# Patient Record
Sex: FEMALE | Race: WHITE | NOT HISPANIC OR LATINO | Employment: OTHER | URBAN - METROPOLITAN AREA
[De-identification: names, ages, dates, MRNs, and addresses within clinical notes are randomized per-mention and may not be internally consistent; named-entity substitution may affect disease eponyms.]

---

## 2020-03-11 ENCOUNTER — HISTORICAL (OUTPATIENT)
Dept: RADIOLOGY | Facility: HOSPITAL | Age: 70
End: 2020-03-11

## 2020-04-29 ENCOUNTER — HISTORICAL (OUTPATIENT)
Dept: ADMINISTRATIVE | Facility: HOSPITAL | Age: 70
End: 2020-04-29

## 2020-05-27 ENCOUNTER — HISTORICAL (OUTPATIENT)
Dept: ADMINISTRATIVE | Facility: HOSPITAL | Age: 70
End: 2020-05-27

## 2020-08-18 ENCOUNTER — HISTORICAL (OUTPATIENT)
Dept: SURGERY | Facility: HOSPITAL | Age: 70
End: 2020-08-18

## 2020-08-18 LAB
BUN SERPL-MCNC: 11.1 MG/DL (ref 9.8–20.1)
CALCIUM SERPL-MCNC: 9 MG/DL (ref 8.4–10.2)
CHLORIDE SERPL-SCNC: 99 MMOL/L (ref 98–107)
CO2 SERPL-SCNC: 29 MMOL/L (ref 23–31)
CREAT SERPL-MCNC: 0.7 MG/DL (ref 0.55–1.02)
GLUCOSE SERPL-MCNC: 123 MG/DL (ref 82–115)
HCT VFR BLD AUTO: 42.6 % (ref 37–47)
HGB BLD-MCNC: 13.2 GM/DL (ref 12–16)
INR PPP: 1 (ref 0–1.3)
PLATELET # BLD AUTO: 379 X10(3)/MCL (ref 130–400)
POTASSIUM SERPL-SCNC: 4.2 MMOL/L (ref 3.5–5.1)
PROTHROMBIN TIME: 12.9 SECOND(S) (ref 11.1–13.7)
SODIUM SERPL-SCNC: 137 MMOL/L (ref 136–145)

## 2020-10-11 ENCOUNTER — HISTORICAL (OUTPATIENT)
Dept: ADMINISTRATIVE | Facility: HOSPITAL | Age: 70
End: 2020-10-11

## 2020-10-11 LAB
ABS NEUT (OLG): 5.35 X10(3)/MCL (ref 2.1–9.2)
ALBUMIN SERPL-MCNC: 2.5 GM/DL (ref 3.4–5)
ALBUMIN/GLOB SERPL: 0.8 RATIO (ref 1.1–2)
ALP SERPL-CCNC: 66 UNIT/L (ref 40–150)
ALT SERPL-CCNC: 15 UNIT/L (ref 0–55)
AST SERPL-CCNC: 18 UNIT/L (ref 5–34)
BASOPHILS # BLD AUTO: 0.1 X10(3)/MCL (ref 0–0.2)
BASOPHILS NFR BLD AUTO: 1 %
BILIRUB SERPL-MCNC: <0.5 MG/DL
BILIRUBIN DIRECT+TOT PNL SERPL-MCNC: 0.1 MG/DL (ref 0–0.5)
BILIRUBIN DIRECT+TOT PNL SERPL-MCNC: <0.4 MG/DL (ref 0–0.8)
BUN SERPL-MCNC: 42.4 MG/DL (ref 9.8–20.1)
CALCIUM SERPL-MCNC: 7.2 MG/DL (ref 8.4–10.2)
CHLORIDE SERPL-SCNC: 99 MMOL/L (ref 98–107)
CO2 SERPL-SCNC: 21 MMOL/L (ref 23–31)
CREAT SERPL-MCNC: 4.97 MG/DL (ref 0.55–1.02)
EOSINOPHIL # BLD AUTO: 0.1 X10(3)/MCL (ref 0–0.9)
EOSINOPHIL NFR BLD AUTO: 2 %
ERYTHROCYTE [DISTWIDTH] IN BLOOD BY AUTOMATED COUNT: 18 % (ref 11.5–17)
GLOBULIN SER-MCNC: 3.2 GM/DL (ref 2.4–3.5)
GLUCOSE SERPL-MCNC: 75 MG/DL (ref 82–115)
HCT VFR BLD AUTO: 29.7 % (ref 37–47)
HGB BLD-MCNC: 8.9 GM/DL (ref 12–16)
LYMPHOCYTES # BLD AUTO: 1.5 X10(3)/MCL (ref 0.6–4.6)
LYMPHOCYTES NFR BLD AUTO: 19 %
MCH RBC QN AUTO: 26.3 PG (ref 27–31)
MCHC RBC AUTO-ENTMCNC: 30 GM/DL (ref 33–36)
MCV RBC AUTO: 87.6 FL (ref 80–94)
MONOCYTES # BLD AUTO: 0.7 X10(3)/MCL (ref 0.1–1.3)
MONOCYTES NFR BLD AUTO: 9 %
NEUTROPHILS # BLD AUTO: 5.35 X10(3)/MCL (ref 2.1–9.2)
NEUTROPHILS NFR BLD AUTO: 68 %
PLATELET # BLD AUTO: 431 X10(3)/MCL (ref 130–400)
PMV BLD AUTO: 9.9 FL (ref 9.4–12.4)
POTASSIUM SERPL-SCNC: 6.6 MMOL/L (ref 3.5–5.1)
PROT SERPL-MCNC: 5.7 GM/DL (ref 5.8–7.6)
RBC # BLD AUTO: 3.39 X10(6)/MCL (ref 4.2–5.4)
SODIUM SERPL-SCNC: 133 MMOL/L (ref 136–145)
WBC # SPEC AUTO: 7.9 X10(3)/MCL (ref 4.5–11.5)

## 2020-11-03 LAB
ABS NEUT (OLG): 9.6 X10(3)/MCL (ref 2.1–9.2)
ALBUMIN SERPL-MCNC: 2 GM/DL (ref 3.4–4.8)
ALBUMIN/GLOB SERPL: 0.5 RATIO (ref 1.1–2)
ALP SERPL-CCNC: 76 UNIT/L (ref 40–150)
ALT SERPL-CCNC: 10 UNIT/L (ref 0–55)
AST SERPL-CCNC: 14 UNIT/L (ref 5–34)
BASOPHILS # BLD AUTO: 0.05 X10(3)/MCL (ref 0–0.2)
BASOPHILS NFR BLD AUTO: 0.4 % (ref 0–1)
BILIRUB SERPL-MCNC: 0.2 MG/DL (ref 0.2–1.2)
BILIRUBIN DIRECT+TOT PNL SERPL-MCNC: 0.1 MG/DL (ref 0–0.5)
BILIRUBIN DIRECT+TOT PNL SERPL-MCNC: 0.1 MG/DL (ref 0–0.8)
BUN SERPL-MCNC: 17.4 MG/DL (ref 9.8–20.1)
CALCIUM SERPL-MCNC: 9.6 MG/DL (ref 8.4–10.2)
CHLORIDE SERPL-SCNC: 92 MMOL/L (ref 98–107)
CO2 SERPL-SCNC: 37 MMOL/L (ref 23–31)
CREAT SERPL-MCNC: 1.34 MG/DL (ref 0.57–1.11)
CRP SERPL HS-MCNC: 49.41 MG/L (ref 0–5)
EOSINOPHIL # BLD AUTO: 0.27 X10(3)/MCL (ref 0–0.9)
EOSINOPHIL NFR BLD AUTO: 2 % (ref 0–6.4)
ERYTHROCYTE [DISTWIDTH] IN BLOOD BY AUTOMATED COUNT: 16.2 % (ref 11.5–17)
ERYTHROCYTE [SEDIMENTATION RATE] IN BLOOD: 98 MM/HR (ref 0–30)
FERRITIN SERPL-MCNC: 107.5 NG/ML (ref 4.63–204)
GLOBULIN SER-MCNC: 4.1 GM/DL (ref 2.4–3.5)
GLUCOSE SERPL-MCNC: 105 MG/DL (ref 82–115)
HCT VFR BLD AUTO: 30.8 % (ref 37–47)
HGB BLD-MCNC: 9.5 GM/DL (ref 12–16)
IMM GRANULOCYTES # BLD AUTO: 0.2 10*3/UL (ref 0–0.02)
IMM GRANULOCYTES NFR BLD AUTO: 1.5 % (ref 0–0.43)
IRON SATN MFR SERPL: 11 % (ref 20–50)
IRON SERPL-MCNC: 23 UG/DL (ref 50–170)
LYMPHOCYTES # BLD AUTO: 2.22 X10(3)/MCL (ref 0.6–4.6)
LYMPHOCYTES NFR BLD AUTO: 16.7 % (ref 16–44)
MAGNESIUM SERPL-MCNC: 1.73 MG/DL (ref 1.6–2.6)
MCH RBC QN AUTO: 27.3 PG (ref 27–31)
MCHC RBC AUTO-ENTMCNC: 30.8 GM/DL (ref 33–36)
MCV RBC AUTO: 88.5 FL (ref 80–94)
MONOCYTES # BLD AUTO: 0.95 X10(3)/MCL (ref 0.1–1.3)
MONOCYTES NFR BLD AUTO: 7.1 % (ref 4–12.1)
NEUTROPHILS # BLD AUTO: 9.6 X10(3)/MCL (ref 2.1–9.2)
NEUTROPHILS NFR BLD AUTO: 72.3 % (ref 43–73)
NRBC BLD AUTO-RTO: 0 % (ref 0–0.2)
PHOSPHATE SERPL-MCNC: 5.9 MG/DL (ref 2.3–4.7)
PLATELET # BLD AUTO: 530 X10(3)/MCL (ref 130–400)
PMV BLD AUTO: 8.9 FL (ref 7.4–10.4)
POTASSIUM SERPL-SCNC: 3.8 MMOL/L (ref 3.5–5.1)
PROT SERPL-MCNC: 6.1 GM/DL (ref 5.8–7.6)
RBC # BLD AUTO: 3.48 X10(6)/MCL (ref 4.2–5.4)
SODIUM SERPL-SCNC: 139 MMOL/L (ref 136–145)
TIBC SERPL-MCNC: 192 UG/DL (ref 70–310)
TIBC SERPL-MCNC: 215 UG/DL (ref 250–450)
TRANSFERRIN SERPL-MCNC: 185 MG/DL (ref 173–360)
WBC # SPEC AUTO: 13.3 X10(3)/MCL (ref 4.5–11.5)

## 2020-11-04 LAB
BUN SERPL-MCNC: 16.3 MG/DL (ref 9.8–20.1)
CALCIUM SERPL-MCNC: 9.4 MG/DL (ref 8.4–10.2)
CHLORIDE SERPL-SCNC: 93 MMOL/L (ref 98–107)
CHOLEST SERPL-MCNC: 140 MG/DL
CHOLEST/HDLC SERPL: 4 {RATIO} (ref 0–5)
CO2 SERPL-SCNC: 38 MMOL/L (ref 23–31)
CREAT SERPL-MCNC: 1.02 MG/DL (ref 0.57–1.11)
CREAT/UREA NIT SERPL: 16
GLUCOSE SERPL-MCNC: 102 MG/DL (ref 82–115)
HDLC SERPL-MCNC: 38 MG/DL (ref 40–60)
LDLC SERPL CALC-MCNC: 83 MG/DL (ref 50–140)
MAGNESIUM SERPL-MCNC: 1.36 MG/DL (ref 1.6–2.6)
PHOSPHATE SERPL-MCNC: 5.4 MG/DL (ref 2.3–4.7)
POTASSIUM SERPL-SCNC: 3.2 MMOL/L (ref 3.5–5.1)
PREALB SERPL-MCNC: 12.3 MG/DL (ref 14–37)
SODIUM SERPL-SCNC: 141 MMOL/L (ref 136–145)
TRIGL SERPL-MCNC: 97 MG/DL (ref 0–150)
VLDLC SERPL CALC-MCNC: 19 MG/DL

## 2020-11-05 LAB
BUN SERPL-MCNC: 14.8 MG/DL (ref 9.8–20.1)
CALCIUM SERPL-MCNC: 9.6 MG/DL (ref 8.4–10.2)
CHLORIDE SERPL-SCNC: 93 MMOL/L (ref 98–107)
CO2 SERPL-SCNC: 33 MMOL/L (ref 23–31)
CREAT SERPL-MCNC: 0.89 MG/DL (ref 0.57–1.11)
CREAT/UREA NIT SERPL: 17
GLUCOSE SERPL-MCNC: 112 MG/DL (ref 82–115)
MAGNESIUM SERPL-MCNC: 1.7 MG/DL (ref 1.6–2.6)
POTASSIUM SERPL-SCNC: 3.8 MMOL/L (ref 3.5–5.1)
SODIUM SERPL-SCNC: 137 MMOL/L (ref 136–145)

## 2020-11-07 ENCOUNTER — HISTORICAL (OUTPATIENT)
Dept: ADMINISTRATIVE | Facility: HOSPITAL | Age: 70
End: 2020-11-07

## 2020-11-07 LAB
ABS NEUT (OLG): 13.01 X10(3)/MCL (ref 2.1–9.2)
ALBUMIN SERPL-MCNC: 2.2 GM/DL (ref 3.4–4.8)
ALBUMIN/GLOB SERPL: 0.5 RATIO (ref 1.1–2)
ALP SERPL-CCNC: 65 UNIT/L (ref 40–150)
ALT SERPL-CCNC: 11 UNIT/L (ref 0–55)
APPEARANCE, UA: CLEAR
AST SERPL-CCNC: 15 UNIT/L (ref 5–34)
BACTERIA SPEC CULT: ABNORMAL /HPF
BASOPHILS # BLD AUTO: 0.04 X10(3)/MCL (ref 0–0.2)
BASOPHILS NFR BLD AUTO: 0.2 % (ref 0–1)
BILIRUB SERPL-MCNC: 0.2 MG/DL (ref 0.2–1.2)
BILIRUB UR QL STRIP: NEGATIVE
BILIRUBIN DIRECT+TOT PNL SERPL-MCNC: 0.1 MG/DL (ref 0–0.5)
BILIRUBIN DIRECT+TOT PNL SERPL-MCNC: 0.1 MG/DL (ref 0–0.8)
BUN SERPL-MCNC: 17.4 MG/DL (ref 9.8–20.1)
CALCIUM SERPL-MCNC: 9.3 MG/DL (ref 8.4–10.2)
CHLORIDE SERPL-SCNC: 92 MMOL/L (ref 98–107)
CO2 SERPL-SCNC: 31 MMOL/L (ref 23–31)
COLOR UR: ABNORMAL
CREAT SERPL-MCNC: 0.88 MG/DL (ref 0.57–1.11)
EOSINOPHIL # BLD AUTO: 0.23 X10(3)/MCL (ref 0–0.9)
EOSINOPHIL NFR BLD AUTO: 1.4 % (ref 0–6.4)
ERYTHROCYTE [DISTWIDTH] IN BLOOD BY AUTOMATED COUNT: 15.9 % (ref 11.5–17)
GLOBULIN SER-MCNC: 4.2 GM/DL (ref 2.4–3.5)
GLUCOSE (UA): NEGATIVE
GLUCOSE SERPL-MCNC: 158 MG/DL (ref 82–115)
HCT VFR BLD AUTO: 30.4 % (ref 37–47)
HGB BLD-MCNC: 9.6 GM/DL (ref 12–16)
HGB UR QL STRIP: ABNORMAL
IMM GRANULOCYTES # BLD AUTO: 0.14 10*3/UL (ref 0–0.02)
IMM GRANULOCYTES NFR BLD AUTO: 0.8 % (ref 0–0.43)
KETONES UR QL STRIP: NEGATIVE
LEUKOCYTE ESTERASE UR QL STRIP: ABNORMAL
LYMPHOCYTES # BLD AUTO: 2.39 X10(3)/MCL (ref 0.6–4.6)
LYMPHOCYTES NFR BLD AUTO: 14.2 % (ref 16–44)
MCH RBC QN AUTO: 27.7 PG (ref 27–31)
MCHC RBC AUTO-ENTMCNC: 31.6 GM/DL (ref 33–36)
MCV RBC AUTO: 87.6 FL (ref 80–94)
MONOCYTES # BLD AUTO: 0.99 X10(3)/MCL (ref 0.1–1.3)
MONOCYTES NFR BLD AUTO: 5.9 % (ref 4–12.1)
NEUTROPHILS # BLD AUTO: 13.01 X10(3)/MCL (ref 2.1–9.2)
NEUTROPHILS NFR BLD AUTO: 77.5 % (ref 43–73)
NITRITE UR QL STRIP: NEGATIVE
NRBC BLD AUTO-RTO: 0 % (ref 0–0.2)
PH UR STRIP: 8 [PH] (ref 5–7)
PLATELET # BLD AUTO: 459 X10(3)/MCL (ref 130–400)
PMV BLD AUTO: 9.3 FL (ref 7.4–10.4)
POTASSIUM SERPL-SCNC: 4 MMOL/L (ref 3.5–5.1)
PROT SERPL-MCNC: 6.4 GM/DL (ref 5.8–7.6)
PROT UR QL STRIP: ABNORMAL
RBC # BLD AUTO: 3.47 X10(6)/MCL (ref 4.2–5.4)
RBC #/AREA URNS HPF: ABNORMAL /HPF
SODIUM SERPL-SCNC: 134 MMOL/L (ref 136–145)
SP GR UR STRIP: 1.02 (ref 1–1.03)
SQUAMOUS EPITHELIAL, UA: ABNORMAL /LPF
UROBILINOGEN UR STRIP-ACNC: NEGATIVE
WBC # SPEC AUTO: 16.8 X10(3)/MCL (ref 4.5–11.5)
WBC #/AREA URNS HPF: ABNORMAL /HPF

## 2020-11-08 LAB
ABS NEUT (OLG): 9.61 X10(3)/MCL (ref 2.1–9.2)
BASOPHILS # BLD AUTO: 0.03 X10(3)/MCL (ref 0–0.2)
BASOPHILS NFR BLD AUTO: 0.2 % (ref 0–1)
BUN SERPL-MCNC: 15.4 MG/DL (ref 9.8–20.1)
CALCIUM SERPL-MCNC: 10 MG/DL (ref 8.4–10.2)
CHLORIDE SERPL-SCNC: 95 MMOL/L (ref 98–107)
CO2 SERPL-SCNC: 33 MMOL/L (ref 23–31)
CREAT SERPL-MCNC: 0.87 MG/DL (ref 0.57–1.11)
CREAT/UREA NIT SERPL: 18
EOSINOPHIL # BLD AUTO: 0.22 X10(3)/MCL (ref 0–0.9)
EOSINOPHIL NFR BLD AUTO: 1.7 % (ref 0–6.4)
ERYTHROCYTE [DISTWIDTH] IN BLOOD BY AUTOMATED COUNT: 15.7 % (ref 11.5–17)
GLUCOSE SERPL-MCNC: 143 MG/DL (ref 82–115)
HCT VFR BLD AUTO: 29.8 % (ref 37–47)
HGB BLD-MCNC: 9.4 GM/DL (ref 12–16)
IMM GRANULOCYTES # BLD AUTO: 0.12 10*3/UL (ref 0–0.02)
IMM GRANULOCYTES NFR BLD AUTO: 0.9 % (ref 0–0.43)
LYMPHOCYTES # BLD AUTO: 2.08 X10(3)/MCL (ref 0.6–4.6)
LYMPHOCYTES NFR BLD AUTO: 16 % (ref 16–44)
MAGNESIUM SERPL-MCNC: 1.33 MG/DL (ref 1.6–2.6)
MCH RBC QN AUTO: 27.6 PG (ref 27–31)
MCHC RBC AUTO-ENTMCNC: 31.5 GM/DL (ref 33–36)
MCV RBC AUTO: 87.4 FL (ref 80–94)
MONOCYTES # BLD AUTO: 0.96 X10(3)/MCL (ref 0.1–1.3)
MONOCYTES NFR BLD AUTO: 7.4 % (ref 4–12.1)
NEUTROPHILS # BLD AUTO: 9.61 X10(3)/MCL (ref 2.1–9.2)
NEUTROPHILS NFR BLD AUTO: 73.8 % (ref 43–73)
NRBC BLD AUTO-RTO: 0 % (ref 0–0.2)
PHOSPHATE SERPL-MCNC: 4.4 MG/DL (ref 2.3–4.7)
PLATELET # BLD AUTO: 414 X10(3)/MCL (ref 130–400)
PMV BLD AUTO: 9.3 FL (ref 7.4–10.4)
POTASSIUM SERPL-SCNC: 4 MMOL/L (ref 3.5–5.1)
RBC # BLD AUTO: 3.41 X10(6)/MCL (ref 4.2–5.4)
SODIUM SERPL-SCNC: 138 MMOL/L (ref 136–145)
WBC # SPEC AUTO: 13 X10(3)/MCL (ref 4.5–11.5)

## 2020-11-09 LAB
ABS NEUT (OLG): 11.7 X10(3)/MCL (ref 2.1–9.2)
ALBUMIN SERPL-MCNC: 2.4 GM/DL (ref 3.4–4.8)
ALBUMIN/GLOB SERPL: 0.5 RATIO (ref 1.1–2)
ALP SERPL-CCNC: 71 UNIT/L (ref 40–150)
ALT SERPL-CCNC: 10 UNIT/L (ref 0–55)
AST SERPL-CCNC: 15 UNIT/L (ref 5–34)
BASOPHILS # BLD AUTO: 0.05 X10(3)/MCL (ref 0–0.2)
BASOPHILS NFR BLD AUTO: 0.3 % (ref 0–1)
BILIRUB SERPL-MCNC: 0.3 MG/DL (ref 0.2–1.2)
BILIRUBIN DIRECT+TOT PNL SERPL-MCNC: 0.1 MG/DL (ref 0–0.5)
BILIRUBIN DIRECT+TOT PNL SERPL-MCNC: 0.2 MG/DL (ref 0–0.8)
BUN SERPL-MCNC: 14.7 MG/DL (ref 9.8–20.1)
CALCIUM SERPL-MCNC: 10.1 MG/DL (ref 8.4–10.2)
CHLORIDE SERPL-SCNC: 97 MMOL/L (ref 98–107)
CO2 SERPL-SCNC: 29 MMOL/L (ref 23–31)
CREAT SERPL-MCNC: 0.81 MG/DL (ref 0.57–1.11)
CRP SERPL HS-MCNC: 86.22 MG/L (ref 0–5)
EOSINOPHIL # BLD AUTO: 0.29 X10(3)/MCL (ref 0–0.9)
EOSINOPHIL NFR BLD AUTO: 1.9 % (ref 0–6.4)
ERYTHROCYTE [DISTWIDTH] IN BLOOD BY AUTOMATED COUNT: 15.7 % (ref 11.5–17)
ERYTHROCYTE [SEDIMENTATION RATE] IN BLOOD: 96 MM/HR (ref 0–30)
GLOBULIN SER-MCNC: 4.5 GM/DL (ref 2.4–3.5)
GLUCOSE SERPL-MCNC: 128 MG/DL (ref 82–115)
HCT VFR BLD AUTO: 33.5 % (ref 37–47)
HGB BLD-MCNC: 10.4 GM/DL (ref 12–16)
IMM GRANULOCYTES # BLD AUTO: 0.11 10*3/UL (ref 0–0.02)
IMM GRANULOCYTES NFR BLD AUTO: 0.7 % (ref 0–0.43)
LYMPHOCYTES # BLD AUTO: 2.19 X10(3)/MCL (ref 0.6–4.6)
LYMPHOCYTES NFR BLD AUTO: 14.3 % (ref 16–44)
MAGNESIUM SERPL-MCNC: 2.15 MG/DL (ref 1.6–2.6)
MCH RBC QN AUTO: 27.2 PG (ref 27–31)
MCHC RBC AUTO-ENTMCNC: 31 GM/DL (ref 33–36)
MCV RBC AUTO: 87.5 FL (ref 80–94)
MONOCYTES # BLD AUTO: 0.95 X10(3)/MCL (ref 0.1–1.3)
MONOCYTES NFR BLD AUTO: 6.2 % (ref 4–12.1)
NEUTROPHILS # BLD AUTO: 11.7 X10(3)/MCL (ref 2.1–9.2)
NEUTROPHILS NFR BLD AUTO: 76.6 % (ref 43–73)
NRBC BLD AUTO-RTO: 0 % (ref 0–0.2)
PHOSPHATE SERPL-MCNC: 4.4 MG/DL (ref 2.3–4.7)
PLATELET # BLD AUTO: 422 X10(3)/MCL (ref 130–400)
PMV BLD AUTO: 9.3 FL (ref 7.4–10.4)
POTASSIUM SERPL-SCNC: 3.9 MMOL/L (ref 3.5–5.1)
PREALB SERPL-MCNC: 15.4 MG/DL (ref 14–37)
PROT SERPL-MCNC: 6.9 GM/DL (ref 5.8–7.6)
RBC # BLD AUTO: 3.83 X10(6)/MCL (ref 4.2–5.4)
SODIUM SERPL-SCNC: 137 MMOL/L (ref 136–145)
WBC # SPEC AUTO: 15.3 X10(3)/MCL (ref 4.5–11.5)

## 2020-11-10 ENCOUNTER — HISTORICAL (OUTPATIENT)
Dept: SURGERY | Facility: HOSPITAL | Age: 70
End: 2020-11-10

## 2020-11-11 LAB
ABS NEUT (OLG): 10.37 X10(3)/MCL (ref 2.1–9.2)
BASOPHILS # BLD AUTO: 0.05 X10(3)/MCL (ref 0–0.2)
BASOPHILS NFR BLD AUTO: 0.4 % (ref 0–1)
BUN SERPL-MCNC: 20.3 MG/DL (ref 9.8–20.1)
CALCIUM SERPL-MCNC: 9.9 MG/DL (ref 8.4–10.2)
CHLORIDE SERPL-SCNC: 94 MMOL/L (ref 98–107)
CO2 SERPL-SCNC: 33 MMOL/L (ref 23–31)
CREAT SERPL-MCNC: 1.02 MG/DL (ref 0.57–1.11)
CREAT/UREA NIT SERPL: 20
EOSINOPHIL # BLD AUTO: 0.26 X10(3)/MCL (ref 0–0.9)
EOSINOPHIL NFR BLD AUTO: 1.9 % (ref 0–6.4)
ERYTHROCYTE [DISTWIDTH] IN BLOOD BY AUTOMATED COUNT: 15.5 % (ref 11.5–17)
GLUCOSE SERPL-MCNC: 121 MG/DL (ref 82–115)
HCT VFR BLD AUTO: 32.3 % (ref 37–47)
HGB BLD-MCNC: 10 GM/DL (ref 12–16)
IMM GRANULOCYTES # BLD AUTO: 0.08 10*3/UL (ref 0–0.02)
IMM GRANULOCYTES NFR BLD AUTO: 0.6 % (ref 0–0.43)
LYMPHOCYTES # BLD AUTO: 1.97 X10(3)/MCL (ref 0.6–4.6)
LYMPHOCYTES NFR BLD AUTO: 14.3 % (ref 16–44)
MCH RBC QN AUTO: 27.5 PG (ref 27–31)
MCHC RBC AUTO-ENTMCNC: 31 GM/DL (ref 33–36)
MCV RBC AUTO: 89 FL (ref 80–94)
MONOCYTES # BLD AUTO: 1.05 X10(3)/MCL (ref 0.1–1.3)
MONOCYTES NFR BLD AUTO: 7.6 % (ref 4–12.1)
NEUTROPHILS # BLD AUTO: 10.37 X10(3)/MCL (ref 2.1–9.2)
NEUTROPHILS NFR BLD AUTO: 75.2 % (ref 43–73)
NRBC BLD AUTO-RTO: 0 % (ref 0–0.2)
PLATELET # BLD AUTO: 411 X10(3)/MCL (ref 130–400)
PMV BLD AUTO: 9.6 FL (ref 7.4–10.4)
POTASSIUM SERPL-SCNC: 4.2 MMOL/L (ref 3.5–5.1)
RBC # BLD AUTO: 3.63 X10(6)/MCL (ref 4.2–5.4)
SODIUM SERPL-SCNC: 137 MMOL/L (ref 136–145)
WBC # SPEC AUTO: 13.8 X10(3)/MCL (ref 4.5–11.5)

## 2020-11-16 LAB
ABS NEUT (OLG): 9.26 X10(3)/MCL (ref 2.1–9.2)
ALBUMIN SERPL-MCNC: 2.8 GM/DL (ref 3.4–4.8)
ALBUMIN/GLOB SERPL: 0.6 RATIO (ref 1.1–2)
ALP SERPL-CCNC: 82 UNIT/L (ref 40–150)
ALT SERPL-CCNC: 16 UNIT/L (ref 0–55)
AST SERPL-CCNC: 16 UNIT/L (ref 5–34)
BASOPHILS # BLD AUTO: 0.06 X10(3)/MCL (ref 0–0.2)
BASOPHILS NFR BLD AUTO: 0.4 % (ref 0–1)
BILIRUB SERPL-MCNC: 0.2 MG/DL (ref 0.2–1.2)
BILIRUBIN DIRECT+TOT PNL SERPL-MCNC: 0.1 MG/DL (ref 0–0.5)
BILIRUBIN DIRECT+TOT PNL SERPL-MCNC: 0.1 MG/DL (ref 0–0.8)
BUN SERPL-MCNC: 24 MG/DL (ref 9.8–20.1)
CALCIUM SERPL-MCNC: 10.9 MG/DL (ref 8.4–10.2)
CHLORIDE SERPL-SCNC: 94 MMOL/L (ref 98–107)
CO2 SERPL-SCNC: 31 MMOL/L (ref 23–31)
CREAT SERPL-MCNC: 0.86 MG/DL (ref 0.57–1.11)
CRP SERPL HS-MCNC: 31.14 MG/L (ref 0–5)
EOSINOPHIL # BLD AUTO: 0.32 X10(3)/MCL (ref 0–0.9)
EOSINOPHIL NFR BLD AUTO: 2.4 % (ref 0–6.4)
ERYTHROCYTE [DISTWIDTH] IN BLOOD BY AUTOMATED COUNT: 15.1 % (ref 11.5–17)
ERYTHROCYTE [SEDIMENTATION RATE] IN BLOOD: 97 MM/HR (ref 0–30)
GLOBULIN SER-MCNC: 4.9 GM/DL (ref 2.4–3.5)
GLUCOSE SERPL-MCNC: 161 MG/DL (ref 82–115)
HCT VFR BLD AUTO: 35.4 % (ref 37–47)
HGB BLD-MCNC: 10.9 GM/DL (ref 12–16)
IMM GRANULOCYTES # BLD AUTO: 0.22 10*3/UL (ref 0–0.02)
IMM GRANULOCYTES NFR BLD AUTO: 1.6 % (ref 0–0.43)
LYMPHOCYTES # BLD AUTO: 2.76 X10(3)/MCL (ref 0.6–4.6)
LYMPHOCYTES NFR BLD AUTO: 20.6 % (ref 16–44)
MAGNESIUM SERPL-MCNC: 2.12 MG/DL (ref 1.6–2.6)
MCH RBC QN AUTO: 26.4 PG (ref 27–31)
MCHC RBC AUTO-ENTMCNC: 30.8 GM/DL (ref 33–36)
MCV RBC AUTO: 85.7 FL (ref 80–94)
MONOCYTES # BLD AUTO: 0.79 X10(3)/MCL (ref 0.1–1.3)
MONOCYTES NFR BLD AUTO: 5.9 % (ref 4–12.1)
NEUTROPHILS # BLD AUTO: 9.26 X10(3)/MCL (ref 2.1–9.2)
NEUTROPHILS NFR BLD AUTO: 69.1 % (ref 43–73)
NRBC BLD AUTO-RTO: 0 % (ref 0–0.2)
PHOSPHATE SERPL-MCNC: 4.2 MG/DL (ref 2.3–4.7)
PLATELET # BLD AUTO: 475 X10(3)/MCL (ref 130–400)
PMV BLD AUTO: 9.6 FL (ref 7.4–10.4)
POTASSIUM SERPL-SCNC: 4.3 MMOL/L (ref 3.5–5.1)
PREALB SERPL-MCNC: 21.2 MG/DL (ref 14–37)
PROT SERPL-MCNC: 7.7 GM/DL (ref 5.8–7.6)
RBC # BLD AUTO: 4.13 X10(6)/MCL (ref 4.2–5.4)
SODIUM SERPL-SCNC: 134 MMOL/L (ref 136–145)
WBC # SPEC AUTO: 13.4 X10(3)/MCL (ref 4.5–11.5)

## 2020-11-18 LAB
ALBUMIN SERPL-MCNC: 2.9 GM/DL (ref 3.4–4.8)
ALBUMIN/GLOB SERPL: 0.6 RATIO (ref 1.1–2)
ALP SERPL-CCNC: 72 UNIT/L (ref 40–150)
ALT SERPL-CCNC: 16 UNIT/L (ref 0–55)
AST SERPL-CCNC: 13 UNIT/L (ref 5–34)
BILIRUB SERPL-MCNC: 0.2 MG/DL (ref 0.2–1.2)
BILIRUBIN DIRECT+TOT PNL SERPL-MCNC: 0.1 MG/DL (ref 0–0.5)
BILIRUBIN DIRECT+TOT PNL SERPL-MCNC: 0.1 MG/DL (ref 0–0.8)
BUN SERPL-MCNC: 26.6 MG/DL (ref 9.8–20.1)
CALCIUM SERPL-MCNC: 10.4 MG/DL (ref 8.4–10.2)
CHLORIDE SERPL-SCNC: 99 MMOL/L (ref 98–107)
CO2 SERPL-SCNC: 25 MMOL/L (ref 23–31)
CREAT SERPL-MCNC: 0.82 MG/DL (ref 0.57–1.11)
GLOBULIN SER-MCNC: 4.5 GM/DL (ref 2.4–3.5)
GLUCOSE SERPL-MCNC: 220 MG/DL (ref 82–115)
POTASSIUM SERPL-SCNC: 4.5 MMOL/L (ref 3.5–5.1)
PROT SERPL-MCNC: 7.4 GM/DL (ref 5.8–7.6)
SODIUM SERPL-SCNC: 134 MMOL/L (ref 136–145)

## 2020-11-20 LAB
ABS NEUT (OLG): 10.42 X10(3)/MCL (ref 2.1–9.2)
ALBUMIN SERPL-MCNC: 2.9 GM/DL (ref 3.4–4.8)
ALBUMIN/GLOB SERPL: 0.6 RATIO (ref 1.1–2)
ALP SERPL-CCNC: 73 UNIT/L (ref 40–150)
ALT SERPL-CCNC: 24 UNIT/L (ref 0–55)
AST SERPL-CCNC: 15 UNIT/L (ref 5–34)
BASOPHILS # BLD AUTO: 0.06 X10(3)/MCL (ref 0–0.2)
BASOPHILS NFR BLD AUTO: 0.4 % (ref 0–1)
BILIRUB SERPL-MCNC: 0.2 MG/DL (ref 0.2–1.2)
BILIRUBIN DIRECT+TOT PNL SERPL-MCNC: 0.1 MG/DL (ref 0–0.5)
BILIRUBIN DIRECT+TOT PNL SERPL-MCNC: 0.1 MG/DL (ref 0–0.8)
BUN SERPL-MCNC: 30.2 MG/DL (ref 9.8–20.1)
CALCIUM SERPL-MCNC: 10.4 MG/DL (ref 8.4–10.2)
CHLORIDE SERPL-SCNC: 98 MMOL/L (ref 98–107)
CO2 SERPL-SCNC: 26 MMOL/L (ref 23–31)
CREAT SERPL-MCNC: 0.73 MG/DL (ref 0.57–1.11)
EOSINOPHIL # BLD AUTO: 0.45 X10(3)/MCL (ref 0–0.9)
EOSINOPHIL NFR BLD AUTO: 2.9 % (ref 0–6.4)
ERYTHROCYTE [DISTWIDTH] IN BLOOD BY AUTOMATED COUNT: 15.2 % (ref 11.5–17)
GLOBULIN SER-MCNC: 4.5 GM/DL (ref 2.4–3.5)
GLUCOSE SERPL-MCNC: 165 MG/DL (ref 82–115)
HCT VFR BLD AUTO: 33.1 % (ref 37–47)
HGB BLD-MCNC: 10.4 GM/DL (ref 12–16)
IMM GRANULOCYTES # BLD AUTO: 0.23 10*3/UL (ref 0–0.02)
IMM GRANULOCYTES NFR BLD AUTO: 1.5 % (ref 0–0.43)
LYMPHOCYTES # BLD AUTO: 3.25 X10(3)/MCL (ref 0.6–4.6)
LYMPHOCYTES NFR BLD AUTO: 21.2 % (ref 16–44)
MCH RBC QN AUTO: 26.9 PG (ref 27–31)
MCHC RBC AUTO-ENTMCNC: 31.4 GM/DL (ref 33–36)
MCV RBC AUTO: 85.5 FL (ref 80–94)
MONOCYTES # BLD AUTO: 0.93 X10(3)/MCL (ref 0.1–1.3)
MONOCYTES NFR BLD AUTO: 6.1 % (ref 4–12.1)
NEUTROPHILS # BLD AUTO: 10.42 X10(3)/MCL (ref 2.1–9.2)
NEUTROPHILS NFR BLD AUTO: 67.9 % (ref 43–73)
NRBC BLD AUTO-RTO: 0 % (ref 0–0.2)
PLATELET # BLD AUTO: 428 X10(3)/MCL (ref 130–400)
PMV BLD AUTO: 9.4 FL (ref 7.4–10.4)
POTASSIUM SERPL-SCNC: 4.4 MMOL/L (ref 3.5–5.1)
PROT SERPL-MCNC: 7.4 GM/DL (ref 5.8–7.6)
RBC # BLD AUTO: 3.87 X10(6)/MCL (ref 4.2–5.4)
SODIUM SERPL-SCNC: 134 MMOL/L (ref 136–145)
WBC # SPEC AUTO: 15.3 X10(3)/MCL (ref 4.5–11.5)

## 2020-11-23 LAB
ABS NEUT (OLG): 9.81 X10(3)/MCL (ref 2.1–9.2)
ALBUMIN SERPL-MCNC: 3.1 GM/DL (ref 3.4–4.8)
ALBUMIN/GLOB SERPL: 0.7 RATIO (ref 1.1–2)
ALP SERPL-CCNC: 77 UNIT/L (ref 40–150)
ALT SERPL-CCNC: 24 UNIT/L (ref 0–55)
AST SERPL-CCNC: 14 UNIT/L (ref 5–34)
BASOPHILS # BLD AUTO: 0.05 X10(3)/MCL (ref 0–0.2)
BASOPHILS NFR BLD AUTO: 0.3 % (ref 0–1)
BILIRUB SERPL-MCNC: 0.3 MG/DL (ref 0.2–1.2)
BILIRUBIN DIRECT+TOT PNL SERPL-MCNC: 0.1 MG/DL (ref 0–0.5)
BILIRUBIN DIRECT+TOT PNL SERPL-MCNC: 0.2 MG/DL (ref 0–0.8)
BUN SERPL-MCNC: 24.5 MG/DL (ref 9.8–20.1)
CALCIUM SERPL-MCNC: 11.1 MG/DL (ref 8.4–10.2)
CHLORIDE SERPL-SCNC: 96 MMOL/L (ref 98–107)
CO2 SERPL-SCNC: 28 MMOL/L (ref 23–31)
CREAT SERPL-MCNC: 0.82 MG/DL (ref 0.57–1.11)
CRP SERPL HS-MCNC: 23.79 MG/L (ref 0–5)
EOSINOPHIL # BLD AUTO: 0.5 X10(3)/MCL (ref 0–0.9)
EOSINOPHIL NFR BLD AUTO: 3.5 % (ref 0–6.4)
ERYTHROCYTE [DISTWIDTH] IN BLOOD BY AUTOMATED COUNT: 15.3 % (ref 11.5–17)
ERYTHROCYTE [SEDIMENTATION RATE] IN BLOOD: 92 MM/HR (ref 0–30)
GLOBULIN SER-MCNC: 4.6 GM/DL (ref 2.4–3.5)
GLUCOSE SERPL-MCNC: 139 MG/DL (ref 82–115)
HCT VFR BLD AUTO: 34.5 % (ref 37–47)
HGB BLD-MCNC: 10.8 GM/DL (ref 12–16)
IMM GRANULOCYTES # BLD AUTO: 0.13 10*3/UL (ref 0–0.02)
IMM GRANULOCYTES NFR BLD AUTO: 0.9 % (ref 0–0.43)
LYMPHOCYTES # BLD AUTO: 3.08 X10(3)/MCL (ref 0.6–4.6)
LYMPHOCYTES NFR BLD AUTO: 21.3 % (ref 16–44)
MAGNESIUM SERPL-MCNC: 1.6 MG/DL (ref 1.6–2.6)
MCH RBC QN AUTO: 26.9 PG (ref 27–31)
MCHC RBC AUTO-ENTMCNC: 31.3 GM/DL (ref 33–36)
MCV RBC AUTO: 85.8 FL (ref 80–94)
MONOCYTES # BLD AUTO: 0.87 X10(3)/MCL (ref 0.1–1.3)
MONOCYTES NFR BLD AUTO: 6 % (ref 4–12.1)
NEUTROPHILS # BLD AUTO: 9.81 X10(3)/MCL (ref 2.1–9.2)
NEUTROPHILS NFR BLD AUTO: 68 % (ref 43–73)
NRBC BLD AUTO-RTO: 0 % (ref 0–0.2)
PHOSPHATE SERPL-MCNC: 4.9 MG/DL (ref 2.3–4.7)
PLATELET # BLD AUTO: 448 X10(3)/MCL (ref 130–400)
PMV BLD AUTO: 9.6 FL (ref 7.4–10.4)
POTASSIUM SERPL-SCNC: 4.7 MMOL/L (ref 3.5–5.1)
PREALB SERPL-MCNC: 29.3 MG/DL (ref 14–37)
PROT SERPL-MCNC: 7.7 GM/DL (ref 5.8–7.6)
RBC # BLD AUTO: 4.02 X10(6)/MCL (ref 4.2–5.4)
SODIUM SERPL-SCNC: 135 MMOL/L (ref 136–145)
WBC # SPEC AUTO: 14.4 X10(3)/MCL (ref 4.5–11.5)

## 2020-11-25 LAB
ALBUMIN SERPL-MCNC: 2.8 GM/DL (ref 3.4–4.8)
ALBUMIN/GLOB SERPL: 0.7 RATIO (ref 1.1–2)
ALP SERPL-CCNC: 67 UNIT/L (ref 40–150)
ALT SERPL-CCNC: 19 UNIT/L (ref 0–55)
AST SERPL-CCNC: 10 UNIT/L (ref 5–34)
BILIRUB SERPL-MCNC: 0.2 MG/DL (ref 0.2–1.2)
BILIRUBIN DIRECT+TOT PNL SERPL-MCNC: 0.1 MG/DL (ref 0–0.5)
BILIRUBIN DIRECT+TOT PNL SERPL-MCNC: 0.1 MG/DL (ref 0–0.8)
BUN SERPL-MCNC: 26.4 MG/DL (ref 9.8–20.1)
CALCIUM SERPL-MCNC: 10.6 MG/DL (ref 8.4–10.2)
CHLORIDE SERPL-SCNC: 101 MMOL/L (ref 98–107)
CO2 SERPL-SCNC: 25 MMOL/L (ref 23–31)
CREAT SERPL-MCNC: 0.79 MG/DL (ref 0.57–1.11)
GLOBULIN SER-MCNC: 4.2 GM/DL (ref 2.4–3.5)
GLUCOSE SERPL-MCNC: 130 MG/DL (ref 82–115)
POTASSIUM SERPL-SCNC: 4 MMOL/L (ref 3.5–5.1)
PROT SERPL-MCNC: 7 GM/DL (ref 5.8–7.6)
SODIUM SERPL-SCNC: 136 MMOL/L (ref 136–145)

## 2021-03-18 ENCOUNTER — HISTORICAL (OUTPATIENT)
Dept: RADIOLOGY | Facility: HOSPITAL | Age: 71
End: 2021-03-18

## 2021-03-31 ENCOUNTER — HISTORICAL (OUTPATIENT)
Dept: RADIOLOGY | Facility: HOSPITAL | Age: 71
End: 2021-03-31

## 2021-04-15 ENCOUNTER — HISTORICAL (OUTPATIENT)
Dept: RADIOLOGY | Facility: HOSPITAL | Age: 71
End: 2021-04-15

## 2021-09-13 ENCOUNTER — HISTORICAL (OUTPATIENT)
Dept: LAB | Facility: HOSPITAL | Age: 71
End: 2021-09-13

## 2021-09-13 LAB
ALBUMIN SERPL-MCNC: 3.6 GM/DL (ref 3.4–4.8)
ALBUMIN/GLOB SERPL: 1.1 RATIO (ref 1.1–2)
ALP SERPL-CCNC: 77 UNIT/L (ref 40–150)
ALT SERPL-CCNC: 8 UNIT/L (ref 0–55)
APPEARANCE, UA: CLEAR
AST SERPL-CCNC: 19 UNIT/L (ref 5–34)
BACTERIA SPEC CULT: NORMAL
BILIRUB SERPL-MCNC: 0.4 MG/DL
BILIRUB UR QL STRIP: NEGATIVE
BILIRUBIN DIRECT+TOT PNL SERPL-MCNC: 0.2 MG/DL (ref 0–0.5)
BILIRUBIN DIRECT+TOT PNL SERPL-MCNC: 0.2 MG/DL (ref 0–0.8)
BUN SERPL-MCNC: 10.3 MG/DL (ref 9.8–20.1)
CALCIUM SERPL-MCNC: 8.7 MG/DL (ref 8.4–10.2)
CHLORIDE SERPL-SCNC: 93 MMOL/L (ref 98–107)
CHOLEST SERPL-MCNC: 146 MG/DL
CHOLEST/HDLC SERPL: 2 {RATIO} (ref 0–5)
CO2 SERPL-SCNC: 30 MMOL/L (ref 23–31)
COLOR UR: YELLOW
CREAT SERPL-MCNC: 0.71 MG/DL (ref 0.55–1.02)
ERYTHROCYTE [DISTWIDTH] IN BLOOD BY AUTOMATED COUNT: 14.8 % (ref 11.5–17)
EST. AVERAGE GLUCOSE BLD GHB EST-MCNC: 128.4 MG/DL
GLOBULIN SER-MCNC: 3.2 GM/DL (ref 2.4–3.5)
GLUCOSE (UA): NEGATIVE
GLUCOSE SERPL-MCNC: 89 MG/DL (ref 82–115)
HBA1C MFR BLD: 6.1 %
HCT VFR BLD AUTO: 36.6 % (ref 37–47)
HDLC SERPL-MCNC: 65 MG/DL (ref 35–60)
HGB BLD-MCNC: 11.8 GM/DL (ref 12–16)
HGB UR QL STRIP: NORMAL
KETONES UR QL STRIP: NEGATIVE
LDLC SERPL CALC-MCNC: 70 MG/DL (ref 50–140)
LEUKOCYTE ESTERASE UR QL STRIP: NORMAL
MCH RBC QN AUTO: 27.4 PG (ref 27–31)
MCHC RBC AUTO-ENTMCNC: 32.2 GM/DL (ref 33–36)
MCV RBC AUTO: 84.9 FL (ref 80–94)
NITRITE UR QL STRIP: NEGATIVE
PH UR STRIP: 6 [PH] (ref 5–9)
PLATELET # BLD AUTO: 306 X10(3)/MCL (ref 130–400)
PMV BLD AUTO: 8.9 FL (ref 9.4–12.4)
POTASSIUM SERPL-SCNC: 4.1 MMOL/L (ref 3.5–5.1)
PROT SERPL-MCNC: 6.8 GM/DL (ref 5.8–7.6)
PROT UR QL STRIP: NEGATIVE
RBC # BLD AUTO: 4.31 X10(6)/MCL (ref 4.2–5.4)
RBC #/AREA URNS HPF: NORMAL /HPF
SODIUM SERPL-SCNC: 131 MMOL/L (ref 136–145)
SP GR UR STRIP: 1.02 (ref 1–1.03)
SQUAMOUS EPITHELIAL, UA: NORMAL
TRIGL SERPL-MCNC: 57 MG/DL (ref 37–140)
TSH SERPL-ACNC: 1.59 UIU/ML (ref 0.35–4.94)
UROBILINOGEN UR STRIP-ACNC: 0.2
VLDLC SERPL CALC-MCNC: 11 MG/DL
WBC # SPEC AUTO: 10.1 X10(3)/MCL (ref 4.5–11.5)
WBC #/AREA URNS HPF: NORMAL /HPF

## 2021-12-03 LAB
COLOR STL: NORMAL
CONSISTENCY STL: NORMAL
HEMOCCULT SP1 STL QL: NEGATIVE

## 2021-12-04 LAB
COLOR STL: NORMAL
CONSISTENCY STL: NORMAL

## 2021-12-06 ENCOUNTER — HISTORICAL (OUTPATIENT)
Dept: LAB | Facility: HOSPITAL | Age: 71
End: 2021-12-06

## 2022-04-12 ENCOUNTER — HISTORICAL (OUTPATIENT)
Dept: ADMINISTRATIVE | Facility: HOSPITAL | Age: 72
End: 2022-04-12

## 2022-04-30 VITALS
WEIGHT: 192 LBS | BODY MASS INDEX: 32.78 KG/M2 | SYSTOLIC BLOOD PRESSURE: 142 MMHG | DIASTOLIC BLOOD PRESSURE: 88 MMHG | HEIGHT: 64 IN

## 2022-05-05 NOTE — HISTORICAL [facility] CERNER
This is a historical note converted from Stella. Formatting and pictures may have been removed.  Please reference Stella for original formatting and attached multimedia. Chief Complaint  fu right hand  History of Present Illness  69-year-old right-hand-dominant female?here following up right?fifth metacarpal base fracture, date of injury 4/20/2020.  Doing well.  Has been wearing splint.  No issues.  Minimal pain  Review of Systems  Constitutional:?no fever, fatigue, weakness  Eye:?no vision loss, eye redness, drainage, or pain  ENMT:?no sore throat, ear pain, sinus pain/congestion, nasal congestion/drainage  Respiratory:?no cough, no wheezing, no shortness of breath  Cardiovascular:?no chest pain, no palpitations, no edema  Gastrointestinal:?no nausea, vomiting, or diarrhea. No abdominal pain  Physical Exam  Vitals & Measurements  HT:?162?cm? WT:?89.5?kg? BMI:?34.1?  Right upper extremity  Minimal tenderness palpation over metacarpal base  No scissoring  No extensor lag  Neurovascularly unchanged, sensation intact to median/ulnar/radial nerve distribution.  5-5 strength to?finger flexors and extensors  ?  X-ray right hand:?Healing fifth metacarpal base fracture, no interval displacement  Assessment/Plan  Closed fracture of 5th metacarpal?S62.815A  ?Patient has healed this fracture clinically.  Return to clinic PRN  ?   Shyanne Cannon?was evaluated at the time of the encounter with?Dr Suazo.? HPI, PE and treatment plan was reviewed.? Treatment plan was reasonable and necessary.??Imaging was reviewed at the time of visit.??   Medications  acetaminophen-hydrocodone 325 mg-5 mg oral tablet, 1 tab(s), Oral, q6hr,? ?Not Taking, Completed Rx  Adderall 30 mg oral tablet, 30 mg= 1 tab(s), Oral, BID,? ?Not taking  Advair Diskus 250 mcg-50 mcg inhalation powder, 1 inh, Oral, BID  alPRAzolam 0.5 mg oral tablet, 0.5 mg= 1 tab(s), Oral, TID,? ?Not taking  alPRAzolam 1 mg oral tablet, 1 mg= 1 tab(s), Oral,  TID  amoxicillin-clavulanate 875 mg-125 mg oral tablet, 1 tab(s), Oral, BID,? ?Not taking  amphetamine-dextroamphetamine 10 mg oral tablet, 10 mg= 1 tab(s), Oral, BID  atorvastatin 40 mg oral tablet, 40 mg= 1 tab(s), Oral, qPM  betamethasone-clotrimazole 0.05%-1% topical cream, 1 karlos, TOP, BID  CeleBREX 100 mg oral capsule, 100 mg= 1 cap(s), Oral, BID,? ?Not taking  Cymbalta 60 mg oral delayed release capsule, 60 mg= 1 cap(s), Oral, Daily,? ?Unable to obtain  escitalopram 20 mg oral tablet, 20 mg= 1 tab(s), Oral, Daily  gabapentin 600 mg oral tablet, 600 mg= 1 tab(s), Oral, TID  loratadine 10 mg oral tablet, 10 mg= 1 tab(s), Oral, Daily  metformin 500 mg oral tablet, 500 mg= 1 tab(s), Oral, BID  methylPREDNISolone 4 mg oral tab,? ?Not taking  Mobic 7.5 mg oral tablet, 7.5 mg= 1 tab(s), Oral, Daily, PRN,? ?Not taking  naproxen 250 mg oral tablet, See Instructions,? ?Not taking  nystatin 100,000 units/mL oral suspension, 806152 units= 4 mL, Oral, QID  olanzapine 20 mg oral tablet-, 20 mg= 1 tab(s), Oral, qPM  omeprazole 40 mg oral DR capsule, 40 mg= 1 cap(s), Oral, Daily  oxcarbazepine 150 mg oral tablet, 150 mg= 1 tab(s), Oral, BID  Percocet 10/325, 1 tab(s), Oral, q4hr, PRN,? ?Not taking  ProAir HFA 90 mcg/inh inhalation aerosol with adapter, 1 puff(s), INH, q4hr, PRN  Spiriva

## 2022-05-05 NOTE — HISTORICAL [facility] CERNER
This is a historical note converted from Stella. Formatting and pictures may have been removed.  Please reference Stella for original formatting and attached multimedia. ?sacral pressure ulcer s/p debridement  History of Present Illness  ?   ??????This is a 71 yo WF who was in [facility]?SNF was sent here for worsening renal functions on 10/14/20. She was admitted in [facility] on 9/24/20 for Hypotensive shock, sepsis and ARF. She lives alone and was found by her son on the ground covered with feces. She was prob down for almost a whole week. She was found to have multiple wounds and was admitted to the ICU. CT brain done at that time was negative for CVA. Her renal functions and vitals stabilized and she was later discharged to SNF unit.?Pt? has not been able to ambulate since discharge from [facility]. States both her legs are weak and hurts a lot. She denies any fever, chills, diarrhea, SOB, cough, dysuria or hematuria. She has been having a poor appetite. She has a sacral decubitus and left heel ulcer both POA. Vitals was stable today. Labs done today shows she was in ARF, hydronephrosis,?mild hyperkalemia and has mild anemia. WBC counts was normal. She will be admitted for further evaluation. ?  ?   Wound medicine consulted 10/14/20?at [facility]?for evaluation of left heel DFU and?sacral pressure ulcer both POA.? Surgery was consulted for?operative debridement of?large sacral wound.? Patient states she thinks she has had these wounds since at least July?and that she has not been able to walk since?July as well?due to leg heaviness.? She reports history of diabetes and reports occasional tobacco use. Surgery consulted and she underwent operative debridement of sacral pressure ulcer on 10/17/20?surgery team. Podiatry consulted and following for unstageable left heel ulcer?and performed operative debridement. ?MRI revealed severe spinal stenosis?accounting for her?bilateral lower extremity?weakness?and inability to walk over  the last few months/incomplete paraplegia, neurosurgery consulted. She underwent partial L3 and complete L4 and L5 laminectomies with decompression and drain placement on 10/21/20 by . I initiated wound vac to sacrum? on 10/20/20 and plastic surgery was consulted to follow for surgical coverage once nutrition improved. 10/22/20 she underwent diverting colostomy. She was transferred to [facility] on 11/2/20 and I have been consulted to follow her here?and wound VAC was continued?to sacral wound?and she was placed on Clinitron.  ?   Today: Patient seen today for?initial wound assessment after transfer to LT.? Currently has wound VAC in place to sacral wound.? She denies complaints and reports appetite is improving.  Review of Systems  Negative except as detailed in HPI, all other systems reviewed and negative  Physical Exam  Vitals & Measurements  T:?36.5? ?C (Oral)? TMIN:?36.5? ?C (Oral)? TMAX:?36.7? ?C (Oral)? HR:?98(Peripheral)? RR:?18? BP:?121/72? SpO2:?94%?  General:?Pleasant obese?female, NAD, Clinitron mattress  Neurologic/Psychiatric: Patient is alert and oriented to person, place, time. Cooperative. Speech clear.??Cranial nerves II-XII intact.  Head/Eyes/Ears/Nose/Mouth/Throat:? Normocephalic, atraumatic.??  Cardiovascular: No JVD.??Regular rate and rhythm.?  Peripheral vascular: Color pink. ?Lower extremities warm with good coloration bilaterally. ?+2 pitting edema bilateral lower extremities. ?No palpable pulses?but?brisk?Doppler DP and PT heard?on the left. ?Scant hair. ?Capillary refill < 3 seconds.?  Respiratory: Respirations even and unlabored, no use of accessory muscles. Trachea midline.??  Gastrointestinal: Abdomen round, symmetric.  Genitourinary:?Ebnítez, colostomy  Musculoskeletal:?Generalized weakness to upper and lower extremities.? Dressing?dry and intact to the left heel with heel float boots in place bilaterally  Skin:?warm/dry. ?Wound to sacrum with wound VAC in place with good seal.  ?Sutures in place to midline?lower?back wound  ?   WOUND # 1 unstageable sacral pressure ulcer POA:?11 x 12.5 x 2.0 cm. ?Irregular shaped wound?sacrum and bilateral buttocks?with?greater than 75% granulation?tissue?beefy red granulation around the perimeter of the wound?pink?granulation towards the center?with?25% scattered fibrotic?slough.? No bone exposure.? No periwound maceration, induration or cellulitis.? Moderate serosanguineous drainage.? Measuring smaller than last week.  ?   ASSESSMENT:?  ARF - resolved  Hydronephrosis  Left heel?diabetic/ulcer with?necrosis POA.??X-ray negative. s/p debridement 10/20 ?Jr  Large unstageable sacral pressure ulcer POA s/p surgical debridement 10/17 large stage IV  ?- NPWT initiated 10/20/20  Severe?spinal stenosis w resultant incomplete paraplegia bedbound the last few months  - s/p partial L3 and complete L4 and L5 laminectomies 10/21/20  DM2. ?A1c 6.1 10/15/2020  COPD?  Morbid obesity?  Elevated CRP  Tobacco use  Severe protein calorie malnutrition. ?PAB 9.4  Fecal?incontinence with decreased sensory awareness. s/p diverting colostomy 10/22/20  arterial ultrasound?no significant flow reduction bilateral lower extremities  DNI status  ?   PLAN:  ?1. ?Sacral wound was assessed and redressed today improving with wound VAC.? We will continue wound VAC therapy. ?Dr. Yates was following patient?at [facility] for?possible surgical coverage later once PAB improves.? Recommend consulting Dr. Yates to follow patient at this facility as well.? Left heel wound per podiatry Dr. Norris.  ?2. Wound Care Orders:?Left heel -per podiatry.??Sacral wound -wound VAC continuous at 120 change twice a week.  ?3. Monitor for S & S of infection/deterioration. ?Afebrile. ?No WBCs.? Elevated CRP.? No sacral wound tissue cultures obtained with surgical debridement.? Sacral wound does not appear acutely infected.  ?4. Increase protein intake as renal functions allow and optimize nutrition to promote  wound healing. ?Severe?PCM, PAB trending up, nutritionist following. ?Aggressive nutritional support, continue wound vitamins.  ?5. Pressure offloading:?Clinitron, turn every 2 hours with wedge, float?heels?at all times while in bed. ?Case discussed with physical therapy limit sitting time to less than 45 minutes?in chair, must have 4 inch foam cushion in place  ?6. Incontinence care: Benítez,??single layer Chux pad,?diverting colostomy  ?7. Aggressive glucose control per attending.? A1c checked and stable.? Counseled on smoking cessation  ?8. Education:?Offloading, infection control, surgical debridement  ?9. Plan of care discussed with patient who verbalized understanding of plan?  ?10. Will follow up?at least weekly while admitted  ?   Total time spent coordinating care of patient greater than?60 minutes.  ?   Problem List/Past Medical History  Ongoing  ADD (attention deficit disorder)  Arthritis  COPD  Diabetes  Hypertension  Low back pain  Neck pain  Sciatic nerve  Skin cancer  Historical  Depression  Neuropathy  Vertigo  Procedure/Surgical History  Bypass Sigmoid Colon to Cutaneous, Percutaneous Endoscopic Approach (10/29/2020)  Colostomy Laparoscopic (Anterior) (10/29/2020)  Lumbar Fusion With O-Arm (.) (10/21/2020)  Release Lumbar Spinal Cord, Open Approach (10/21/2020)  Debridement Foot (Left) (10/20/2020)  Excision of Left Foot Subcutaneous Tissue and Fascia, Open Approach (10/20/2020)  Excision of Left Hip Muscle, Open Approach (10/17/2020)  Excision of Right Hip Muscle, Open Approach (10/17/2020)  Incision & Drainage Major (Posterior) (10/17/2020)  Insertion of Infusion Device into Superior Vena Cava, Percutaneous Approach (09/24/2020)  Injection procedure for sacroiliac joint; provision of anesthetic, steroid and/or other therapeutic agent, with or without arthrography (08/18/2020)  Injection Sacroiliac Joint (Bilateral) (08/18/2020)  Introduction of Anti-inflammatory into Muscle, Percutaneous Approach  (08/18/2020)  Application of short arm splint (forearm to hand); static (04/21/2020)  Immobilization of Right Lower Arm using Splint (04/21/2020)  Appendectomy  Cholecystectomy  Left Shoulder Repair  Lung Surgery   Medications  Inpatient  acetaminophen-HYDROcodone, 5mg-325mg, Oral, q4hr, PRN  albuterol 0.083% inhalation solution, 2.5 mg= 3 mL, NEB, q6hr Resp, PRN  atorvastatin 40 mg oral tablet, 40 mg= 1 tab(s), Oral, qPM  Cymbalta 60 mg oral delayed release capsule, 60 mg= 1 cap(s), Oral, Daily  Dextrose 50% and Water (50 mL vial/syringe), 12.5 gm= 25 mL, IV Push, Once, PRN  Dextrose 50% and Water (50 mL vial/syringe), 12.5 gm= 25 mL, IV Push, As Directed, PRN  Dextrose 50% and Water (50 mL vial/syringe), 25 gm= 50 mL, IV Push, As Directed, PRN  Dulcolax Laxative 10 mg RECTAL suppository, 10 mg= 1 supp, MS (rectal), q24hr, PRN  ferrous sulfate 325 mg (65 mg elemental iron) oral enteric coated tablet, 325 mg= 1 tab(s), Oral, BID  gabapentin 600 mg oral tablet, 600 mg= 1 tab(s), Oral, TID  glucagon, 1 mg= 1 EA, IM, q10min, PRN  glucagon, 1 mg= 1 EA, IM, q10min, PRN  hydrALAZINE 20 mg/mL injectable solution, 10 mg= 0.5 mL, IV, q4hr, PRN  insulin lispro, 2-14 units, Subcutaneous, As Directed, PRN  labetalol 5 mg/mL intravenous solution, 10 mg= 2 mL, IV Push, q2hr, PRN  Lovenox 30 mg/0.3 mL subcutaneous solution, 30 mg= 0.3 mL, Subcutaneous, BID  Nitrostat 0.4 mg sublingual tab, 0.4 mg= 1 tab(s), SL, q5min, PRN  Pepcid 20 mg oral tablet, 20 mg= 1 tab(s), Oral, BID, PRN  Perforomist 20 mcg/2 mL inhalation solution, 20 mcg= 2 mL, NEB, BID-Resp  Pulmicort Respules 0.5 mg/2 mL inhalation suspension, 0.5 mg= 2 mL, NEB, BID  Santyl 250 units/g topical ointment, 1 karlos, TOP, BID  Sodium Chloride 0.9% Normal Saline Flush, 10 mL, IV Push, q12hr  Ultram, 50 mg= 1 tab(s), Oral, q4hr, PRN  Vistaril 50 mg oral capsule, 50 mg= 1 cap(s), Oral, At Bedtime, PRN  Vitamin C 500 mg oral tablet, 500 mg= 1 tab(s), Oral, Daily  zinc sulfate  220 mg oral capsule, 220 mg= 1 cap(s), Oral, Daily  Zofran 2 mg/mL injectable solution, 4 mg= 2 mL, IV Push, q4hr, PRN  Home  acetaminophen-HYDROcodone, Oral, q4hr, PRN  Advair Diskus 250 mcg-50 mcg inhalation powder, 1 inh, Oral, BID,? ?Still taking, not as prescribed: PRN  Ambien 5 mg oral tablet, 5 mg= 1 tab(s), Oral, qPM, PRN  atorvastatin 40 mg oral tablet, 40 mg= 1 tab(s), Oral, qPM  Chloraseptic mucous membrane lozenge, 1 lozenge(s), Oral, q1hr, PRN  Cymbalta 60 mg oral delayed release capsule, 60 mg= 1 cap(s), Oral, Daily  Dakins Half Strength 0.25% topical solution, 1 karlos, TOP, BID  Dulcolax Laxative 10 mg RECTAL suppository, 10 mg= 1 supp, SC (rectal), Daily, PRN  ferrous sulfate 325 mg (65 mg elemental iron) oral enteric coated tablet, 325 mg= 1 tab(s), Oral, BID  gabapentin 600 mg oral tablet, 600 mg= 1 tab(s), Oral, TID  hydrALAZINE 20 mg/mL injectable solution, 10 mg= 0.5 mL, IV, q4hr, PRN  insulin lispro 100 units/mL injectable solution, 2-14 units, Subcutaneous, As Directed, PRN  Lovenox 30 mg/0.3 mL subcutaneous solution, 30 mg= 0.3 mL, Subcutaneous, BID  metformin 500 mg oral tablet, 500 mg= 1 tab(s), Oral, BID,? ?Still taking, not as prescribed: TID  morphine 4 mg/mL preservative-free intravenous solution, 2 mg= 0.5 mL, IV, q2hr, PRN  phenol 1.4% topical spray, 1 spray(s), TOP, q1hr, PRN  ProAir HFA 90 mcg/inh inhalation aerosol with adapter, 1 puff(s), INH, q4hr, PRN  Santyl 250 units/g topical ointment, 1 karlos, TOP, BID  sodium hypochlorite 0.25% topical solution  triamcin/mycostst-mom, TOP, BID  Ultram 50 mg oral tablet, 50 mg= 1 tab(s), Oral, q4hr, PRN  Vitamin C 500 mg oral tablet, 500 mg= 1 tab(s), Oral, Daily  zinc sulfate 220 mg oral capsule, 220 mg= 1 cap(s), Oral, Daily  Zofran 2 mg/mL injectable solution, 4 mg= 2 mL, IV Push, q4hr, PRN  Allergies  Keflex?(Hives)  Levaquin?(Hives)  Social History  Abuse/Neglect  No, No, 11/02/2020  No, 10/14/2020  No, 09/24/2020  No, 09/23/2020  Alcohol -  Denies Alcohol Use, 04/21/2015  Never, 08/10/2020  Sexual  Gender Identity Identifies as female., 04/29/2020  Spiritual/Cultural  Church, 08/18/2020  Substance Use - Denies Substance Abuse, 04/21/2015  Never, 08/10/2020  Tobacco - High Risk, 04/21/2015  Never (less than 100 in lifetime), N/A, 11/02/2020  Never (less than 100 in lifetime), No, 10/14/2020  Never (less than 100 in lifetime), N/A, 09/24/2020  Never (less than 100 in lifetime), No, 09/23/2020  Family History  Cancer: Father.  Depression.: Mother and Brother.  Stroke: Mother.  Immunizations  Vaccine Date Status Comments   influenza virus vaccine, inactivated 11/03/2020 Given Early/Late Reason:  Nursing Judgment   influenza virus vaccine, inactivated - Not Given Patient Refuses     tuberculin purified protein derivative 09/28/2020 Given    Lab Results  Labs Last 72 Hours?  ?Chemistry? Hematology/Coagulation?   Sodium Lvl: 141 mmol/L (11/04/20 02:45:00) WBC:?13.3 x10(3)/mcL?High (11/03/20 02:40:00)   Sodium Lvl: 139 mmol/L (11/03/20 02:40:00) RBC:?3.48 x10(6)/mcL?Low (11/03/20 02:40:00)   Potassium Lvl:?3.2 mmol/L?Low (11/04/20 02:45:00) Hgb:?9.5 gm/dL?Low (11/03/20 02:40:00)   Potassium Lvl: 3.8 mmol/L (11/03/20 02:40:00) Hct:?30.8 %?Low (11/03/20 02:40:00)   Chloride:?93 mmol/L?Low (11/04/20 02:45:00) Platelet:?530 x10(3)/mcL?High (11/03/20 02:40:00)   Chloride:?92 mmol/L?Low (11/03/20 02:40:00) MCV: 88.5 fL (11/03/20 02:40:00)   CO2:?38 mmol/L?High (11/04/20 02:45:00) MCH: 27.3 pg (11/03/20 02:40:00)   CO2:?37 mmol/L?High (11/03/20 02:40:00) MCHC:?30.8 gm/dL?Low (11/03/20 02:40:00)   Calcium Lvl: 9.4 mg/dL (11/04/20 02:45:00) RDW: 16.2 % (11/03/20 02:40:00)   Calcium Lvl: 9.6 mg/dL (11/03/20 02:40:00) MPV: 8.9 fL (11/03/20 02:40:00)   Magnesium Lvl: 1.73 mg/dL (11/03/20 02:40:00) Abs Neut:?9.6 x10(3)/mcL?High (11/03/20 02:40:00)   Glucose Lvl: 102 mg/dL (11/04/20 02:45:00) Neutro Auto: 72.3 % (11/03/20 02:40:00)   Glucose Lvl: 105 mg/dL (11/03/20  02:40:00) Lymph Auto: 16.7 % (11/03/20 02:40:00)   BUN: 16.3 mg/dL (11/04/20 02:45:00) Mono Auto: 7.1 % (11/03/20 02:40:00)   BUN: 17.4 mg/dL (11/03/20 02:40:00) Eos Auto: 2 % (11/03/20 02:40:00)   Creatinine: 1.02 mg/dL (11/04/20 02:45:00) Abs Eos: 0.27 x10(3)/mcL (11/03/20 02:40:00)   Creatinine:?1.34 mg/dL?High (11/03/20 02:40:00) Basophil Auto: 0.4 % (11/03/20 02:40:00)   Est Creat Clearance Ser: 76.83 mL/min (11/04/20 03:28:14) Abs Neutro:?9.6 x10(3)/mcL?High (11/03/20 02:40:00)   Est Creat Clearance Ser: 58.49 mL/min (11/03/20 03:32:50) Abs Lymph: 2.22 x10(3)/mcL (11/03/20 02:40:00)   BUN/Creat Ratio: 16 (11/04/20 02:45:00) Abs Mono: 0.95 x10(3)/mcL (11/03/20 02:40:00)   eGFR-AA: >60 (11/04/20 02:45:00) Abs Baso: 0.05 x10(3)/mcL (11/03/20 02:40:00)   eGFR-AA: 50 (11/03/20 02:40:00) NRBC%: 0.0 (11/03/20 02:40:00)   eGFR-MEGHAN: 57 mL/min/1.73 m2 (11/04/20 02:45:00) IG%:?1.5 %?High (11/03/20 02:40:00)   eGFR-MEGHAN: 42 mL/min/1.73 m2 (11/03/20 02:40:00) IG#:?0.2?High (11/03/20 02:40:00)   Bili Total: 0.2 mg/dL (11/03/20 02:40:00) Sed Rate:?98 mm/hr?High (11/03/20 02:40:00)   Bili Direct: 0.1 mg/dL (11/03/20 02:40:00)    Bili Indirect: 0.1 mg/dL (11/03/20 02:40:00)    AST: 14 unit/L (11/03/20 02:40:00)    ALT: 10 unit/L (11/03/20 02:40:00)    Alk Phos: 76 unit/L (11/03/20 02:40:00)    Total Protein: 6.1 gm/dL (11/03/20 02:40:00)    Albumin Lvl:?2 gm/dL?Low (11/03/20 02:40:00)    Globulin:?4.1 gm/dL?High (11/03/20 02:40:00)    A/G Ratio:?0.5 ratio?Low (11/03/20 02:40:00)    Phosphorus:?5.4 mg/dL?High (11/04/20 02:45:00)    Phosphorus:?5.9 mg/dL?High (11/03/20 02:40:00)    Iron Lvl:?23 ug/dL?Low (11/03/20 02:40:00)    Transferrin: 185 mg/dL (11/03/20 02:40:00)    TIBC:?215 ug/dL?Low (11/03/20 02:40:00)    Iron Sat:?11 %?Low (11/03/20 02:40:00)    Ferritin Lvl: 107.5 ng/mL (11/03/20 02:40:00)    CRP High Sens:?49.41 mg/L?High (11/03/20 02:40:00)    UIBC: 192 ug/dL (11/03/20 02:40:00)    Chol: 140 mg/dL (11/04/20 03:10:00)     HDL:?38 mg/dL?Low (20 03:10:00)    Tri mg/dL (20 03:10:00)    LDL: 83 mg/dL (20 03:10:00)    Chol/HDL: 4 (20 03:10:00)    VLDL: 19 (20 03:10:00)    Blood Glucose, POC:?219 mg/dL?High (20 21:00:00)    Blood Glucose, POC:?135 mg/dL?High (20 16:00:00)    Blood Glucose, POC:?183 mg/dL?High (20 12:00:00)

## 2022-05-05 NOTE — HISTORICAL [facility]ER
This is a historical note converted from Stella. Formatting and pictures may have been removed.  Please reference Stella for original formatting and attached multimedia. Chief Complaint  referral for right hand fx  History of Present Illness  69 Years?old ?RHD?Female?non-smoker?presents to?sports medicine clinic?for?initial evaluation?for right 5th metacarpal fracture.  Patient is?1.5 weeks post injury ( DOI?4/20/2020 ).  Patient states She tripped on a cement parking barrier and fell. She has been doing well since her ED visit. she is able to control her pain with Tylenol and Advil but is worried she is taking too much.  GERTRUDE: fall  Previously seen ED with XR and placed in a ulnar gutter splint.  Previous treatment:?none  previous injuries:?denies  Current pain level: 5/10 ( rated as?none)?medication helping  associated symptoms:?no numbness and tingling;?no swelling;?no skin changes;?no weakness;?no decrease in ROM  Family history:?non contributory  Review of Systems  Constitutional: no fever, no chills, no weight loss  CV: no swelling, no edema?  Resp: no shortness of breath, no cough  GI: no fecal incontinence  : no urinary retention, no urinary incontinence  Skin: no rash, no wound  Neuro: no numbness/tingling, no weakness, no saddle anesthesia  MSK: as above  Psych: no depression, no anxiety  Heme/Lymph: no easy bruising, no easy bleeding, no lymphadenopathy  ?  Physical Exam  Vitals & Measurements  HT:?162?cm? WT:?88?kg? BMI:?33.53?  General:?well developed; well nourished; cooperative  Neck: no abnormalities noted  Eyes: no injection in the conjunctiva, no lid lag noted  PSYCH: alert and oriented with?appropriate mood and affect  SKIN: inspection and palpation of skin and soft tissue normal; no scars noted on upper/lower extremities  CV: vascular integrity noted; +2 symmetrical pulses, no edema  Respiratory: no increased respiratory effort noted  NEURO: sensation intact by light touch; DTRs +2 bilateral and  symmetrical  LYMPH: no LAD noted  ?  MSK Exam:?  Right?hand  Inspection:?swelling? to the??lateral right?hand;?no erythema :?no bruising?  Palpation:?TTP to the base of the 5th metacarpal ,?  ROM:?mildly decreased Strength:?mild decrease  Neurovascular: intact sensation to light touch, pulses 2+, capillary refill?less than 2 seconds?  Assessment/Plan  1.?Closed fracture of 5th metacarpal?S62.308A  ?Plan:?Patient with initial conservative management at this time., fracture at the base of the 5th metacarpal is not significantly displaced. discussed treatment options with patient.  Rads:?imaging ordered and independently reviewed by me, discussed with patient, radiologist read pending  Immobilization:?Velcro ulnar gutter splint placed  Therapy:?none  Rx:?OTC medication PRN, Tylenol 1000 mg TID as needed or ibuprofen 400-800 mg ever 4-6 hours as needed.?Patient given a script for naproxen 250 mg BID, told to avoid other anti inflammatories.  Work-up:?no additional imaging needed at this time  Activity:?as tolerated  RTC:?4 weeks ?with repeat XR  ?  Ordered:  naproxen, 250 mg = 1 tab(s), Oral, BID, PRN PRN as needed for pain, # 60 tab(s), 0 Refill(s), Pharmacy: [facility]/pharmacy #[id number], 162, cm, Height/Length Dosing, 04/29/20 11:58:00 CDT, 88, kg, Weight Dosing, 04/29/20 11:58:00 CDT  XR Hand Right Minimum 3 Views, Routine, 04/29/20 12:02:00 CDT, Fracture, None, Ambulatory, Rad Type, Closed fracture of 5th metacarpal, Not Scheduled, 04/29/20 12:02:00 CDT  ?  Shyanne Cannon?was evaluated at the time of the encounter with?Dr Henderson.? HPI, PE and treatment plan was reviewed.? Treatment plan was reasonable and necessary.??Imaging was reviewed at the time of visit.??   Medications  acetaminophen-hydrocodone 325 mg-5 mg oral tablet, 1 tab(s), Oral, q6hr,? ?Not Taking, Completed Rx  Adderall 30 mg oral tablet, 30 mg= 1 tab(s), Oral, BID,? ?Not taking  Advair Diskus 250 mcg-50 mcg inhalation powder, 1 inh, Oral, BID  alPRAzolam 0.5 mg oral  tablet, 0.5 mg= 1 tab(s), Oral, TID,? ?Not taking  alPRAzolam 1 mg oral tablet, 1 mg= 1 tab(s), Oral, TID  amoxicillin-clavulanate 875 mg-125 mg oral tablet, 1 tab(s), Oral, BID,? ?Not taking  atorvastatin 40 mg oral tablet, 40 mg= 1 tab(s), Oral, qPM  betamethasone-clotrimazole 0.05%-1% topical cream, 1 karlos, TOP, BID  CeleBREX 100 mg oral capsule, 100 mg= 1 cap(s), Oral, BID,? ?Not taking  Cymbalta 60 mg oral delayed release capsule, 60 mg= 1 cap(s), Oral, Daily,? ?Unable to obtain  gabapentin 600 mg oral tablet, 600 mg= 1 tab(s), Oral, TID  loratadine 10 mg oral tablet, 10 mg= 1 tab(s), Oral, Daily  metformin 500 mg oral tablet, 500 mg= 1 tab(s), Oral, BID  methylPREDNISolone 4 mg oral tab,? ?Not taking  Mobic 7.5 mg oral tablet, 7.5 mg= 1 tab(s), Oral, Daily, PRN,? ?Not taking  naproxen 250 mg oral tablet, 250 mg= 1 tab(s), Oral, BID, PRN  nystatin 100,000 units/mL oral suspension, 340264 units= 4 mL, Oral, QID  olanzapine 20 mg oral tablet-, 20 mg= 1 tab(s), Oral, qPM  omeprazole 40 mg oral DR capsule, 40 mg= 1 cap(s), Oral, Daily  oxcarbazepine 150 mg oral tablet, 150 mg= 1 tab(s), Oral, BID  Percocet 10/325, 1 tab(s), Oral, q4hr, PRN,? ?Not taking  ProAir HFA 90 mcg/inh inhalation aerosol with adapter, 1 puff(s), INH, q4hr, PRN  Spiriva  Diagnostic Results  XR right hand my read  mildly displaced fracture of the base of the 5th metacarpal  ?   (04/21/2020 13:01 CDT XR Hand Right Minimum 3 Views)  ?  FINDINGS  Mildly displaced fractures of the base of the 5th metacarpal is  present. No other convincing acute osseous disruption is identified.  Joint spacing and alignment are preserved. Mild regional degenerative  changes are evident.  ?  There is diffuse dorsal soft tissue swelling.  ?  IMPRESSION  Mildly displaced 5th metacarpal base fracture. [1]     [1]?XR Hand Right Minimum 3 Views; Froy Thornton MD 04/21/2020 13:01 CDT

## 2022-10-06 ENCOUNTER — LAB VISIT (OUTPATIENT)
Dept: LAB | Facility: HOSPITAL | Age: 72
End: 2022-10-06
Attending: FAMILY MEDICINE
Payer: MEDICARE

## 2022-10-06 DIAGNOSIS — M54.50 LUMBAGO: ICD-10-CM

## 2022-10-06 DIAGNOSIS — E11.9 DIABETES MELLITUS WITHOUT COMPLICATION: Primary | ICD-10-CM

## 2022-10-06 DIAGNOSIS — K21.9 GASTROESOPHAGEAL REFLUX DISEASE, UNSPECIFIED WHETHER ESOPHAGITIS PRESENT: ICD-10-CM

## 2022-10-06 LAB
ALBUMIN SERPL-MCNC: 3.5 GM/DL (ref 3.4–4.8)
ALBUMIN/GLOB SERPL: 1 RATIO (ref 1.1–2)
ALP SERPL-CCNC: 78 UNIT/L (ref 40–150)
ALT SERPL-CCNC: 11 UNIT/L (ref 0–55)
AST SERPL-CCNC: 11 UNIT/L (ref 5–34)
BILIRUBIN DIRECT+TOT PNL SERPL-MCNC: 0.4 MG/DL
BUN SERPL-MCNC: 10.6 MG/DL (ref 9.8–20.1)
CALCIUM SERPL-MCNC: 9.2 MG/DL (ref 8.4–10.2)
CHLORIDE SERPL-SCNC: 98 MMOL/L (ref 98–107)
CHOLEST SERPL-MCNC: 174 MG/DL
CHOLEST/HDLC SERPL: 3 {RATIO} (ref 0–5)
CO2 SERPL-SCNC: 28 MMOL/L (ref 23–31)
CREAT SERPL-MCNC: 0.78 MG/DL (ref 0.55–1.02)
ERYTHROCYTE [DISTWIDTH] IN BLOOD BY AUTOMATED COUNT: 14.2 % (ref 11.5–17)
EST. AVERAGE GLUCOSE BLD GHB EST-MCNC: 134.1 MG/DL
GFR SERPLBLD CREATININE-BSD FMLA CKD-EPI: >60 MLS/MIN/1.73/M2
GLOBULIN SER-MCNC: 3.5 GM/DL (ref 2.4–3.5)
GLUCOSE SERPL-MCNC: 138 MG/DL (ref 82–115)
HBA1C MFR BLD: 6.3 %
HCT VFR BLD AUTO: 46.7 % (ref 37–47)
HDLC SERPL-MCNC: 69 MG/DL (ref 35–60)
HGB BLD-MCNC: 14.7 GM/DL (ref 12–16)
LDLC SERPL CALC-MCNC: 81 MG/DL (ref 50–140)
MAGNESIUM SERPL-MCNC: 1.5 MG/DL (ref 1.6–2.6)
MCH RBC QN AUTO: 27.6 PG (ref 27–31)
MCHC RBC AUTO-ENTMCNC: 31.5 MG/DL (ref 33–36)
MCV RBC AUTO: 87.8 FL (ref 80–94)
PLATELET # BLD AUTO: 349 X10(3)/MCL (ref 130–400)
PMV BLD AUTO: 9.1 FL (ref 7.4–10.4)
POTASSIUM SERPL-SCNC: 4.5 MMOL/L (ref 3.5–5.1)
PROT SERPL-MCNC: 7 GM/DL (ref 5.8–7.6)
RBC # BLD AUTO: 5.32 X10(6)/MCL (ref 4.2–5.4)
SODIUM SERPL-SCNC: 137 MMOL/L (ref 136–145)
TRIGL SERPL-MCNC: 119 MG/DL (ref 37–140)
TSH SERPL-ACNC: 1.14 UIU/ML (ref 0.35–4.94)
VLDLC SERPL CALC-MCNC: 24 MG/DL
WBC # SPEC AUTO: 14.2 X10(3)/MCL (ref 4.5–11.5)

## 2022-10-06 PROCEDURE — 80061 LIPID PANEL: CPT

## 2022-10-06 PROCEDURE — 80053 COMPREHEN METABOLIC PANEL: CPT

## 2022-10-06 PROCEDURE — 85027 COMPLETE CBC AUTOMATED: CPT

## 2022-10-06 PROCEDURE — 83036 HEMOGLOBIN GLYCOSYLATED A1C: CPT

## 2022-10-06 PROCEDURE — 84443 ASSAY THYROID STIM HORMONE: CPT

## 2022-10-06 PROCEDURE — 81001 URINALYSIS AUTO W/SCOPE: CPT

## 2022-10-06 PROCEDURE — 36415 COLL VENOUS BLD VENIPUNCTURE: CPT

## 2022-10-06 PROCEDURE — 83735 ASSAY OF MAGNESIUM: CPT

## 2022-10-07 LAB
APPEARANCE UR: CLEAR
BACTERIA #/AREA URNS AUTO: ABNORMAL /HPF
BILIRUB UR QL STRIP.AUTO: ABNORMAL MG/DL
CAOX CRY URNS QL MICRO: ABNORMAL /HPF
COLOR UR AUTO: ABNORMAL
GLUCOSE UR QL STRIP.AUTO: NEGATIVE MG/DL
KETONES UR QL STRIP.AUTO: ABNORMAL MG/DL
LEUKOCYTE ESTERASE UR QL STRIP.AUTO: NEGATIVE UNIT/L
NITRITE UR QL STRIP.AUTO: NEGATIVE
PH UR STRIP.AUTO: 5.5 [PH]
PROT UR QL STRIP.AUTO: NEGATIVE MG/DL
RBC #/AREA URNS AUTO: ABNORMAL /HPF
RBC UR QL AUTO: NEGATIVE UNIT/L
SP GR UR STRIP.AUTO: >=1.03
SQUAMOUS #/AREA URNS AUTO: ABNORMAL /HPF
UROBILINOGEN UR STRIP-ACNC: 0.2 MG/DL
WBC #/AREA URNS AUTO: ABNORMAL /HPF

## 2022-11-29 ENCOUNTER — LAB VISIT (OUTPATIENT)
Dept: LAB | Facility: HOSPITAL | Age: 72
End: 2022-11-29
Attending: FAMILY MEDICINE
Payer: MEDICARE

## 2022-11-29 DIAGNOSIS — Z00.00 ROUTINE GENERAL MEDICAL EXAMINATION AT A HEALTH CARE FACILITY: Primary | ICD-10-CM

## 2022-11-29 LAB
HEMOCCULT SP1 STL QL: NEGATIVE
HEMOCCULT SP2 STL QL: POSITIVE
HEMOCCULT SP3 STL QL: NEGATIVE

## 2022-11-29 PROCEDURE — 82272 OCCULT BLD FECES 1-3 TESTS: CPT

## 2023-08-29 DIAGNOSIS — Z12.31 OTHER SCREENING MAMMOGRAM: Primary | ICD-10-CM

## 2023-09-07 DIAGNOSIS — M25.511 RIGHT SHOULDER PAIN: Primary | ICD-10-CM

## 2023-09-11 ENCOUNTER — LAB VISIT (OUTPATIENT)
Dept: LAB | Facility: HOSPITAL | Age: 73
End: 2023-09-11
Attending: FAMILY MEDICINE
Payer: MEDICARE

## 2023-09-11 DIAGNOSIS — M65.9 SAPHO SYNDROME: ICD-10-CM

## 2023-09-11 DIAGNOSIS — R26.81 UNSTEADY: ICD-10-CM

## 2023-09-11 DIAGNOSIS — M54.50 LUMBAGO: ICD-10-CM

## 2023-09-11 DIAGNOSIS — M25.511 RIGHT SHOULDER PAIN: ICD-10-CM

## 2023-09-11 DIAGNOSIS — R60.9 EDEMA, UNSPECIFIED TYPE: ICD-10-CM

## 2023-09-11 DIAGNOSIS — K21.9 GASTROESOPHAGEAL REFLUX DISEASE, UNSPECIFIED WHETHER ESOPHAGITIS PRESENT: ICD-10-CM

## 2023-09-11 DIAGNOSIS — G25.9 MOVEMENT DISORDER: ICD-10-CM

## 2023-09-11 DIAGNOSIS — R29.6 FALLS FREQUENTLY: ICD-10-CM

## 2023-09-11 DIAGNOSIS — F41.9 ANXIETY HYPERVENTILATION: ICD-10-CM

## 2023-09-11 DIAGNOSIS — F17.200 TOBACCO USE DISORDER: ICD-10-CM

## 2023-09-11 DIAGNOSIS — M85.80 SAPHO SYNDROME: ICD-10-CM

## 2023-09-11 DIAGNOSIS — K62.5 HEMORRHAGE OF RECTUM AND ANUS: ICD-10-CM

## 2023-09-11 DIAGNOSIS — R41.3 MEMORY LOSS: ICD-10-CM

## 2023-09-11 DIAGNOSIS — F90.0 ADHD, PREDOMINANTLY INATTENTIVE TYPE: ICD-10-CM

## 2023-09-11 DIAGNOSIS — J44.89 OBSTRUCTIVE CHRONIC BRONCHITIS WITHOUT EXACERBATION: ICD-10-CM

## 2023-09-11 DIAGNOSIS — F45.8 ANXIETY HYPERVENTILATION: ICD-10-CM

## 2023-09-11 DIAGNOSIS — E11.9 DIABETES MELLITUS WITHOUT COMPLICATION: ICD-10-CM

## 2023-09-11 DIAGNOSIS — L70.9 SAPHO SYNDROME: ICD-10-CM

## 2023-09-11 DIAGNOSIS — J30.9 SPASMODIC RHINORRHEA: Primary | ICD-10-CM

## 2023-09-11 DIAGNOSIS — K57.90 DIVERTICULOSIS: ICD-10-CM

## 2023-09-11 DIAGNOSIS — M86.9 SAPHO SYNDROME: ICD-10-CM

## 2023-09-11 DIAGNOSIS — M51.9 INTERVERTEBRAL DISC DISORDER: ICD-10-CM

## 2023-09-11 DIAGNOSIS — L40.3 SAPHO SYNDROME: ICD-10-CM

## 2023-09-11 DIAGNOSIS — F32.9 MAJOR DEPRESSIVE DISORDER, SINGLE EPISODE, UNSPECIFIED: ICD-10-CM

## 2023-09-11 LAB
ALBUMIN SERPL-MCNC: 3.8 G/DL (ref 3.4–4.8)
ALBUMIN/GLOB SERPL: 1.1 RATIO (ref 1.1–2)
ALP SERPL-CCNC: 80 UNIT/L (ref 40–150)
ALT SERPL-CCNC: 14 UNIT/L (ref 0–55)
AST SERPL-CCNC: 14 UNIT/L (ref 5–34)
BILIRUB SERPL-MCNC: 0.4 MG/DL
BUN SERPL-MCNC: 12.8 MG/DL (ref 9.8–20.1)
CALCIUM SERPL-MCNC: 9.4 MG/DL (ref 8.4–10.2)
CHLORIDE SERPL-SCNC: 98 MMOL/L (ref 98–107)
CHOLEST SERPL-MCNC: 212 MG/DL
CHOLEST/HDLC SERPL: 4 {RATIO} (ref 0–5)
CO2 SERPL-SCNC: 25 MMOL/L (ref 23–31)
CREAT SERPL-MCNC: 0.88 MG/DL (ref 0.55–1.02)
ERYTHROCYTE [DISTWIDTH] IN BLOOD BY AUTOMATED COUNT: 14 % (ref 11.5–17)
EST. AVERAGE GLUCOSE BLD GHB EST-MCNC: 145.6 MG/DL
GFR SERPLBLD CREATININE-BSD FMLA CKD-EPI: >60 MLS/MIN/1.73/M2
GLOBULIN SER-MCNC: 3.5 GM/DL (ref 2.4–3.5)
GLUCOSE SERPL-MCNC: 172 MG/DL (ref 82–115)
HBA1C MFR BLD: 6.7 %
HCT VFR BLD AUTO: 45.4 % (ref 37–47)
HDLC SERPL-MCNC: 58 MG/DL (ref 35–60)
HGB BLD-MCNC: 14 G/DL (ref 12–16)
LDLC SERPL CALC-MCNC: 119 MG/DL (ref 50–140)
MCH RBC QN AUTO: 26.7 PG (ref 27–31)
MCHC RBC AUTO-ENTMCNC: 30.8 G/DL (ref 33–36)
MCV RBC AUTO: 86.6 FL (ref 80–94)
PLATELET # BLD AUTO: 327 X10(3)/MCL (ref 130–400)
PMV BLD AUTO: 9.5 FL (ref 7.4–10.4)
POTASSIUM SERPL-SCNC: 4.1 MMOL/L (ref 3.5–5.1)
PROT SERPL-MCNC: 7.3 GM/DL (ref 5.8–7.6)
RBC # BLD AUTO: 5.24 X10(6)/MCL (ref 4.2–5.4)
SODIUM SERPL-SCNC: 134 MMOL/L (ref 136–145)
TRIGL SERPL-MCNC: 176 MG/DL (ref 37–140)
TSH SERPL-ACNC: 1.97 UIU/ML (ref 0.35–4.94)
VLDLC SERPL CALC-MCNC: 35 MG/DL
WBC # SPEC AUTO: 12.62 X10(3)/MCL (ref 4.5–11.5)

## 2023-09-11 PROCEDURE — 80053 COMPREHEN METABOLIC PANEL: CPT

## 2023-09-11 PROCEDURE — 83036 HEMOGLOBIN GLYCOSYLATED A1C: CPT

## 2023-09-11 PROCEDURE — 80061 LIPID PANEL: CPT

## 2023-09-11 PROCEDURE — 81001 URINALYSIS AUTO W/SCOPE: CPT

## 2023-09-11 PROCEDURE — 36415 COLL VENOUS BLD VENIPUNCTURE: CPT

## 2023-09-11 PROCEDURE — 84443 ASSAY THYROID STIM HORMONE: CPT

## 2023-09-11 PROCEDURE — 85027 COMPLETE CBC AUTOMATED: CPT

## 2023-09-13 ENCOUNTER — HOSPITAL ENCOUNTER (OUTPATIENT)
Dept: RADIOLOGY | Facility: HOSPITAL | Age: 73
Discharge: HOME | End: 2023-09-13
Attending: FAMILY MEDICINE
Payer: MEDICARE

## 2023-09-13 DIAGNOSIS — Z12.31 OTHER SCREENING MAMMOGRAM: ICD-10-CM

## 2023-09-13 LAB
APPEARANCE UR: ABNORMAL
BACTERIA #/AREA URNS AUTO: ABNORMAL /HPF
BILIRUB UR QL STRIP.AUTO: NEGATIVE
COLOR UR: ABNORMAL
GLUCOSE UR QL STRIP.AUTO: NEGATIVE
KETONES UR QL STRIP.AUTO: NEGATIVE
LEUKOCYTE ESTERASE UR QL STRIP.AUTO: NEGATIVE
NITRITE UR QL STRIP.AUTO: POSITIVE
PH UR STRIP.AUTO: 5 [PH]
PROT UR QL STRIP.AUTO: NEGATIVE
RBC #/AREA URNS AUTO: ABNORMAL /HPF
RBC UR QL AUTO: ABNORMAL
SP GR UR STRIP.AUTO: 1.01 (ref 1–1.03)
SQUAMOUS #/AREA URNS AUTO: ABNORMAL /HPF
UROBILINOGEN UR STRIP-ACNC: 0.2
WBC #/AREA URNS AUTO: ABNORMAL /HPF

## 2023-09-13 PROCEDURE — 77067 MAMMO DIGITAL SCREENING BILAT WITH TOMO: ICD-10-PCS | Mod: 26,,, | Performed by: STUDENT IN AN ORGANIZED HEALTH CARE EDUCATION/TRAINING PROGRAM

## 2023-09-13 PROCEDURE — 77067 SCR MAMMO BI INCL CAD: CPT | Mod: TC

## 2023-09-13 PROCEDURE — 77067 SCR MAMMO BI INCL CAD: CPT | Mod: 26,,, | Performed by: STUDENT IN AN ORGANIZED HEALTH CARE EDUCATION/TRAINING PROGRAM

## 2023-09-13 PROCEDURE — 77063 MAMMO DIGITAL SCREENING BILAT WITH TOMO: ICD-10-PCS | Mod: 26,,, | Performed by: STUDENT IN AN ORGANIZED HEALTH CARE EDUCATION/TRAINING PROGRAM

## 2023-09-13 PROCEDURE — 77063 BREAST TOMOSYNTHESIS BI: CPT | Mod: 26,,, | Performed by: STUDENT IN AN ORGANIZED HEALTH CARE EDUCATION/TRAINING PROGRAM

## 2023-12-05 ENCOUNTER — LAB REQUISITION (OUTPATIENT)
Dept: LAB | Facility: HOSPITAL | Age: 73
End: 2023-12-05
Payer: MEDICARE

## 2023-12-05 DIAGNOSIS — E11.8 TYPE 2 DIABETES MELLITUS WITH UNSPECIFIED COMPLICATIONS: ICD-10-CM

## 2023-12-05 DIAGNOSIS — E03.9 HYPOTHYROIDISM, UNSPECIFIED: ICD-10-CM

## 2023-12-05 DIAGNOSIS — R79.9 ABNORMAL FINDING OF BLOOD CHEMISTRY, UNSPECIFIED: ICD-10-CM

## 2023-12-05 LAB
ALBUMIN SERPL-MCNC: 3.5 G/DL (ref 3.4–4.8)
ALBUMIN/GLOB SERPL: 1 RATIO (ref 1.1–2)
ALP SERPL-CCNC: 73 UNIT/L (ref 40–150)
ALT SERPL-CCNC: 18 UNIT/L (ref 0–55)
AST SERPL-CCNC: 16 UNIT/L (ref 5–34)
BASOPHILS # BLD AUTO: 0.05 X10(3)/MCL
BASOPHILS NFR BLD AUTO: 0.4 %
BILIRUB SERPL-MCNC: 0.5 MG/DL
BUN SERPL-MCNC: 18.9 MG/DL (ref 9.8–20.1)
CALCIUM SERPL-MCNC: 10 MG/DL (ref 8.4–10.2)
CHLORIDE SERPL-SCNC: 95 MMOL/L (ref 98–107)
CHOLEST SERPL-MCNC: 165 MG/DL
CHOLEST/HDLC SERPL: 3 {RATIO} (ref 0–5)
CO2 SERPL-SCNC: 27 MMOL/L (ref 23–31)
CREAT SERPL-MCNC: 0.86 MG/DL (ref 0.55–1.02)
DEPRECATED CALCIDIOL+CALCIFEROL SERPL-MC: 52.2 NG/ML (ref 30–80)
EOSINOPHIL # BLD AUTO: 0.39 X10(3)/MCL (ref 0–0.9)
EOSINOPHIL NFR BLD AUTO: 3.3 %
ERYTHROCYTE [DISTWIDTH] IN BLOOD BY AUTOMATED COUNT: 15.2 % (ref 11.5–17)
EST. AVERAGE GLUCOSE BLD GHB EST-MCNC: 131.2 MG/DL
GFR SERPLBLD CREATININE-BSD FMLA CKD-EPI: >60 MLS/MIN/1.73/M2
GLOBULIN SER-MCNC: 3.6 GM/DL (ref 2.4–3.5)
GLUCOSE SERPL-MCNC: 124 MG/DL (ref 82–115)
HBA1C MFR BLD: 6.2 %
HCT VFR BLD AUTO: 38.3 % (ref 37–47)
HDLC SERPL-MCNC: 61 MG/DL (ref 35–60)
HGB BLD-MCNC: 12.5 G/DL (ref 12–16)
IMM GRANULOCYTES # BLD AUTO: 0.06 X10(3)/MCL (ref 0–0.04)
IMM GRANULOCYTES NFR BLD AUTO: 0.5 %
LDLC SERPL CALC-MCNC: 85 MG/DL (ref 50–140)
LYMPHOCYTES # BLD AUTO: 2 X10(3)/MCL (ref 0.6–4.6)
LYMPHOCYTES NFR BLD AUTO: 16.8 %
MCH RBC QN AUTO: 27.8 PG (ref 27–31)
MCHC RBC AUTO-ENTMCNC: 32.6 G/DL (ref 33–36)
MCV RBC AUTO: 85.1 FL (ref 80–94)
MONOCYTES # BLD AUTO: 0.94 X10(3)/MCL (ref 0.1–1.3)
MONOCYTES NFR BLD AUTO: 7.9 %
NEUTROPHILS # BLD AUTO: 8.5 X10(3)/MCL (ref 2.1–9.2)
NEUTROPHILS NFR BLD AUTO: 71.1 %
NRBC BLD AUTO-RTO: 0 %
PLATELET # BLD AUTO: 333 X10(3)/MCL (ref 130–400)
PMV BLD AUTO: 10.1 FL (ref 7.4–10.4)
POTASSIUM SERPL-SCNC: 3.6 MMOL/L (ref 3.5–5.1)
PREALB SERPL-MCNC: 23.1 MG/DL (ref 14–37)
PROT SERPL-MCNC: 7.1 GM/DL (ref 5.8–7.6)
RBC # BLD AUTO: 4.5 X10(6)/MCL (ref 4.2–5.4)
SODIUM SERPL-SCNC: 138 MMOL/L (ref 136–145)
T4 FREE SERPL-MCNC: 1.68 NG/DL (ref 0.7–1.48)
TRIGL SERPL-MCNC: 93 MG/DL (ref 37–140)
TSH SERPL-ACNC: 1.87 UIU/ML (ref 0.35–4.94)
VLDLC SERPL CALC-MCNC: 19 MG/DL
WBC # SPEC AUTO: 11.94 X10(3)/MCL (ref 4.5–11.5)

## 2023-12-05 PROCEDURE — 82306 VITAMIN D 25 HYDROXY: CPT | Performed by: NURSE PRACTITIONER

## 2023-12-05 PROCEDURE — 84134 ASSAY OF PREALBUMIN: CPT | Performed by: NURSE PRACTITIONER

## 2023-12-05 PROCEDURE — 83036 HEMOGLOBIN GLYCOSYLATED A1C: CPT | Performed by: NURSE PRACTITIONER

## 2023-12-05 PROCEDURE — 84443 ASSAY THYROID STIM HORMONE: CPT | Performed by: NURSE PRACTITIONER

## 2023-12-05 PROCEDURE — 80061 LIPID PANEL: CPT | Performed by: NURSE PRACTITIONER

## 2023-12-05 PROCEDURE — 80053 COMPREHEN METABOLIC PANEL: CPT | Performed by: NURSE PRACTITIONER

## 2023-12-05 PROCEDURE — 85025 COMPLETE CBC W/AUTO DIFF WBC: CPT | Performed by: NURSE PRACTITIONER

## 2023-12-05 PROCEDURE — 84439 ASSAY OF FREE THYROXINE: CPT | Performed by: NURSE PRACTITIONER

## 2023-12-19 ENCOUNTER — LAB REQUISITION (OUTPATIENT)
Dept: LAB | Facility: HOSPITAL | Age: 73
End: 2023-12-19

## 2023-12-19 DIAGNOSIS — E03.9 HYPOTHYROIDISM, UNSPECIFIED: ICD-10-CM

## 2023-12-19 DIAGNOSIS — R79.9 ABNORMAL FINDING OF BLOOD CHEMISTRY, UNSPECIFIED: ICD-10-CM

## 2023-12-19 DIAGNOSIS — E11.8 TYPE 2 DIABETES MELLITUS WITH UNSPECIFIED COMPLICATIONS: ICD-10-CM

## 2023-12-19 LAB
ALBUMIN SERPL-MCNC: 3.3 G/DL (ref 3.4–4.8)
ALBUMIN/GLOB SERPL: 1.1 RATIO (ref 1.1–2)
ALP SERPL-CCNC: 65 UNIT/L (ref 40–150)
ALT SERPL-CCNC: 20 UNIT/L (ref 0–55)
AST SERPL-CCNC: 13 UNIT/L (ref 5–34)
BASOPHILS # BLD AUTO: 0.05 X10(3)/MCL
BASOPHILS NFR BLD AUTO: 0.3 %
BILIRUB SERPL-MCNC: 0.3 MG/DL
BUN SERPL-MCNC: 18.9 MG/DL (ref 9.8–20.1)
CALCIUM SERPL-MCNC: 9.3 MG/DL (ref 8.4–10.2)
CHLORIDE SERPL-SCNC: 90 MMOL/L (ref 98–107)
CHOLEST SERPL-MCNC: 165 MG/DL
CHOLEST/HDLC SERPL: 2 {RATIO} (ref 0–5)
CO2 SERPL-SCNC: 36 MMOL/L (ref 23–31)
CREAT SERPL-MCNC: 0.81 MG/DL (ref 0.55–1.02)
DEPRECATED CALCIDIOL+CALCIFEROL SERPL-MC: 39.2 NG/ML (ref 30–80)
EOSINOPHIL # BLD AUTO: 0.14 X10(3)/MCL (ref 0–0.9)
EOSINOPHIL NFR BLD AUTO: 0.9 %
ERYTHROCYTE [DISTWIDTH] IN BLOOD BY AUTOMATED COUNT: 15 % (ref 11.5–17)
EST. AVERAGE GLUCOSE BLD GHB EST-MCNC: 139.9 MG/DL
GFR SERPLBLD CREATININE-BSD FMLA CKD-EPI: >60 MLS/MIN/1.73/M2
GLOBULIN SER-MCNC: 2.9 GM/DL (ref 2.4–3.5)
GLUCOSE SERPL-MCNC: 181 MG/DL (ref 82–115)
HBA1C MFR BLD: 6.5 %
HCT VFR BLD AUTO: 38.5 % (ref 37–47)
HDLC SERPL-MCNC: 76 MG/DL (ref 35–60)
HGB BLD-MCNC: 12.2 G/DL (ref 12–16)
IMM GRANULOCYTES # BLD AUTO: 0.2 X10(3)/MCL (ref 0–0.04)
IMM GRANULOCYTES NFR BLD AUTO: 1.3 %
LDLC SERPL CALC-MCNC: 79 MG/DL (ref 50–140)
LYMPHOCYTES # BLD AUTO: 2.02 X10(3)/MCL (ref 0.6–4.6)
LYMPHOCYTES NFR BLD AUTO: 12.8 %
MCH RBC QN AUTO: 27.6 PG (ref 27–31)
MCHC RBC AUTO-ENTMCNC: 31.7 G/DL (ref 33–36)
MCV RBC AUTO: 87.1 FL (ref 80–94)
MONOCYTES # BLD AUTO: 1.08 X10(3)/MCL (ref 0.1–1.3)
MONOCYTES NFR BLD AUTO: 6.9 %
NEUTROPHILS # BLD AUTO: 12.26 X10(3)/MCL (ref 2.1–9.2)
NEUTROPHILS NFR BLD AUTO: 77.8 %
NRBC BLD AUTO-RTO: 0 %
PLATELET # BLD AUTO: 469 X10(3)/MCL (ref 130–400)
PMV BLD AUTO: 9.4 FL (ref 7.4–10.4)
POTASSIUM SERPL-SCNC: 4.3 MMOL/L (ref 3.5–5.1)
PREALB SERPL-MCNC: 22 MG/DL (ref 14–37)
PROT SERPL-MCNC: 6.2 GM/DL (ref 5.8–7.6)
RBC # BLD AUTO: 4.42 X10(6)/MCL (ref 4.2–5.4)
SODIUM SERPL-SCNC: 136 MMOL/L (ref 136–145)
T4 FREE SERPL-MCNC: 1.33 NG/DL (ref 0.7–1.48)
TRIGL SERPL-MCNC: 49 MG/DL (ref 37–140)
TSH SERPL-ACNC: 0.68 UIU/ML (ref 0.35–4.94)
VLDLC SERPL CALC-MCNC: 10 MG/DL
WBC # SPEC AUTO: 15.75 X10(3)/MCL (ref 4.5–11.5)

## 2023-12-19 PROCEDURE — 84134 ASSAY OF PREALBUMIN: CPT | Performed by: NURSE PRACTITIONER

## 2023-12-19 PROCEDURE — 82306 VITAMIN D 25 HYDROXY: CPT | Performed by: NURSE PRACTITIONER

## 2023-12-19 PROCEDURE — 80053 COMPREHEN METABOLIC PANEL: CPT | Performed by: NURSE PRACTITIONER

## 2023-12-19 PROCEDURE — 85025 COMPLETE CBC W/AUTO DIFF WBC: CPT | Performed by: NURSE PRACTITIONER

## 2023-12-19 PROCEDURE — 84439 ASSAY OF FREE THYROXINE: CPT | Performed by: NURSE PRACTITIONER

## 2023-12-19 PROCEDURE — 84443 ASSAY THYROID STIM HORMONE: CPT | Performed by: NURSE PRACTITIONER

## 2023-12-19 PROCEDURE — 80061 LIPID PANEL: CPT | Performed by: NURSE PRACTITIONER

## 2023-12-19 PROCEDURE — 83036 HEMOGLOBIN GLYCOSYLATED A1C: CPT | Performed by: NURSE PRACTITIONER

## 2023-12-23 ENCOUNTER — LAB REQUISITION (OUTPATIENT)
Dept: LAB | Facility: HOSPITAL | Age: 73
End: 2023-12-23

## 2023-12-23 DIAGNOSIS — R79.9 ABNORMAL FINDING OF BLOOD CHEMISTRY, UNSPECIFIED: ICD-10-CM

## 2023-12-23 DIAGNOSIS — R41.82 ALTERED MENTAL STATUS, UNSPECIFIED: ICD-10-CM

## 2023-12-23 LAB
ANION GAP SERPL CALC-SCNC: 9 MEQ/L
APPEARANCE UR: CLEAR
BASOPHILS # BLD AUTO: 0.07 X10(3)/MCL
BASOPHILS NFR BLD AUTO: 0.4 %
BILIRUB UR QL STRIP.AUTO: NEGATIVE
BUN SERPL-MCNC: 18.1 MG/DL (ref 9.8–20.1)
CALCIUM SERPL-MCNC: 8.9 MG/DL (ref 8.4–10.2)
CHLORIDE SERPL-SCNC: 90 MMOL/L (ref 98–107)
CO2 SERPL-SCNC: 37 MMOL/L (ref 23–31)
COLOR UR AUTO: ABNORMAL
CREAT SERPL-MCNC: 0.77 MG/DL (ref 0.55–1.02)
CREAT/UREA NIT SERPL: 24
EOSINOPHIL # BLD AUTO: 0.28 X10(3)/MCL (ref 0–0.9)
EOSINOPHIL NFR BLD AUTO: 1.5 %
ERYTHROCYTE [DISTWIDTH] IN BLOOD BY AUTOMATED COUNT: 15.3 % (ref 11.5–17)
GFR SERPLBLD CREATININE-BSD FMLA CKD-EPI: >60 MLS/MIN/1.73/M2
GLUCOSE SERPL-MCNC: 122 MG/DL (ref 82–115)
GLUCOSE UR QL STRIP.AUTO: 500
HCT VFR BLD AUTO: 35.2 % (ref 37–47)
HGB BLD-MCNC: 10.9 G/DL (ref 12–16)
IMM GRANULOCYTES # BLD AUTO: 0.42 X10(3)/MCL (ref 0–0.04)
IMM GRANULOCYTES NFR BLD AUTO: 2.2 %
KETONES UR QL STRIP.AUTO: NEGATIVE
LEUKOCYTE ESTERASE UR QL STRIP.AUTO: NEGATIVE
LYMPHOCYTES # BLD AUTO: 2.9 X10(3)/MCL (ref 0.6–4.6)
LYMPHOCYTES NFR BLD AUTO: 15.4 %
MCH RBC QN AUTO: 27.7 PG (ref 27–31)
MCHC RBC AUTO-ENTMCNC: 31 G/DL (ref 33–36)
MCV RBC AUTO: 89.3 FL (ref 80–94)
MONOCYTES # BLD AUTO: 1.27 X10(3)/MCL (ref 0.1–1.3)
MONOCYTES NFR BLD AUTO: 6.8 %
NEUTROPHILS # BLD AUTO: 13.84 X10(3)/MCL (ref 2.1–9.2)
NEUTROPHILS NFR BLD AUTO: 73.7 %
NITRITE UR QL STRIP.AUTO: NEGATIVE
NRBC BLD AUTO-RTO: 0 %
PH UR STRIP.AUTO: 6 [PH]
PLATELET # BLD AUTO: 432 X10(3)/MCL (ref 130–400)
PMV BLD AUTO: 9.3 FL (ref 7.4–10.4)
POTASSIUM SERPL-SCNC: 5 MMOL/L (ref 3.5–5.1)
PROT UR QL STRIP.AUTO: NEGATIVE
RBC # BLD AUTO: 3.94 X10(6)/MCL (ref 4.2–5.4)
RBC UR QL AUTO: NEGATIVE
SODIUM SERPL-SCNC: 136 MMOL/L (ref 136–145)
SP GR UR STRIP.AUTO: 1.01 (ref 1–1.03)
UROBILINOGEN UR STRIP-ACNC: 0.2
WBC # SPEC AUTO: 18.78 X10(3)/MCL (ref 4.5–11.5)

## 2023-12-23 PROCEDURE — 87077 CULTURE AEROBIC IDENTIFY: CPT | Performed by: NURSE PRACTITIONER

## 2023-12-23 PROCEDURE — 81003 URINALYSIS AUTO W/O SCOPE: CPT | Performed by: NURSE PRACTITIONER

## 2023-12-23 PROCEDURE — 87086 URINE CULTURE/COLONY COUNT: CPT | Performed by: NURSE PRACTITIONER

## 2023-12-23 PROCEDURE — 80048 BASIC METABOLIC PNL TOTAL CA: CPT | Performed by: NURSE PRACTITIONER

## 2023-12-23 PROCEDURE — 85025 COMPLETE CBC W/AUTO DIFF WBC: CPT | Performed by: NURSE PRACTITIONER

## 2023-12-25 ENCOUNTER — LAB REQUISITION (OUTPATIENT)
Dept: LAB | Facility: HOSPITAL | Age: 73
End: 2023-12-25

## 2023-12-25 DIAGNOSIS — R79.9 ABNORMAL FINDING OF BLOOD CHEMISTRY, UNSPECIFIED: ICD-10-CM

## 2023-12-25 LAB
ANION GAP SERPL CALC-SCNC: 9 MEQ/L
BACTERIA UR CULT: ABNORMAL
BASOPHILS # BLD AUTO: 0.06 X10(3)/MCL
BASOPHILS NFR BLD AUTO: 0.3 %
BUN SERPL-MCNC: 11.3 MG/DL (ref 9.8–20.1)
CALCIUM SERPL-MCNC: 8.9 MG/DL (ref 8.4–10.2)
CHLORIDE SERPL-SCNC: 88 MMOL/L (ref 98–107)
CO2 SERPL-SCNC: 35 MMOL/L (ref 23–31)
CREAT SERPL-MCNC: 0.71 MG/DL (ref 0.55–1.02)
CREAT/UREA NIT SERPL: 16
EOSINOPHIL # BLD AUTO: 0.37 X10(3)/MCL (ref 0–0.9)
EOSINOPHIL NFR BLD AUTO: 1.9 %
ERYTHROCYTE [DISTWIDTH] IN BLOOD BY AUTOMATED COUNT: 15.4 % (ref 11.5–17)
GFR SERPLBLD CREATININE-BSD FMLA CKD-EPI: >60 MLS/MIN/1.73/M2
GLUCOSE SERPL-MCNC: 147 MG/DL (ref 82–115)
HCT VFR BLD AUTO: 36.2 % (ref 37–47)
HGB BLD-MCNC: 11.1 G/DL (ref 12–16)
IMM GRANULOCYTES # BLD AUTO: 0.32 X10(3)/MCL (ref 0–0.04)
IMM GRANULOCYTES NFR BLD AUTO: 1.6 %
LYMPHOCYTES # BLD AUTO: 1.94 X10(3)/MCL (ref 0.6–4.6)
LYMPHOCYTES NFR BLD AUTO: 9.8 %
MCH RBC QN AUTO: 26.9 PG (ref 27–31)
MCHC RBC AUTO-ENTMCNC: 30.7 G/DL (ref 33–36)
MCV RBC AUTO: 87.7 FL (ref 80–94)
MONOCYTES # BLD AUTO: 1.17 X10(3)/MCL (ref 0.1–1.3)
MONOCYTES NFR BLD AUTO: 5.9 %
NEUTROPHILS # BLD AUTO: 15.86 X10(3)/MCL (ref 2.1–9.2)
NEUTROPHILS NFR BLD AUTO: 80.5 %
NRBC BLD AUTO-RTO: 0 %
PLATELET # BLD AUTO: 361 X10(3)/MCL (ref 130–400)
PMV BLD AUTO: 9 FL (ref 7.4–10.4)
POTASSIUM SERPL-SCNC: 4.7 MMOL/L (ref 3.5–5.1)
RBC # BLD AUTO: 4.13 X10(6)/MCL (ref 4.2–5.4)
SODIUM SERPL-SCNC: 132 MMOL/L (ref 136–145)
WBC # SPEC AUTO: 19.72 X10(3)/MCL (ref 4.5–11.5)

## 2023-12-25 PROCEDURE — 80048 BASIC METABOLIC PNL TOTAL CA: CPT | Performed by: NURSE PRACTITIONER

## 2023-12-25 PROCEDURE — 85025 COMPLETE CBC W/AUTO DIFF WBC: CPT | Performed by: NURSE PRACTITIONER

## 2024-07-31 ENCOUNTER — LAB VISIT (OUTPATIENT)
Dept: LAB | Facility: HOSPITAL | Age: 74
End: 2024-07-31
Attending: NURSE PRACTITIONER
Payer: MEDICARE

## 2024-07-31 DIAGNOSIS — F32.9 MAJOR DEPRESSIVE DISORDER, SINGLE EPISODE, UNSPECIFIED: ICD-10-CM

## 2024-07-31 DIAGNOSIS — F90.0 ADHD, PREDOMINANTLY INATTENTIVE TYPE: ICD-10-CM

## 2024-07-31 DIAGNOSIS — E11.9 DIABETES MELLITUS WITHOUT COMPLICATION: ICD-10-CM

## 2024-07-31 DIAGNOSIS — R29.6 FALLS FREQUENTLY: ICD-10-CM

## 2024-07-31 DIAGNOSIS — J30.9 SPASMODIC RHINORRHEA: Primary | ICD-10-CM

## 2024-07-31 DIAGNOSIS — G25.9 MOVEMENT DISORDER: ICD-10-CM

## 2024-07-31 DIAGNOSIS — F41.9 ANXIETY DISORDER OF CHILDHOOD OR ADOLESCENCE: ICD-10-CM

## 2024-07-31 DIAGNOSIS — M51.9 INTERVERTEBRAL DISC DISORDER: ICD-10-CM

## 2024-07-31 DIAGNOSIS — K57.90 DIVERTICULOSIS: ICD-10-CM

## 2024-07-31 DIAGNOSIS — R60.9 EDEMA, UNSPECIFIED TYPE: ICD-10-CM

## 2024-07-31 DIAGNOSIS — J44.9 VANISHING LUNG: ICD-10-CM

## 2024-07-31 LAB
ALBUMIN SERPL-MCNC: 3.5 G/DL (ref 3.4–4.8)
ALBUMIN/GLOB SERPL: 1 RATIO (ref 1.1–2)
ALP SERPL-CCNC: 73 UNIT/L (ref 40–150)
ALT SERPL-CCNC: 15 UNIT/L (ref 0–55)
ANION GAP SERPL CALC-SCNC: 9 MEQ/L
AST SERPL-CCNC: 12 UNIT/L (ref 5–34)
BACTERIA #/AREA URNS AUTO: NORMAL /HPF
BILIRUB SERPL-MCNC: 0.4 MG/DL
BILIRUB UR QL STRIP.AUTO: NEGATIVE
BUN SERPL-MCNC: 20.5 MG/DL (ref 9.8–20.1)
CALCIUM SERPL-MCNC: 9 MG/DL (ref 8.4–10.2)
CHLORIDE SERPL-SCNC: 97 MMOL/L (ref 98–107)
CHOLEST SERPL-MCNC: 184 MG/DL
CHOLEST/HDLC SERPL: 3 {RATIO} (ref 0–5)
CLARITY UR: CLEAR
CO2 SERPL-SCNC: 32 MMOL/L (ref 23–31)
COLOR UR AUTO: YELLOW
CREAT SERPL-MCNC: 1.02 MG/DL (ref 0.55–1.02)
CREAT/UREA NIT SERPL: 20
ERYTHROCYTE [DISTWIDTH] IN BLOOD BY AUTOMATED COUNT: 13.9 % (ref 11.5–17)
EST. AVERAGE GLUCOSE BLD GHB EST-MCNC: 139.9 MG/DL
GFR SERPLBLD CREATININE-BSD FMLA CKD-EPI: 59 ML/MIN/1.73/M2
GLOBULIN SER-MCNC: 3.4 GM/DL (ref 2.4–3.5)
GLUCOSE SERPL-MCNC: 109 MG/DL (ref 82–115)
GLUCOSE UR QL STRIP: 100
HBA1C MFR BLD: 6.5 %
HCT VFR BLD AUTO: 41.2 % (ref 37–47)
HDLC SERPL-MCNC: 63 MG/DL (ref 35–60)
HGB BLD-MCNC: 13.1 G/DL (ref 12–16)
HGB UR QL STRIP: ABNORMAL
KETONES UR QL STRIP: NEGATIVE
LDLC SERPL CALC-MCNC: 100 MG/DL (ref 50–140)
LEUKOCYTE ESTERASE UR QL STRIP: ABNORMAL
MCH RBC QN AUTO: 27.6 PG (ref 27–31)
MCHC RBC AUTO-ENTMCNC: 31.8 G/DL (ref 33–36)
MCV RBC AUTO: 86.7 FL (ref 80–94)
NITRITE UR QL STRIP: NEGATIVE
PH UR STRIP: 5.5 [PH]
PLATELET # BLD AUTO: 288 X10(3)/MCL (ref 130–400)
PMV BLD AUTO: 8.7 FL (ref 7.4–10.4)
POTASSIUM SERPL-SCNC: 4.4 MMOL/L (ref 3.5–5.1)
PROT SERPL-MCNC: 6.9 GM/DL (ref 5.8–7.6)
PROT UR QL STRIP: NEGATIVE
RBC # BLD AUTO: 4.75 X10(6)/MCL (ref 4.2–5.4)
RBC #/AREA URNS AUTO: NORMAL /HPF
SODIUM SERPL-SCNC: 138 MMOL/L (ref 136–145)
SP GR UR STRIP.AUTO: 1.02 (ref 1–1.03)
SQUAMOUS #/AREA URNS AUTO: NORMAL /HPF
TRIGL SERPL-MCNC: 107 MG/DL (ref 37–140)
TSH SERPL-ACNC: 0.84 UIU/ML (ref 0.35–4.94)
UROBILINOGEN UR STRIP-ACNC: 0.2
VLDLC SERPL CALC-MCNC: 21 MG/DL
WBC # BLD AUTO: 14.57 X10(3)/MCL (ref 4.5–11.5)
WBC #/AREA URNS AUTO: NORMAL /HPF

## 2024-07-31 PROCEDURE — 85027 COMPLETE CBC AUTOMATED: CPT

## 2024-07-31 PROCEDURE — 36415 COLL VENOUS BLD VENIPUNCTURE: CPT

## 2024-07-31 PROCEDURE — 80061 LIPID PANEL: CPT

## 2024-07-31 PROCEDURE — 84443 ASSAY THYROID STIM HORMONE: CPT

## 2024-07-31 PROCEDURE — 83036 HEMOGLOBIN GLYCOSYLATED A1C: CPT

## 2024-07-31 PROCEDURE — 81003 URINALYSIS AUTO W/O SCOPE: CPT

## 2024-07-31 PROCEDURE — 81001 URINALYSIS AUTO W/SCOPE: CPT

## 2024-07-31 PROCEDURE — 80053 COMPREHEN METABOLIC PANEL: CPT

## 2024-10-29 DIAGNOSIS — Z78.0 MENOPAUSE PRESENT: Primary | ICD-10-CM

## 2024-12-18 ENCOUNTER — HOSPITAL ENCOUNTER (INPATIENT)
Facility: HOSPITAL | Age: 74
LOS: 2 days | Discharge: HOME | DRG: 189 | End: 2024-12-21
Attending: STUDENT IN AN ORGANIZED HEALTH CARE EDUCATION/TRAINING PROGRAM | Admitting: STUDENT IN AN ORGANIZED HEALTH CARE EDUCATION/TRAINING PROGRAM
Payer: MEDICARE

## 2024-12-18 DIAGNOSIS — R41.82 AMS (ALTERED MENTAL STATUS): ICD-10-CM

## 2024-12-18 DIAGNOSIS — R06.89 HYPERCAPNIA: Primary | ICD-10-CM

## 2024-12-18 DIAGNOSIS — R07.9 CHEST PAIN: ICD-10-CM

## 2024-12-18 LAB
ALBUMIN SERPL-MCNC: 3.7 G/DL (ref 3.4–4.8)
ALBUMIN/GLOB SERPL: 1.3 RATIO (ref 1.1–2)
ALP SERPL-CCNC: 66 UNIT/L (ref 40–150)
ALT SERPL-CCNC: 14 UNIT/L (ref 0–55)
AMMONIA PLAS-MSCNC: 27.2 UMOL/L (ref 18–72)
ANION GAP SERPL CALC-SCNC: 9 MEQ/L
APTT PPP: 23.2 SECONDS (ref 23.2–33.7)
AST SERPL-CCNC: 17 UNIT/L (ref 5–34)
BASOPHILS # BLD AUTO: 0.04 X10(3)/MCL
BASOPHILS NFR BLD AUTO: 0.3 %
BILIRUB SERPL-MCNC: 0.3 MG/DL
BUN SERPL-MCNC: 28.1 MG/DL (ref 9.8–20.1)
CALCIUM SERPL-MCNC: 9.5 MG/DL (ref 8.4–10.2)
CHLORIDE SERPL-SCNC: 102 MMOL/L (ref 98–107)
CO2 SERPL-SCNC: 30 MMOL/L (ref 23–31)
CREAT SERPL-MCNC: 1.16 MG/DL (ref 0.55–1.02)
CREAT/UREA NIT SERPL: 24
EOSINOPHIL # BLD AUTO: 0.17 X10(3)/MCL (ref 0–0.9)
EOSINOPHIL NFR BLD AUTO: 1.2 %
ERYTHROCYTE [DISTWIDTH] IN BLOOD BY AUTOMATED COUNT: 14.6 % (ref 11.5–17)
ETHANOL SERPL-MCNC: <10 MG/DL
GFR SERPLBLD CREATININE-BSD FMLA CKD-EPI: 50 ML/MIN/1.73/M2
GLOBULIN SER-MCNC: 2.9 GM/DL (ref 2.4–3.5)
GLUCOSE SERPL-MCNC: 111 MG/DL (ref 82–115)
HCT VFR BLD AUTO: 38 % (ref 37–47)
HGB BLD-MCNC: 12 G/DL (ref 12–16)
IMM GRANULOCYTES # BLD AUTO: 0.09 X10(3)/MCL (ref 0–0.04)
IMM GRANULOCYTES NFR BLD AUTO: 0.6 %
INR PPP: 0.9
LYMPHOCYTES # BLD AUTO: 1.68 X10(3)/MCL (ref 0.6–4.6)
LYMPHOCYTES NFR BLD AUTO: 11.5 %
MCH RBC QN AUTO: 27.5 PG (ref 27–31)
MCHC RBC AUTO-ENTMCNC: 31.6 G/DL (ref 33–36)
MCV RBC AUTO: 87 FL (ref 80–94)
MONOCYTES # BLD AUTO: 0.77 X10(3)/MCL (ref 0.1–1.3)
MONOCYTES NFR BLD AUTO: 5.3 %
NEUTROPHILS # BLD AUTO: 11.88 X10(3)/MCL (ref 2.1–9.2)
NEUTROPHILS NFR BLD AUTO: 81.1 %
NRBC BLD AUTO-RTO: 0 %
PLATELET # BLD AUTO: 319 X10(3)/MCL (ref 130–400)
PMV BLD AUTO: 9.6 FL (ref 7.4–10.4)
POCT GLUCOSE: 147 MG/DL (ref 70–110)
POTASSIUM SERPL-SCNC: 4.7 MMOL/L (ref 3.5–5.1)
PROT SERPL-MCNC: 6.6 GM/DL (ref 5.8–7.6)
PROTHROMBIN TIME: 12 SECONDS (ref 12.5–14.5)
RBC # BLD AUTO: 4.37 X10(6)/MCL (ref 4.2–5.4)
SODIUM SERPL-SCNC: 141 MMOL/L (ref 136–145)
WBC # BLD AUTO: 14.63 X10(3)/MCL (ref 4.5–11.5)

## 2024-12-18 PROCEDURE — 85610 PROTHROMBIN TIME: CPT | Performed by: STUDENT IN AN ORGANIZED HEALTH CARE EDUCATION/TRAINING PROGRAM

## 2024-12-18 PROCEDURE — 82962 GLUCOSE BLOOD TEST: CPT

## 2024-12-18 PROCEDURE — 85730 THROMBOPLASTIN TIME PARTIAL: CPT | Performed by: STUDENT IN AN ORGANIZED HEALTH CARE EDUCATION/TRAINING PROGRAM

## 2024-12-18 PROCEDURE — 99291 CRITICAL CARE FIRST HOUR: CPT

## 2024-12-18 PROCEDURE — 80053 COMPREHEN METABOLIC PANEL: CPT | Performed by: STUDENT IN AN ORGANIZED HEALTH CARE EDUCATION/TRAINING PROGRAM

## 2024-12-18 PROCEDURE — 82140 ASSAY OF AMMONIA: CPT | Performed by: STUDENT IN AN ORGANIZED HEALTH CARE EDUCATION/TRAINING PROGRAM

## 2024-12-18 PROCEDURE — 85025 COMPLETE CBC W/AUTO DIFF WBC: CPT | Performed by: STUDENT IN AN ORGANIZED HEALTH CARE EDUCATION/TRAINING PROGRAM

## 2024-12-18 PROCEDURE — 82077 ASSAY SPEC XCP UR&BREATH IA: CPT | Performed by: STUDENT IN AN ORGANIZED HEALTH CARE EDUCATION/TRAINING PROGRAM

## 2024-12-18 NOTE — Clinical Note
Diagnosis: Hypercapnia [153693]   Future Attending Provider: REYES, THAIRY G [[id number]]   Admit to which facility:: [facility] [[id number]]   Reason for IP Medical Treatment  (Clinical interventions that can only be accomplished in the IP setting? ) :: BiPAP, hypercapnic respiratory failure, altered mental status   Plans for Post-Acute care--if anticipated (pick the single best option):: A. No post acute care anticipated at this time

## 2024-12-19 LAB
ALBUMIN SERPL-MCNC: 4 G/DL (ref 3.4–4.8)
ALBUMIN/GLOB SERPL: 1.1 RATIO (ref 1.1–2)
ALLENS TEST BLOOD GAS (OHS): ABNORMAL
ALLENS TEST BLOOD GAS (OHS): YES
ALLENS TEST BLOOD GAS (OHS): YES
ALP SERPL-CCNC: 75 UNIT/L (ref 40–150)
ALT SERPL-CCNC: 15 UNIT/L (ref 0–55)
AMPHET UR QL SCN: NEGATIVE
ANION GAP SERPL CALC-SCNC: 8 MEQ/L
AST SERPL-CCNC: 18 UNIT/L (ref 5–34)
BACTERIA #/AREA URNS AUTO: ABNORMAL /HPF
BARBITURATE SCN PRESENT UR: NEGATIVE
BASE EXCESS BLD CALC-SCNC: 4.6 MMOL/L (ref -2–2)
BASE EXCESS BLD CALC-SCNC: 4.6 MMOL/L (ref -2–2)
BASE EXCESS BLD CALC-SCNC: 5.9 MMOL/L (ref -2–2)
BASOPHILS # BLD AUTO: 0.09 X10(3)/MCL
BASOPHILS NFR BLD AUTO: 0.6 %
BENZODIAZ UR QL SCN: NEGATIVE
BILIRUB SERPL-MCNC: 0.5 MG/DL
BILIRUB UR QL STRIP.AUTO: NEGATIVE
BIPAP(E) BLOOD GAS (OHS): 6 CM H2O
BIPAP(E) BLOOD GAS (OHS): 6 CM H2O
BIPAP(I) BLOOD GAS (OHS): 14 CM H2O
BIPAP(I) BLOOD GAS (OHS): 16 CM H2O
BLOOD GAS SAMPLE TYPE (OHS): ABNORMAL
BUN SERPL-MCNC: 24.1 MG/DL (ref 9.8–20.1)
CA-I BLD-SCNC: 1.28 MMOL/L (ref 1.12–1.23)
CA-I BLD-SCNC: 1.29 MMOL/L (ref 1.12–1.23)
CA-I BLD-SCNC: 1.34 MMOL/L (ref 1.12–1.23)
CALCIUM SERPL-MCNC: 9.6 MG/DL (ref 8.4–10.2)
CANNABINOIDS UR QL SCN: NEGATIVE
CHLORIDE SERPL-SCNC: 104 MMOL/L (ref 98–107)
CLARITY UR: CLEAR
CO2 BLDA-SCNC: 34.2 MMOL/L
CO2 BLDA-SCNC: 34.4 MMOL/L
CO2 BLDA-SCNC: 34.7 MMOL/L
CO2 SERPL-SCNC: 27 MMOL/L (ref 23–31)
COCAINE UR QL SCN: NEGATIVE
COHGB MFR BLDA: 1.6 % (ref 0.5–1.5)
COHGB MFR BLDA: 1.8 % (ref 0.5–1.5)
COHGB MFR BLDA: 2 % (ref 0.5–1.5)
COLOR UR AUTO: ABNORMAL
CREAT SERPL-MCNC: 0.84 MG/DL (ref 0.55–1.02)
CREAT/UREA NIT SERPL: 29
DRAWN BY BLOOD GAS (OHS): ABNORMAL
EOSINOPHIL # BLD AUTO: 0.23 X10(3)/MCL (ref 0–0.9)
EOSINOPHIL NFR BLD AUTO: 1.5 %
ERYTHROCYTE [DISTWIDTH] IN BLOOD BY AUTOMATED COUNT: 14.6 % (ref 11.5–17)
FENTANYL UR QL SCN: POSITIVE
GFR SERPLBLD CREATININE-BSD FMLA CKD-EPI: >60 ML/MIN/1.73/M2
GLOBULIN SER-MCNC: 3.7 GM/DL (ref 2.4–3.5)
GLUCOSE SERPL-MCNC: 96 MG/DL (ref 82–115)
GLUCOSE UR QL STRIP: NORMAL
HCO3 BLDA-SCNC: 32.5 MMOL/L (ref 22–26)
HCO3 BLDA-SCNC: 32.5 MMOL/L (ref 22–26)
HCO3 BLDA-SCNC: 32.7 MMOL/L (ref 22–26)
HCT VFR BLD AUTO: 45.4 % (ref 37–47)
HGB BLD-MCNC: 14 G/DL (ref 12–16)
HGB UR QL STRIP: NEGATIVE
HYALINE CASTS #/AREA URNS LPF: ABNORMAL /LPF
IMM GRANULOCYTES # BLD AUTO: 0.09 X10(3)/MCL (ref 0–0.04)
IMM GRANULOCYTES NFR BLD AUTO: 0.6 %
INHALED O2 CONCENTRATION: 30 %
INHALED O2 CONCENTRATION: 30 %
KETONES UR QL STRIP: NEGATIVE
LEUKOCYTE ESTERASE UR QL STRIP: NEGATIVE
LPM (OHS): 2
LYMPHOCYTES # BLD AUTO: 2.18 X10(3)/MCL (ref 0.6–4.6)
LYMPHOCYTES NFR BLD AUTO: 14.7 %
MAGNESIUM SERPL-MCNC: 1.7 MG/DL (ref 1.6–2.6)
MCH RBC QN AUTO: 27.1 PG (ref 27–31)
MCHC RBC AUTO-ENTMCNC: 30.8 G/DL (ref 33–36)
MCV RBC AUTO: 88 FL (ref 80–94)
MDMA UR QL SCN: NEGATIVE
MECH RR (OHS): 18 B/MIN
METHGB MFR BLDA: 1 % (ref 0.4–1.5)
MODE (OHS): ABNORMAL
MODE (OHS): ABNORMAL
MONOCYTES # BLD AUTO: 0.88 X10(3)/MCL (ref 0.1–1.3)
MONOCYTES NFR BLD AUTO: 5.9 %
NEUTROPHILS # BLD AUTO: 11.39 X10(3)/MCL (ref 2.1–9.2)
NEUTROPHILS NFR BLD AUTO: 76.7 %
NITRITE UR QL STRIP: NEGATIVE
NRBC BLD AUTO-RTO: 0.1 %
O2 HB BLOOD GAS (OHS): 95.3 % (ref 94–97)
O2 HB BLOOD GAS (OHS): 95.7 % (ref 94–97)
O2 HB BLOOD GAS (OHS): 96.2 % (ref 94–97)
OHS QRS DURATION: 116 MS
OHS QTC CALCULATION: 461 MS
OPIATES UR QL SCN: NEGATIVE
OXYGEN DEVICE BLOOD GAS (OHS): ABNORMAL
OXYHGB MFR BLDA: 12.6 G/DL (ref 12–16)
OXYHGB MFR BLDA: 12.7 G/DL (ref 12–16)
OXYHGB MFR BLDA: 12.8 G/DL (ref 12–16)
PCO2 BLDA: 55 MMHG (ref 35–45)
PCO2 BLDA: 63 MMHG (ref 35–45)
PCO2 BLDA: 65 MMHG (ref 35–45)
PCP UR QL: NEGATIVE
PH BLDA: 7.31 [PH] (ref 7.35–7.45)
PH BLDA: 7.32 [PH] (ref 7.35–7.45)
PH BLDA: 7.38 [PH] (ref 7.35–7.45)
PH UR STRIP: 5.5 [PH]
PH UR: 5.5 [PH] (ref 3–11)
PHOSPHATE SERPL-MCNC: 3.9 MG/DL (ref 2.3–4.7)
PLATELET # BLD AUTO: 309 X10(3)/MCL (ref 130–400)
PMV BLD AUTO: 9.9 FL (ref 7.4–10.4)
PO2 BLDA: 112 MMHG (ref 80–100)
PO2 BLDA: 90 MMHG (ref 80–100)
PO2 BLDA: 96 MMHG (ref 80–100)
POCT GLUCOSE: 131 MG/DL (ref 70–110)
POCT GLUCOSE: 148 MG/DL (ref 70–110)
POCT GLUCOSE: 97 MG/DL (ref 70–110)
POTASSIUM BLOOD GAS (OHS): 4.1 MMOL/L (ref 3.5–5)
POTASSIUM BLOOD GAS (OHS): 4.2 MMOL/L (ref 3.5–5)
POTASSIUM BLOOD GAS (OHS): 4.6 MMOL/L (ref 3.5–5)
POTASSIUM SERPL-SCNC: 5 MMOL/L (ref 3.5–5.1)
PROT SERPL-MCNC: 7.7 GM/DL (ref 5.8–7.6)
PROT UR QL STRIP: NEGATIVE
RBC # BLD AUTO: 5.16 X10(6)/MCL (ref 4.2–5.4)
RBC #/AREA URNS AUTO: ABNORMAL /HPF
SAMPLE SITE BLOOD GAS (OHS): ABNORMAL
SAO2 % BLDA: 96.1 %
SAO2 % BLDA: 96.9 %
SAO2 % BLDA: 98.3 %
SODIUM BLOOD GAS (OHS): 135 MMOL/L (ref 137–145)
SODIUM BLOOD GAS (OHS): 137 MMOL/L (ref 137–145)
SODIUM BLOOD GAS (OHS): 138 MMOL/L (ref 137–145)
SODIUM SERPL-SCNC: 139 MMOL/L (ref 136–145)
SP GR UR STRIP.AUTO: 1.01 (ref 1–1.03)
SPECIFIC GRAVITY, URINE AUTO (.000) (OHS): 1.01 (ref 1–1.03)
SQUAMOUS #/AREA URNS LPF: ABNORMAL /HPF
UROBILINOGEN UR STRIP-ACNC: NORMAL
WBC # BLD AUTO: 14.86 X10(3)/MCL (ref 4.5–11.5)
WBC #/AREA URNS AUTO: ABNORMAL /HPF

## 2024-12-19 PROCEDURE — 94760 N-INVAS EAR/PLS OXIMETRY 1: CPT | Mod: XB

## 2024-12-19 PROCEDURE — 94660 CPAP INITIATION&MGMT: CPT

## 2024-12-19 PROCEDURE — 27000190 HC CPAP FULL FACE MASK W/VALVE

## 2024-12-19 PROCEDURE — 94799 UNLISTED PULMONARY SVC/PX: CPT

## 2024-12-19 PROCEDURE — 93010 ELECTROCARDIOGRAM REPORT: CPT | Mod: ,,, | Performed by: INTERNAL MEDICINE

## 2024-12-19 PROCEDURE — 82803 BLOOD GASES ANY COMBINATION: CPT

## 2024-12-19 PROCEDURE — 99900031 HC PATIENT EDUCATION (STAT)

## 2024-12-19 PROCEDURE — 5A09357 ASSISTANCE WITH RESPIRATORY VENTILATION, LESS THAN 24 CONSECUTIVE HOURS, CONTINUOUS POSITIVE AIRWAY PRESSURE: ICD-10-PCS | Performed by: INTERNAL MEDICINE

## 2024-12-19 PROCEDURE — 36600 WITHDRAWAL OF ARTERIAL BLOOD: CPT

## 2024-12-19 PROCEDURE — 99900035 HC TECH TIME PER 15 MIN (STAT)

## 2024-12-19 PROCEDURE — 84100 ASSAY OF PHOSPHORUS: CPT | Performed by: NURSE PRACTITIONER

## 2024-12-19 PROCEDURE — 25000003 PHARM REV CODE 250: Performed by: NURSE PRACTITIONER

## 2024-12-19 PROCEDURE — 81001 URINALYSIS AUTO W/SCOPE: CPT | Performed by: STUDENT IN AN ORGANIZED HEALTH CARE EDUCATION/TRAINING PROGRAM

## 2024-12-19 PROCEDURE — 80053 COMPREHEN METABOLIC PANEL: CPT | Performed by: NURSE PRACTITIONER

## 2024-12-19 PROCEDURE — 85025 COMPLETE CBC W/AUTO DIFF WBC: CPT | Performed by: NURSE PRACTITIONER

## 2024-12-19 PROCEDURE — 80307 DRUG TEST PRSMV CHEM ANLYZR: CPT | Performed by: STUDENT IN AN ORGANIZED HEALTH CARE EDUCATION/TRAINING PROGRAM

## 2024-12-19 PROCEDURE — 93005 ELECTROCARDIOGRAM TRACING: CPT

## 2024-12-19 PROCEDURE — 27100171 HC OXYGEN HIGH FLOW UP TO 24 HOURS

## 2024-12-19 PROCEDURE — 83735 ASSAY OF MAGNESIUM: CPT | Performed by: NURSE PRACTITIONER

## 2024-12-19 PROCEDURE — 25000003 PHARM REV CODE 250: Performed by: INTERNAL MEDICINE

## 2024-12-19 PROCEDURE — 63600175 PHARM REV CODE 636 W HCPCS: Performed by: INTERNAL MEDICINE

## 2024-12-19 PROCEDURE — 94640 AIRWAY INHALATION TREATMENT: CPT

## 2024-12-19 PROCEDURE — 25000242 PHARM REV CODE 250 ALT 637 W/ HCPCS: Performed by: INTERNAL MEDICINE

## 2024-12-19 PROCEDURE — 21400001 HC TELEMETRY ROOM

## 2024-12-19 RX ORDER — MICONAZOLE NITRATE 2 G/100G
POWDER TOPICAL 2 TIMES DAILY
Status: DISCONTINUED | OUTPATIENT
Start: 2024-12-19 | End: 2024-12-20

## 2024-12-19 RX ORDER — IBUPROFEN 200 MG
24 TABLET ORAL
Status: DISCONTINUED | OUTPATIENT
Start: 2024-12-19 | End: 2024-12-21 | Stop reason: HOSPADM

## 2024-12-19 RX ORDER — ONDANSETRON HYDROCHLORIDE 2 MG/ML
4 INJECTION, SOLUTION INTRAVENOUS EVERY 4 HOURS PRN
Status: DISCONTINUED | OUTPATIENT
Start: 2024-12-19 | End: 2024-12-21 | Stop reason: HOSPADM

## 2024-12-19 RX ORDER — GLUCAGON 1 MG
1 KIT INJECTION
Status: DISCONTINUED | OUTPATIENT
Start: 2024-12-19 | End: 2024-12-21 | Stop reason: HOSPADM

## 2024-12-19 RX ORDER — HYDROXYZINE PAMOATE 25 MG/1
25 CAPSULE ORAL ONCE AS NEEDED
Status: COMPLETED | OUTPATIENT
Start: 2024-12-19 | End: 2024-12-19

## 2024-12-19 RX ORDER — IPRATROPIUM BROMIDE AND ALBUTEROL SULFATE 2.5; .5 MG/3ML; MG/3ML
3 SOLUTION RESPIRATORY (INHALATION) EVERY 8 HOURS
Status: DISPENSED | OUTPATIENT
Start: 2024-12-19 | End: 2024-12-21

## 2024-12-19 RX ORDER — POLYETHYLENE GLYCOL 3350 17 G/17G
17 POWDER, FOR SOLUTION ORAL 2 TIMES DAILY PRN
Status: DISCONTINUED | OUTPATIENT
Start: 2024-12-19 | End: 2024-12-21 | Stop reason: HOSPADM

## 2024-12-19 RX ORDER — ALUMINUM HYDROXIDE, MAGNESIUM HYDROXIDE, AND SIMETHICONE 1200; 120; 1200 MG/30ML; MG/30ML; MG/30ML
30 SUSPENSION ORAL 4 TIMES DAILY PRN
Status: DISCONTINUED | OUTPATIENT
Start: 2024-12-19 | End: 2024-12-21 | Stop reason: HOSPADM

## 2024-12-19 RX ORDER — DIPHENHYDRAMINE HYDROCHLORIDE 50 MG/ML
12.5 INJECTION INTRAMUSCULAR; INTRAVENOUS ONCE AS NEEDED
Status: COMPLETED | OUTPATIENT
Start: 2024-12-19 | End: 2024-12-19

## 2024-12-19 RX ORDER — ACETAMINOPHEN 325 MG/1
650 TABLET ORAL EVERY 6 HOURS PRN
Status: DISCONTINUED | OUTPATIENT
Start: 2024-12-19 | End: 2024-12-21 | Stop reason: HOSPADM

## 2024-12-19 RX ORDER — ENOXAPARIN SODIUM 100 MG/ML
40 INJECTION SUBCUTANEOUS EVERY 24 HOURS
Status: DISCONTINUED | OUTPATIENT
Start: 2024-12-19 | End: 2024-12-19

## 2024-12-19 RX ORDER — INSULIN ASPART 100 [IU]/ML
0-10 INJECTION, SOLUTION INTRAVENOUS; SUBCUTANEOUS
Status: DISCONTINUED | OUTPATIENT
Start: 2024-12-19 | End: 2024-12-21 | Stop reason: HOSPADM

## 2024-12-19 RX ORDER — TALC
6 POWDER (GRAM) TOPICAL NIGHTLY PRN
Status: DISCONTINUED | OUTPATIENT
Start: 2024-12-19 | End: 2024-12-21 | Stop reason: HOSPADM

## 2024-12-19 RX ORDER — SIMETHICONE 80 MG
1 TABLET,CHEWABLE ORAL 4 TIMES DAILY PRN
Status: DISCONTINUED | OUTPATIENT
Start: 2024-12-19 | End: 2024-12-21 | Stop reason: HOSPADM

## 2024-12-19 RX ORDER — IBUPROFEN 200 MG
16 TABLET ORAL
Status: DISCONTINUED | OUTPATIENT
Start: 2024-12-19 | End: 2024-12-21 | Stop reason: HOSPADM

## 2024-12-19 RX ORDER — PROCHLORPERAZINE EDISYLATE 5 MG/ML
5 INJECTION INTRAMUSCULAR; INTRAVENOUS EVERY 6 HOURS PRN
Status: DISCONTINUED | OUTPATIENT
Start: 2024-12-19 | End: 2024-12-21 | Stop reason: HOSPADM

## 2024-12-19 RX ORDER — NALOXONE HCL 0.4 MG/ML
0.02 VIAL (ML) INJECTION
Status: DISCONTINUED | OUTPATIENT
Start: 2024-12-19 | End: 2024-12-21 | Stop reason: HOSPADM

## 2024-12-19 RX ADMIN — HYDROXYZINE PAMOATE 25 MG: 25 CAPSULE ORAL at 04:12

## 2024-12-19 RX ADMIN — ACETAMINOPHEN 650 MG: 325 TABLET, FILM COATED ORAL at 09:12

## 2024-12-19 RX ADMIN — DIPHENHYDRAMINE HYDROCHLORIDE 12.5 MG: 50 INJECTION INTRAMUSCULAR; INTRAVENOUS at 04:12

## 2024-12-19 RX ADMIN — IPRATROPIUM BROMIDE AND ALBUTEROL SULFATE 3 ML: .5; 3 SOLUTION RESPIRATORY (INHALATION) at 04:12

## 2024-12-19 NOTE — PLAN OF CARE
12/19/24 1307   Discharge Assessment   Assessment Type Discharge Planning Assessment   Confirmed/corrected address, phone number and insurance Yes   Confirmed Demographics Correct on Facesheet   Source of Information patient   When was your last doctors appointment? 11/04/24  (Dr Francis)   Does patient/caregiver understand observation status Yes   Communicated CORNELL with patient/caregiver Yes   Reason For Admission chest pain   People in Home alone   Facility Arrived From: home   Do you expect to return to your current living situation? Yes   Do you have help at home or someone to help you manage your care at home? Yes   Who are your caregiver(s) and their phone number(s)? fmly   Current cognitive status: Alert/Oriented   Walking or Climbing Stairs Difficulty yes   Walking or Climbing Stairs ambulation difficulty, requires equipment   Mobility Management rw   Dressing/Bathing Difficulty no   Equipment Currently Used at Home walker, rolling;oxygen  ([address])   Readmission within 30 days? No   Patient currently being followed by outpatient case management? No   Do you currently have service(s) that help you manage your care at home? No   Do you take prescription medications? Yes   Do you have any problems affording any of your prescribed medications? No   Is the patient taking medications as prescribed? yes   Who is going to help you get home at discharge? fmly   How do you get to doctors appointments? family or friend will provide   Are you on dialysis? No   Discharge Plan A Home   Discharge Plan B Home Health   DME Needed Upon Discharge  other (see comments)  (tbd)   Discharge Plan discussed with: Patient   Transition of Care Barriers None   Housing Stability   In the last 12 months, was there a time when you were not able to pay the mortgage or rent on time? N   At any time in the past 12 months, were you homeless or living in a shelter (including now)? N   Transportation Needs   Has the lack of  transportation kept you from medical appointments, meetings, work or from getting things needed for daily living? No   Utilities   In the past 12 months has the electric, gas, oil, or water company threatened to shut off services in your home? No     Pt reports that she lives alone. She does not drive.has used HH but does not know name. She uses a rw, home [address] [facility],needs TBD   Statement Selected 103

## 2024-12-19 NOTE — H&P
[facility] Hospital Medicine History & Physical Examination       Patient Name: Shyanne Cannon  MRN: [id number]  Patient Class: IP- Inpatient   Admission Date: 12/18/2024   Admitting Physician: MAGALYS Service   Length of Stay: 0  Attending Physician: Saeed Sweeney MD  Primary Care Provider: Mayito Roberts MD  Face-to-Face encounter date: 12/19/2024  Code Status: FULL  Chief Complaint: slurred speech (Pt arrives via [facility], EMS reports daughter called due to slurred speech, lethargic, last known normal at 1900 12/17/24, pt does have slurred speech, some right sided facial droop. No unilateral weakness noted. Pt EMS . EMS reports pt able to ambulate on scene with walker.)                  HISTORY OF PRESENT ILLNESS:       74-year-old woman with significant past medical history of COPD, bipolar disorder with depression, diabetes mellitus was sent to ER by her daughter with complaints of new onset slurred speech, increased generalized weakness that started on 12/17/2024.  No family was at bedside during the encounter and HGB I is limited.  Most of the history gathered from ER note.  At presentation was somnolent, lethargic.  She is afebrile, hypertensive and was hypoxic requiring 2 L nasal cannula oxygen.  CT head showed no acute intracranial changes, revealed chronic microvascular changes.  Lab work revealed leukocytosis, STEVE with BUN 28.1/serum creatinine 1.16.  No chest x-ray was obtained.  UDS was positive for fentanyl.  UA was unremarkable.  Patient was put on BiPAP and admitted to hospital medicine service for further evaluation management.  When seen at bedside he was somnolent, opens her eyes, follows few commands and quickly falls back sleep.  Repeat CBGS pending    PAST MEDICAL HISTORY:   No past medical history on file.    PAST SURGICAL HISTORY:   No past surgical history on file.    ALLERGIES:   Patient has no allergy information on record.    FAMILY HISTORY:    Reviewed and negative    SOCIAL HISTORY:     Social History     Tobacco Use    Smoking status: Not on file    Smokeless tobacco: Not on file   Substance Use Topics    Alcohol use: Not on file        HOME MEDICATIONS:     Prior to Admission medications    Not on File       REVIEW OF SYSTEMS:   Except as documented, all other systems reviewed and negative     PHYSICAL EXAM:     VITAL SIGNS: 24 HRS MIN & MAX LAST   Temp  Min: 97.7 °F (36.5 °C)  Max: 98.3 °F (36.8 °C) 97.7 °F (36.5 °C)   BP  Min: 91/68  Max: 166/109 (!) 115/54   Pulse  Min: 72  Max: 88  80   Resp  Min: 9  Max: 22 18   SpO2  Min: 91 %  Max: 100 % 97 %     General appearance: Well-developed, well-nourished female in no apparent distress.  HENT: Atraumatic head. Moist mucous membranes of oral cavity.  Eyes: Normal extraocular movements.   Neck: Supple.   Lungs:  Nonlabored breathing, poor effort, BiPAP in place, no wheezing, basal faint rhonchi   Heart: Regular rate and rhythm. S1 and S2 present with no murmurs/gallop/rub. No pedal edema. No JVD present.   Abdomen: Soft, non-distended, non-tender. No rebound tenderness/guarding. Bowel sounds are normal.   Extremities: No cyanosis, clubbing, or edema.  Skin: No Rash.   Neuro:  Somnolent, limited exam, follows few commands   Psych/mental status:  Limited exam  LABS AND IMAGING:     Recent Labs   Lab 12/18/24 2137 12/19/24 0423   WBC 14.63* 14.86*   RBC 4.37 5.16   HGB 12.0 14.0   HCT 38.0 45.4   MCV 87.0 88.0   MCH 27.5 27.1   MCHC 31.6* 30.8*   RDW 14.6 14.6    309   MPV 9.6 9.9       Recent Labs   Lab 12/18/24 2137 12/19/24  0040 12/19/24  0422 12/19/24  0500 12/19/24  0850     --  139  --   --    K 4.7  --  5.0  --   --      --  104  --   --    CO2 30  --  27  --   --    BUN 28.1*  --  24.1*  --   --    CREATININE 1.16*  --  0.84  --   --    CALCIUM 9.5  --  9.6  --   --    PH  --  7.310*  --  7.320* 7.380   MG  --   --  1.70  --   --    ALBUMIN 3.7  --  4.0  --   --    ALKPHOS 66   --  75  --   --    ALT 14  --  15  --   --    AST 17  --  18  --   --    BILITOT 0.3  --  0.5  --   --        Microbiology Results (last 7 days)       ** No results found for the last 168 hours. **             CT Head Without Contrast  EXAMINATION  CT HEAD WITHOUT CONTRAST    CLINICAL HISTORY  Mental status change, unknown cause;    TECHNIQUE  Axial non-contrast CT images of the head were acquired and multiplanar reconstructions accomplished by a CT technologist at a separate workstation, pushed to PACS for physician review.    COMPARISON  23 September 2020    FINDINGS  Images were reviewed in subdural, brain, soft tissue, and bone windows.    Exam quality: Motion/streak artifact limits assessment of the posterior fossa.    Hemorrhage: No evidence of acute hyperattenuating blood products.    Parenchyma: There is diffuse bilateral supratentorial white matter hypoattenuation, typical of chronic microvascular changes. No discrete mass, mass effect, or CT evidence of acute large vascular territory insult. Gray-white differentiation is preserved. No midline shift is evident.    CSF spaces: Proportional appearance of ventricular and sulcal enlargement. No hydrocephalus. No masses or fluid collections.    Other findings: No focally hyperdense artery. Scattered carotid siphon calcifications are present. No abnormal densities within the dural sinuses. No abnormalities of the scalp or subjacent osseous structures. Mastoids are well aerated. No focal abnormality of the sella. The included facial structures are unremarkable.    IMPRESSION  1. No convincing acute intracranial abnormality.  2. Additional secondary details discussed above, relatively unchanged from the comparison CT appearance.  ==========    No significant discrepancy identified in relation to the teleradiology preliminary report.    RADIATION DOSE  Automated tube current modulation, weight-based exposure dosing, and/or iterative reconstruction technique utilized  to reach lowest reasonably achievable exposure rate.    DLP: 876 mGy*cm    Electronically signed by: Froy Thornton  Date:    12/19/2024  Time:    07:18      ASSESSMENT & PLAN:     Acute neurological deficits-slurred speech-CVA ruled out   Acute hypoxemic respiratory failure admission requiring BiPAP  Acute encephalopathy CVA versus hypercapnia induced   Leukocytosis at admission-?  Reactive versus infectious causes   Obesity,?  Undiagnosed KRISTOFER     HX: COPD, bipolar disorder with depression, diabetes mellitus    Plan:   -no x-ray was obtained at admission.  We will get stat x-ray.  Repeat ABG.  Wean off BiPAP as tolerated.  Add neb treatments.  Will obtain TTE if necessary.  Monitor closely.  -CT head reviewed.  Obtain MRI to rule out stroke.  Involve Neurology if necessary  -PT OT SLP  - Continue sliding scale.  Check A1c for tomorrow  -will review home meds after reconciliation.       SCDs    Critical care diagnosis:  Acute hypercapnic respiratory failure requiring BiPAP, acute encephalopathy  Critical care time: 50 minutes      __________________________________________________________________________  INPATIENT LIST OF MEDICATIONS     Current Facility-Administered Medications:     acetaminophen tablet 650 mg, 650 mg, Oral, Q6H PRN, Isis Santana AGACNP-BC    aluminum-magnesium hydroxide-simethicone 200-200-20 mg/5 mL suspension 30 mL, 30 mL, Oral, QID PRN, Isis Santana AGACNP-BC    dextrose 50% injection 12.5 g, 12.5 g, Intravenous, PRN, Isis Santana AGACNP-BC    dextrose 50% injection 25 g, 25 g, Intravenous, PRN, Isis Santana AGACNP-BC    diphenhydrAMINE injection 12.5 mg, 12.5 mg, Intravenous, Once PRN, Saeed Sweeney MD    enoxaparin injection 40 mg, 40 mg, Subcutaneous, Daily, Isis Santana AGACNP-BC    glucagon (human recombinant) injection 1 mg, 1 mg, Intramuscular, PRN, Isis Santana, MONIQUE-BC    glucose chewable tablet 16 g, 16 g, Oral, PRN, Isis Santana, MONIQUE-BC     glucose chewable tablet 24 g, 24 g, Oral, PRN, Isis Santana, AGACNP-BC    hydrOXYzine pamoate capsule 25 mg, 25 mg, Oral, Once PRN, Saeed Sweeney MD    [START ON 12/20/2024] influenza (adjuvanted) (Fluad) 45 mcg/0.5 mL IM vaccine (> or = 64 yo) 0.5 mL, 0.5 mL, Intramuscular, Prior to discharge, Reyes, Thairy G, DO    insulin aspart U-100 injection 0-10 Units, 0-10 Units, Subcutaneous, QID (AC + HS) PRN, Isis Santana, AGACNP-BC    melatonin tablet 6 mg, 6 mg, Oral, Nightly PRN, Isis Santana, AGACNP-BC    naloxone 0.4 mg/mL injection 0.02 mg, 0.02 mg, Intravenous, PRN, Isis Santana, AGACNP-BC    ondansetron injection 4 mg, 4 mg, Intravenous, Q4H PRN, Isis Santana, AGACNP-BC    polyethylene glycol packet 17 g, 17 g, Oral, BID PRN, Isis Santana, AGACNP-BC    prochlorperazine injection Soln 5 mg, 5 mg, Intravenous, Q6H PRN, Isis Santana, AGACNP-BC    simethicone chewable tablet 80 mg, 1 tablet, Oral, QID PRN, Isis Santana, AGACNP-BC      Scheduled Meds:   enoxparin  40 mg Subcutaneous Daily     Continuous Infusions:  PRN Meds:.  Current Facility-Administered Medications:     acetaminophen, 650 mg, Oral, Q6H PRN    aluminum-magnesium hydroxide-simethicone, 30 mL, Oral, QID PRN    dextrose 50%, 12.5 g, Intravenous, PRN    dextrose 50%, 25 g, Intravenous, PRN    diphenhydrAMINE, 12.5 mg, Intravenous, Once PRN    glucagon (human recombinant), 1 mg, Intramuscular, PRN    glucose, 16 g, Oral, PRN    glucose, 24 g, Oral, PRN    hydrOXYzine pamoate, 25 mg, Oral, Once PRN    [START ON 12/20/2024] influenza (adjuvanted), 0.5 mL, Intramuscular, Prior to discharge    insulin aspart U-100, 0-10 Units, Subcutaneous, QID (AC + HS) PRN    melatonin, 6 mg, Oral, Nightly PRN    naloxone, 0.02 mg, Intravenous, PRN    ondansetron, 4 mg, Intravenous, Q4H PRN    polyethylene glycol, 17 g, Oral, BID PRN    prochlorperazine, 5 mg, Intravenous, Q6H PRN    simethicone, 1 tablet, Oral, QID PRN      Saeed DELGADO  MD Zahra   12/19/2024     Screening for Social Drivers for health:  Patient screened for food insecurity, housing instability, transportation needs, utility difficulties, and interpersonal safety (select all that apply as identified as concern)  []Housing or Food  []Transportation Needs  []Utility Difficulties  []Interpersonal safety  [x]None

## 2024-12-19 NOTE — ED PROVIDER NOTES
Encounter Date: 12/18/2024    SCRIBE #1 NOTE: I, Augustin Pedersen, am scribing for, and in the presence of,  Jimmy Hernandez MD. I have scribed the following portions of the note - Other sections scribed: HPI,ROS,PE.       History     Chief Complaint   Patient presents with    slurred speech     Pt arrives via [facility], EMS reports daughter called due to slurred speech, lethargic, last known normal at 1900 12/17/24, pt does have slurred speech, some right sided facial droop. No unilateral weakness noted. Pt EMS . EMS reports pt able to ambulate on scene with walker.     75 y/o female with PMHx of GERD, DM, COPD, bipolar disorder, and depression presents to ED via EMS c/o slurred speech onset 12/17. Pt reports she woke up on onset and noticed she had slurred speech. She denies any pain, headache, or vision changes.     Patient fairly unreliable historian.  Very drowsy    The history is provided by the patient, medical records and the EMS personnel. No  was used.     Review of patient's allergies indicates:  Not on File  No past medical history on file.  No past surgical history on file.  No family history on file.     Review of Systems   Eyes:  Negative for visual disturbance.   Musculoskeletal:  Negative for arthralgias and myalgias.   Neurological:  Positive for speech difficulty. Negative for headaches.       Physical Exam     Initial Vitals [12/18/24 2055]   BP Pulse Resp Temp SpO2   (!) 162/91 88 16 98.3 °F (36.8 °C) 97 %      MAP       --         Physical Exam    Nursing note and vitals reviewed.  Constitutional: She appears well-developed. She is not diaphoretic. She is Obese . No distress.   HENT:   Head: Normocephalic and atraumatic.   Right Ear: External ear normal.   Left Ear: External ear normal.   Nose: Nose normal.   Eyes: Conjunctivae and EOM are normal. Pupils are equal, round, and reactive to light. Right eye exhibits no discharge. Left eye exhibits no discharge.   Pupils  2mm-3mm, round and reactive to light bilaterally.    Cardiovascular:  Normal rate, regular rhythm, normal heart sounds and intact distal pulses.     Exam reveals no gallop and no friction rub.       No murmur heard.  Pulmonary/Chest: Breath sounds normal. No respiratory distress. She has no wheezes. She has no rhonchi. She has no rales. She exhibits no tenderness.   Abdominal: Abdomen is soft. Bowel sounds are normal. She exhibits no distension and no mass. There is no abdominal tenderness. There is no rebound and no guarding.   Musculoskeletal:         General: No edema. Normal range of motion.     Neurological: She is oriented to person, place, and time. No cranial nerve deficit or sensory deficit.   Drowsy but arousable.   Slurred speech  No obvious facial droop.   5/5 strength to BUE.  4/5 strength to LLE.   5/5 strength to RLE   Skin: Skin is warm and dry. Capillary refill takes less than 2 seconds. No erythema. No pallor.         ED Course   Critical Care    Date/Time: 12/19/2024 1:34 AM    Performed by: Jimmy Hernandez MD  Authorized by: Jimmy Hernandez MD  Direct patient critical care time: 9 minutes  Additional history critical care time: 7 minutes  Ordering / reviewing critical care time: 8 minutes  Documentation critical care time: 5 minutes  Consulting other physicians critical care time: 6 minutes  Total critical care time (exclusive of procedural time) : 35 minutes  Critical care time was exclusive of separately billable procedures and treating other patients.  Critical care was necessary to treat or prevent imminent or life-threatening deterioration of the following conditions: respiratory failure and metabolic crisis.  Critical care was time spent personally by me on the following activities: development of treatment plan with patient or surrogate, discussions with consultants, interpretation of cardiac output measurements, evaluation of patient's response to treatment, examination of patient,  ordering and performing treatments and interventions, ordering and review of laboratory studies, obtaining history from patient or surrogate, ordering and review of radiographic studies, pulse oximetry and re-evaluation of patient's condition.  Comments: Requiring BiPAP        Labs Reviewed   COMPREHENSIVE METABOLIC PANEL - Abnormal       Result Value    Sodium 141      Potassium 4.7      Chloride 102      CO2 30      Glucose 111      Blood Urea Nitrogen 28.1 (*)     Creatinine 1.16 (*)     Calcium 9.5      Protein Total 6.6      Albumin 3.7      Globulin 2.9      Albumin/Globulin Ratio 1.3      Bilirubin Total 0.3      ALP 66      ALT 14      AST 17      eGFR 50      Anion Gap 9.0      BUN/Creatinine Ratio 24     PROTIME-INR - Abnormal    PT 12.0 (*)     INR 0.9     CBC WITH DIFFERENTIAL - Abnormal    WBC 14.63 (*)     RBC 4.37      Hgb 12.0      Hct 38.0      MCV 87.0      MCH 27.5      MCHC 31.6 (*)     RDW 14.6      Platelet 319      MPV 9.6      Neut % 81.1      Lymph % 11.5      Mono % 5.3      Eos % 1.2      Basophil % 0.3      Lymph # 1.68      Neut # 11.88 (*)     Mono # 0.77      Eos # 0.17      Baso # 0.04      IG# 0.09 (*)     IG% 0.6      NRBC% 0.0     URINALYSIS, REFLEX TO URINE CULTURE - Abnormal    Color, UA Light-Yellow      Appearance, UA Clear      Specific Gravity, UA 1.014      pH, UA 5.5      Protein, UA Negative      Glucose, UA Normal      Ketones, UA Negative      Blood, UA Negative      Bilirubin, UA Negative      Urobilinogen, UA Normal      Nitrites, UA Negative      Leukocyte Esterase, UA Negative      RBC, UA 0-5      WBC, UA 0-5      Bacteria, UA None Seen      Squamous Epithelial Cells, UA None Seen      Hyaline Casts, UA 0-2 (*)    DRUG SCREEN, URINE (BEAKER) - Abnormal    Amphetamines, Urine Negative      Barbiturates, Urine Negative      Benzodiazepine, Urine Negative      Cannabinoids, Urine Negative      Cocaine, Urine Negative      Fentanyl, Urine Positive (*)     MDMA, Urine  Negative      Opiates, Urine Negative      Phencyclidine, Urine Negative      pH, Urine 5.5      Specific Gravity, Urine Auto 1.014      Narrative:     Cut off concentrations:    Amphetamines - 1000 ng/ml  Barbiturates - 200 ng/ml  Benzodiazepine - 200 ng/ml  Cannabinoids (THC) - 50 ng/ml  Cocaine - 300 ng/ml  Fentanyl - 1.0 ng/ml  MDMA - 500 ng/ml  Opiates - 300 ng/ml   Phencyclidine (PCP) - 25 ng/ml    Specimen submitted for drug analysis and tested for pH and specific gravity in order to evaluate sample integrity. Suspect tampering if specific gravity is <1.003 and/or pH is not within the range of 4.5 - 8.0  False negatives may result form substances such as bleach added to urine.  False positives may result for the presence of a substance with similar chemical structure to the drug or its metabolite.    This test provides only a PRELIMINARY analytical test result. A more specific alternate chemical method must be used in order to obtain a confirmed analytical result. Gas chromatography/mass spectrometry (GC/MS) is the preferred confirmatory method. Other chemical confirmation methods are available. Clinical consideration and professional judgement should be applied to any drug of abuse test result, particularly when preliminary positive results are used.    Positive results will be confirmed only at the physicians request. Unconfirmed screening results are to be used only for medical purposes (treatment).        BLOOD GAS - Abnormal    Sample Type Arterial Blood      Sample site Left Radial Artery      Drawn by HT RRT      pH, Blood gas 7.310 (*)     pCO2, Blood gas 65.0 (*)     pO2, Blood gas 90.0      Sodium, Blood Gas 138      Potassium, Blood Gas 4.6      Calcium Level Ionized 1.34 (*)     TOC2, Blood gas 34.7      Base Excess, Blood gas 4.60 (*)     sO2, Blood gas 96.1      HCO3, Blood gas 32.7 (*)     THb, Blood gas 12.8      O2 Hb, Blood Gas 95.3      CO Hgb 2.0 (*)     Met Hgb 1.0      Allens Test Yes       Oxygen Device, Blood gas Cannula      LPM 2     POCT GLUCOSE - Abnormal    POCT Glucose 147 (*)    APTT - Normal    PTT 23.2     AMMONIA - Normal    Ammonia Level 27.2     ALCOHOL,MEDICAL (ETHANOL) - Normal    Ethanol Level <10.0     CBC W/ AUTO DIFFERENTIAL    Narrative:     The following orders were created for panel order CBC auto differential.  Procedure                               Abnormality         Status                     ---------                               -----------         ------                     CBC with Differential[[id number]]       Abnormal            Final result                 Please view results for these tests on the individual orders.   COMPREHENSIVE METABOLIC PANEL   MAGNESIUM   PHOSPHORUS   CBC W/ AUTO DIFFERENTIAL    Narrative:     The following orders were created for panel order CBC with Automated Differential.  Procedure                               Abnormality         Status                     ---------                               -----------         ------                     CBC with Differential[[id number]]                           In process                   Please view results for these tests on the individual orders.   BLOOD GAS   CBC WITH DIFFERENTIAL   POCT GLUCOSE, HAND-HELD DEVICE   POCT GLUCOSE MONITORING CONTINUOUS          Imaging Results              CT Head Without Contrast (Preliminary result)  Result time 12/18/24 21:59:04      Preliminary result by Ariel Bernard Jr., MD (12/18/24 21:59:04)                   Narrative:    START OF REPORT:  Technique: CT of the head was performed without intravenous contrast with axial as well as coronal and sagittal images.    Comparison: None.    Dosage Information: Automated exposure control was utilized.    Clinical history: Slurred speech, right side facial droop, no unilateral weakness.    Findings:  Hemorrhage: No acute intracranial hemorrhage is seen.  CSF spaces: The ventricles, sulci and basal  cisterns all appear mildly prominent consistent with global cerebral atrophy.  Brain parenchyma: Mild microvascular change is seen in portions of the periventricular and deep white matter tracts.  Cerebellum: Unremarkable.  Sella and skull base: The sella appears to be within normal limits for age.  Intracranial calcifications: Incidental note is made of bilateral choroid plexus calcification. Incidental note is made of some pineal region calcification. Incidental note is made of some calcification of the falx.  Calvarium: No acute linear or depressed skull fracture is seen.    Maxillofacial Structures:  Paranasal sinuses: The visualized paranasal sinuses appear clear with no mucoperiosteal thickening or air fluid levels identified.  Orbits: The orbits appear unremarkable.  Zygomatic arches: The zygomatic arches are intact and unremarkable.  Temporal bones and mastoids: The temporal bones and mastoids appear unremarkable.  TMJ: The mandibular condyles appear normally placed with respect to the mandibular fossa.      Impression:  1. No acute intracranial process identified. Details and findings as noted above.                                         Medications   glucose chewable tablet 16 g (has no administration in time range)   glucose chewable tablet 24 g (has no administration in time range)   dextrose 50% injection 12.5 g (has no administration in time range)   dextrose 50% injection 25 g (has no administration in time range)   glucagon (human recombinant) injection 1 mg (has no administration in time range)   insulin aspart U-100 injection 0-10 Units (has no administration in time range)   melatonin tablet 6 mg (has no administration in time range)   ondansetron injection 4 mg (has no administration in time range)   prochlorperazine injection Soln 5 mg (has no administration in time range)   polyethylene glycol packet 17 g (has no administration in time range)   acetaminophen tablet 650 mg (has no  administration in time range)   simethicone chewable tablet 80 mg (has no administration in time range)   aluminum-magnesium hydroxide-simethicone 200-200-20 mg/5 mL suspension 30 mL (has no administration in time range)   naloxone 0.4 mg/mL injection 0.02 mg (has no administration in time range)   enoxaparin injection 40 mg (has no administration in time range)             Scribe Attestation:   Scribe #1: I performed the above scribed service and the documentation accurately describes the services I performed. I attest to the accuracy of the note.    Attending Attestation:           Physician Attestation for Scribe:  Physician Attestation Statement for Scribe #1: I, Jimmy Hernandez MD, reviewed documentation, as scribed by Augustin Pedersen in my presence, and it is both accurate and complete.         Medical Decision Making  The differential diagnosis includes, but is not limited to, Metabolic abnormality, intoxication, toxic ingestion, CVA, infection, structural (SAH, ICH, trauma, neoplastic), seizure/postictal, polypharmacy     Patient found to be hypercapnic.  I believe this to be why she is so altered here today.  Remainder of labs were largely unrevealing.  Does have history of COPD.  Will start on BiPAP.  We will seek admission for further evaluation monitoring.  Care to be transferred.      Amount and/or Complexity of Data Reviewed  External Data Reviewed: notes.     Details: See ED course  Labs: ordered. Decision-making details documented in ED Course.  Radiology: ordered and independent interpretation performed.     Details: CT revealing for acute  ECG/medicine tests: ordered and independent interpretation performed. Decision-making details documented in ED Course.    Risk  Decision regarding hospitalization.            ED Course as of 12/19/24 0516   Wed Dec 18, 2024   2133 History of COPD, falls, pneumonia [MM]   2256 CBC auto differential(!)  Leukocytosis similar to prior.  No significant anemia [MM]    2256 Comprehensive metabolic panel(!)  Mildly elevated BUN and creatinine ratio suggesting some prerenal azotemia.  No obvious electrolyte abnormality otherwise [MM]   2346 Ammonia: 27.2 [MM]   2346 Alcohol, Serum: <10.0 [MM]   Thu Dec 19, 2024   0108 POC PCO2(!!): 65.0  We will place on BiPAP [MM]   0328 EKG done at 3:16 a.m. shows sinus rhythm rate of 74 .  Normal axis.  Incomplete right bundle-branch block morphology.  No obvious ST elevation or depression. [MM]   0515 CT Head Without Contrast  CT head negative for acute [MM]      ED Course User Index  [MM] Jimmy Hernandez MD                           Clinical Impression:  Final diagnoses:  [R41.82] AMS (altered mental status)  [R06.89] Hypercapnia (Primary)          ED Disposition Condition    Admit Stable                Jimmy Hernandez MD  12/19/24 0530

## 2024-12-20 LAB
ALBUMIN SERPL-MCNC: 3.2 G/DL (ref 3.4–4.8)
ALBUMIN/GLOB SERPL: 1.2 RATIO (ref 1.1–2)
ALP SERPL-CCNC: 62 UNIT/L (ref 40–150)
ALT SERPL-CCNC: 12 UNIT/L (ref 0–55)
ANION GAP SERPL CALC-SCNC: 11 MEQ/L
AST SERPL-CCNC: 12 UNIT/L (ref 5–34)
BASOPHILS # BLD AUTO: 0.04 X10(3)/MCL
BASOPHILS NFR BLD AUTO: 0.3 %
BILIRUB SERPL-MCNC: 0.3 MG/DL
BUN SERPL-MCNC: 29.7 MG/DL (ref 9.8–20.1)
CALCIUM SERPL-MCNC: 8.7 MG/DL (ref 8.4–10.2)
CHLORIDE SERPL-SCNC: 99 MMOL/L (ref 98–107)
CO2 SERPL-SCNC: 26 MMOL/L (ref 23–31)
CREAT SERPL-MCNC: 0.8 MG/DL (ref 0.55–1.02)
CREAT/UREA NIT SERPL: 37
EOSINOPHIL # BLD AUTO: 0.21 X10(3)/MCL (ref 0–0.9)
EOSINOPHIL NFR BLD AUTO: 1.8 %
ERYTHROCYTE [DISTWIDTH] IN BLOOD BY AUTOMATED COUNT: 14.6 % (ref 11.5–17)
EST. AVERAGE GLUCOSE BLD GHB EST-MCNC: 128.4 MG/DL
GFR SERPLBLD CREATININE-BSD FMLA CKD-EPI: >60 ML/MIN/1.73/M2
GLOBULIN SER-MCNC: 2.7 GM/DL (ref 2.4–3.5)
GLUCOSE SERPL-MCNC: 225 MG/DL (ref 82–115)
HBA1C MFR BLD: 6.1 %
HCT VFR BLD AUTO: 34.6 % (ref 37–47)
HGB BLD-MCNC: 10.9 G/DL (ref 12–16)
IMM GRANULOCYTES # BLD AUTO: 0.05 X10(3)/MCL (ref 0–0.04)
IMM GRANULOCYTES NFR BLD AUTO: 0.4 %
LYMPHOCYTES # BLD AUTO: 2.07 X10(3)/MCL (ref 0.6–4.6)
LYMPHOCYTES NFR BLD AUTO: 17.9 %
MAGNESIUM SERPL-MCNC: 1.4 MG/DL (ref 1.6–2.6)
MCH RBC QN AUTO: 27.4 PG (ref 27–31)
MCHC RBC AUTO-ENTMCNC: 31.5 G/DL (ref 33–36)
MCV RBC AUTO: 86.9 FL (ref 80–94)
MONOCYTES # BLD AUTO: 0.67 X10(3)/MCL (ref 0.1–1.3)
MONOCYTES NFR BLD AUTO: 5.8 %
NEUTROPHILS # BLD AUTO: 8.52 X10(3)/MCL (ref 2.1–9.2)
NEUTROPHILS NFR BLD AUTO: 73.8 %
NRBC BLD AUTO-RTO: 0 %
PLATELET # BLD AUTO: 300 X10(3)/MCL (ref 130–400)
PMV BLD AUTO: 9.5 FL (ref 7.4–10.4)
POCT GLUCOSE: 137 MG/DL (ref 70–110)
POTASSIUM SERPL-SCNC: 4.2 MMOL/L (ref 3.5–5.1)
PROT SERPL-MCNC: 5.9 GM/DL (ref 5.8–7.6)
RBC # BLD AUTO: 3.98 X10(6)/MCL (ref 4.2–5.4)
SODIUM SERPL-SCNC: 136 MMOL/L (ref 136–145)
WBC # BLD AUTO: 11.56 X10(3)/MCL (ref 4.5–11.5)

## 2024-12-20 PROCEDURE — 80053 COMPREHEN METABOLIC PANEL: CPT | Performed by: INTERNAL MEDICINE

## 2024-12-20 PROCEDURE — 85025 COMPLETE CBC W/AUTO DIFF WBC: CPT | Performed by: INTERNAL MEDICINE

## 2024-12-20 PROCEDURE — 97166 OT EVAL MOD COMPLEX 45 MIN: CPT

## 2024-12-20 PROCEDURE — 25000003 PHARM REV CODE 250: Performed by: INTERNAL MEDICINE

## 2024-12-20 PROCEDURE — 21400001 HC TELEMETRY ROOM

## 2024-12-20 PROCEDURE — 63700000 PHARM REV CODE 250 ALT 637 W/O HCPCS: Performed by: INTERNAL MEDICINE

## 2024-12-20 PROCEDURE — 25000242 PHARM REV CODE 250 ALT 637 W/ HCPCS: Performed by: INTERNAL MEDICINE

## 2024-12-20 PROCEDURE — 36415 COLL VENOUS BLD VENIPUNCTURE: CPT | Performed by: INTERNAL MEDICINE

## 2024-12-20 PROCEDURE — 99900031 HC PATIENT EDUCATION (STAT)

## 2024-12-20 PROCEDURE — 83735 ASSAY OF MAGNESIUM: CPT | Performed by: INTERNAL MEDICINE

## 2024-12-20 PROCEDURE — 94760 N-INVAS EAR/PLS OXIMETRY 1: CPT

## 2024-12-20 PROCEDURE — 25000003 PHARM REV CODE 250: Performed by: STUDENT IN AN ORGANIZED HEALTH CARE EDUCATION/TRAINING PROGRAM

## 2024-12-20 PROCEDURE — 27000221 HC OXYGEN, UP TO 24 HOURS

## 2024-12-20 PROCEDURE — 92610 EVALUATE SWALLOWING FUNCTION: CPT

## 2024-12-20 PROCEDURE — 97162 PT EVAL MOD COMPLEX 30 MIN: CPT

## 2024-12-20 PROCEDURE — 83036 HEMOGLOBIN GLYCOSYLATED A1C: CPT | Performed by: INTERNAL MEDICINE

## 2024-12-20 PROCEDURE — 94640 AIRWAY INHALATION TREATMENT: CPT

## 2024-12-20 PROCEDURE — 94799 UNLISTED PULMONARY SVC/PX: CPT

## 2024-12-20 PROCEDURE — 99900035 HC TECH TIME PER 15 MIN (STAT)

## 2024-12-20 RX ORDER — NYSTATIN 100000 [USP'U]/G
POWDER TOPICAL 2 TIMES DAILY
Status: DISCONTINUED | OUTPATIENT
Start: 2024-12-20 | End: 2024-12-21 | Stop reason: HOSPADM

## 2024-12-20 RX ADMIN — NYSTATIN: 100000 POWDER TOPICAL at 10:12

## 2024-12-20 RX ADMIN — FLUCONAZOLE 150 MG: 50 TABLET ORAL at 06:12

## 2024-12-20 RX ADMIN — IPRATROPIUM BROMIDE AND ALBUTEROL SULFATE 3 ML: .5; 3 SOLUTION RESPIRATORY (INHALATION) at 03:12

## 2024-12-20 RX ADMIN — IPRATROPIUM BROMIDE AND ALBUTEROL SULFATE 3 ML: .5; 3 SOLUTION RESPIRATORY (INHALATION) at 01:12

## 2024-12-20 RX ADMIN — NYSTATIN: 100000 POWDER TOPICAL at 09:12

## 2024-12-20 RX ADMIN — MICONAZOLE NITRATE: 20 POWDER TOPICAL at 01:12

## 2024-12-20 NOTE — PROGRESS NOTES
Hospital Medicine  Progress Note    Patient Name: Shyanne Cannon  MRN: [id number]  Status: IP- Inpatient   Admission Date: 12/18/2024  Length of Stay: 1  Date of Service: 12/20/2024       CC: hospital follow-up for AMS       SUBJECTIVE   74-year-old female with a history significant for COPD, bipolar disorder with depression, and diabetes mellitus was sent to ED by her daughterdut to reports of new onset slurred speech, increased generalized weakness that started on 12/17.  No family was at bedside during the encounter and HPI was limited; history gathered from ED note.  On presentation she was somnolent, lethargic.  She was afebrile, hypertensive and hypoxic, requiring 2 L supplemental oxygen.  CT head without acute intracranial abnormalities, revealed chronic microvascular changes.  Lab work revealed leukocytosis, STEVE with BUN 28, serum creatinine 1.16.  UDS was positive for fentanyl.  UA was unremarkable.  Patient was put on BiPAP and admitted to hospital medicine service for further evaluation and management.  Chest x-ray was unremarkable, though ABG with a pH of 2 and a pCO2 of 63.    Today: Patient seen and examined at bedside, and chart reviewed.  Patient was continued on BiPAP overnight, this morning mentation is improved she has been weaned off BiPAP and oxygenating well on room air.      MEDICATIONS   Scheduled   albuterol-ipratropium  3 mL Nebulization Q8H    nystatin   Topical (Top) BID     Continuous Infusions  None      PHYSICAL EXAM   VITALS: T 98.6 °F (37 °C)   BP (!) 110/57   P 87   RR 20   O2 96 %    GENERAL: Awake and in NAD  LUNGS: CTA anteriorly, decreased air movement in the bases, no wheezing  CVS: Normal rate  GI/: Soft, NT, bowel sounds positive.  EXTREMITIES: trace LE edema  NEURO: AAOx3  PSYCH: Cooperative      LABS   CBC  Recent Labs     12/19/24  0423 12/20/24  0448   WBC 14.86* 11.56*   RBC 5.16 3.98*   HGB 14.0 10.9*   HCT 45.4 34.6*   MCV 88.0 86.9   MCH 27.1 27.4   MCHC 30.8*  31.5*   RDW 14.6 14.6    300     CHEM  Recent Labs     12/19/24  0422 12/20/24  0448    136   K 5.0 4.2   CO2 27 26   BUN 24.1* 29.7*   CREATININE 0.84 0.80   GLUCOSE 96 225*   CALCIUM 9.6 8.7   MG 1.70 1.40*   PHOS 3.9  --    ALBUMIN 4.0 3.2*   GLOBULIN 3.7* 2.7   ALKPHOS 75 62   ALT 15 12   AST 18 12   BILITOT 0.5 0.3           DIAGNOSTICS   MRI Brain Without Contrast  Narrative:   No intracranial mass or lesion is seen.  No hemorrhage is seen.  No acute infarct is seen.  No restricted diffusion abnormality is seen.  There is diffuse cerebral atrophy seen along with some compensatory ventricular dilatation and periventricular white matter change consistent with patient's age.  Posterior fossa appears normal.  Calvarium is intact.  Paranasal sinuses appear grossly unremarkable.  Impression:   Chronic age related changes  Otherwise unremarkable  Electronically signed by: Derik Estes  Date:    12/19/2024  Time:    17:44    X-Ray Chest 1 View  Narrative:  Frontal view of the chest was obtained. The heart is not enlarged.  There is no new focal consolidation or pneumothorax.  Impression:   No acute findings.  Electronically signed by: Srinivasan Bautista  Date:    12/19/2024  Time:    14:41      ASSESSMENT   Acute encephalopathy, suspect metabolic vs toxic  Acute hypoxic/hypercapnic respiratory failure  h/o COPD, stable  h/o bipolar disorder    PLAN   Continue to monitor off BiPAP/oxygen  If stable today, could consider discharge tomorrow  Home meds not available for review        Prophylaxis: B/LSCDs        Willian Dos Santos MD  [facility] Medicine

## 2024-12-20 NOTE — PT/OT/SLP EVAL
[facility]  Speech Language Pathology Department  Clinical Swallow Evaluation    Patient Name:  Shyanne Cannon   MRN:  [id number]    Recommendations     General recommendations:  SLP intervention not indicated  Solid texture recommendation:  Mechanical soft  Liquid consistency recommendation: Thin liquids - IDDSI Level 0   Medications: crushed in puree  Swallow strategies/precautions: small bites/sips, slow rate, alternate solids/liquids, and upright for PO intake  Precautions: fall    History     Shyanne Cannon is a 74 y.o. female with a medical diagnosis of acute neurological deficits-CVA ruled out, acute hypoxemic respiratory failure, and acute encephalopathy.     No past medical history on file.  No past surgical history on file.    Home diet texture/consistency: Soft & Bite-sized and thin liquids  Current method of nutrition: NPO    Imaging   Results for orders placed during the hospital encounter of 12/18/24    X-Ray Chest 1 View    Narrative  EXAMINATION:  XR CHEST 1 VIEW    CLINICAL HISTORY:  Shortness of breadth;    COMPARISON:  25 November 2020    FINDINGS:  Frontal view of the chest was obtained. The heart is not enlarged.  There is no new focal consolidation or pneumothorax.    Impression  No acute findings.      Electronically signed by: Srinivasan Bautista  Date:    12/19/2024  Time:    14:41    No results found for this or any previous visit.    Results for orders placed during the hospital encounter of 12/18/24    MRI Brain Without Contrast    Narrative  EXAMINATION:  MRI BRAIN WITHOUT CONTRAST    CLINICAL HISTORY:  AMS;    TECHNIQUE:  Multiplanar multisequence MR imaging of the brain was performed without contrast.    COMPARISON:  CT scan dated 12/18/2024    FINDINGS:  No intracranial mass or lesion is seen.  No hemorrhage is seen.  No acute infarct is seen.  No restricted diffusion abnormality is seen.  There is diffuse cerebral atrophy seen along with some  compensatory ventricular dilatation and periventricular white matter change consistent with patient's age.  Posterior fossa appears normal.  Calvarium is intact.  Paranasal sinuses appear grossly unremarkable.    Impression  Chronic age related changes    Otherwise unremarkable      Electronically signed by: Derik Estes  Date:    12/19/2024  Time:    17:44    Subjective     Patient awake, alert, and cooperative.    Spiritual/Cultural/[employer]aritan Beliefs/Practices that affect care: no    Pain/Comfort: Pain Rating 1: 0/10    Objective     ORAL MUSCULATURE  Dentition: edentulous  Secretion Management: adequate  Mucosal Quality: good  Facial Movement: WFL  Buccal Strength & Mobility: WFL  Mandibular Strength & Mobility: WFL  Oral Labial Strength & Mobility: WFL  Lingual Strength & Mobility: WFL  Vocal Quality: adequate    PO TRIALS  Consistency Fed By Oral Symptoms Pharyngeal Symptoms   Thin liquid by cup Self None None   Thin liquid by straw SLP None None   Chewable solid Self Prolonged bolus formation/mastication None     Assessment     No signs/sx of aspiration. Continue with baseline diet. ST to sign off.    Outcome Measures     Functional Oral Intake Scale: 5 - Total oral diet with multiple consistencies, by requiring special preparation or compensations    Goals     Multidisciplinary Problems       SLP Goals       Not on file                  Education     Patient provided with verbal education regarding ST POC.  Understanding was verbalized.    Plan     SLP Follow-Up:  No   Plan of Care reviewed with:  patient     Time Tracking     SLP Treatment Date:   12/20/24  Speech Start Time:  0900  Speech Stop Time:  0920     Speech Total Time (min):  20 min    Billable minutes:  Swallow and Oral Function Evaluation, 20 minutes     12/20/2024              No

## 2024-12-20 NOTE — PROGRESS NOTES
[facility] [address]  Wound Care    Patient Name:  Shyanne Cannon   MRN:  [id number]  Date: 12/20/2024  Diagnosis: <principal problem not specified>    History:     No past medical history on file.    Social History     Socioeconomic History    Marital status: Single     Social Drivers of Health     Financial Resource Strain: Low Risk  (12/19/2024)    Overall Financial Resource Strain (CARDIA)     Difficulty of Paying Living Expenses: Not hard at all   Food Insecurity: No Food Insecurity (12/19/2024)    Hunger Vital Sign     Worried About Running Out of Food in the Last Year: Never true     Ran Out of Food in the Last Year: Never true   Transportation Needs: No Transportation Needs (12/19/2024)    TRANSPORTATION NEEDS     Transportation : No   Stress: No Stress Concern Present (12/19/2024)    [employer] [place] [employer] - Occupational Stress Questionnaire     Feeling of Stress : Not at all   Housing Stability: Low Risk  (12/19/2024)    Housing Stability Vital Sign     Unable to Pay for Housing in the Last Year: No     Homeless in the Last Year: No       Precautions:     Allergies as of 12/18/2024    (Not on File)       WOC Assessment Details/Treatment     Initial visit regarding consult for affected areas beneath bilateral breasts and pannous/abdominal folds  , which are red with satellite lesions and patient reports has had for some time. Areas assessed and left open to air. Discussed same with assigned nurse Ronit  and Dr. Dos Santos , who is to follow up with exam, new order noted for topical antifungal.        12/20/24 0830        Wound 12/20/24 0830 Rash anterior;lower Abdomen   Date First Assessed/Time First Assessed: 12/20/24 0830   Present on Original Admission: Yes  Primary Wound Type: Rash  Orientation: anterior;lower  Location: Abdomen   Wound Image    Distribution localized   Configuration/Shape vegetating lesions  (significantly erythematous area with  satelite lesions)   Borders advancing   Characteristics redness/erythema   Rash Care cleansed with;soap and water        Wound 12/20/24 0830 Rash anterior other (see comments)   Date First Assessed/Time First Assessed: 12/20/24 0830   Present on Original Admission: Yes  Primary Wound Type: Rash  Orientation: anterior  Location: (c) other (see comments)   Wound Image     Distribution localized   Groupings satellite   Borders advancing   Characteristics redness/erythema   Color red   Rash Care soap and water;antifungal applied       Recommendations made to primary team Ronit for local wound care Measures . . Orders placed.     12/20/2024

## 2024-12-20 NOTE — PLAN OF CARE
Problem: Occupational Therapy  Goal: Occupational Therapy Goal  Description: LTG: Pt will perform basic ADLs and ADL transfers with Modified independence using LRAD by discharge.    STG: to be met by 1/20/25    Pt will complete grooming standing at sink with LRAD with SBA.  Pt will complete UB dressing with SBA.  Pt will complete LB dressing with SBA using LRAD and AE prn.  Pt will complete toileting with SBA using LRAD.  Pt will complete functional mobility to/from toilet and toilet transfer with SBA using LRAD.   Outcome: Progressing

## 2024-12-20 NOTE — NURSING
Nursing supervisor, security, and ED all called requesting to look for patient belongings including walker. No items found. Supervisor to get back with me if anything is found.

## 2024-12-20 NOTE — PLAN OF CARE
Problem: Physical Therapy  Goal: Physical Therapy Goal  Description: Goals to be met by: 25     Patient will increase functional independence with mobility by performin. Supine to sit with [place]  2. Sit to supine with [place]  3. Sit to stand transfer with Modified [place]  4. Bed to chair transfer with Modified [place] using Rolling Walker  5. Gait  x 150 feet with Modified [place] using Rolling Walker.     Outcome: Progressing

## 2024-12-20 NOTE — PT/OT/SLP EVAL
Occupational Therapy  Evaluation    Name: Shyanne Cannon  MRN: [id number]  Admitting Diagnosis: slurred speech, generalized weakness   Recent Surgery: * No surgery found *      Recommendations:     Discharge therapy intensity: Low Intensity Therapy   Discharge Equipment Recommendations:  bath bench  Barriers to discharge:  Other (Comment) (ongoing medical needs)    Assessment:     Shyanne Cannon is a 74 y.o. female with a medical diagnosis of acute neurological deficits-CVA ruled out, acute hypoxemic respiratory failure, and acute encephalopathy. She is A&Ox3 and tolerated OT evaluation well. She reports living alone and requiring assist for ADLs at baseline. She reports having to go to her daughter's house for bathing. She presents with the following performance deficits affecting function: weakness, impaired endurance, impaired self care skills, impaired functional mobility, gait instability, impaired balance, decreased safety awareness, decreased lower extremity function, decreased upper extremity function. She presents up in chair upon OT entry and required CGA for functional mobility within room using rolling walker. Recommend low intensity therapy upon d/c.     Rehab Prognosis: Good; patient would benefit from acute skilled OT services to address these deficits and reach maximum level of function.       Plan:     Patient to be seen 5 x/week to address the above listed problems via self-care/home management, therapeutic activities, therapeutic exercises  Plan of Care Expires: 01/20/25  Plan of Care Reviewed with: patient    Subjective     Chief Complaint: n/a  Patient/Family Comments/goals: to return home     Occupational Profile:  Living Environment: Pt alone in an apartment on the first level. She has a tub/shower combo.   Previous level of function: Pt reports requiring some assist for ADLs prior.   Roles and Routines: mother   Equipment Used at Home: oxygen, walker, rolling  Assistance upon  Discharge: Pt will have assist from her daughter upon d/c; however, her daughter works.     Pain/Comfort:  Pain Rating 1: 0/10    Patients cultural, spiritual, Cheondoism conflicts given the current situation: no    Objective:     OT communicated with RN prior to session.      Patient was found up in chair with peripheral IV, oxygen, pulse ox (continuous), telemetry upon OT entry to room.    General Precautions: Standard,    Orthopedic Precautions: N/A  Braces: N/A    Vital Signs: Supplemental 02: nasal cannula, flow 2 L/min     Functional Mobility/Transfers:  Patient completed Sit <> Stand Transfer with contact guard assistance  with  rolling walker   Patient completed Bed <> Chair Transfer using Step Transfer technique with contact guard assistance with rolling walker  Patient completed Toilet Transfer Step Transfer technique with contact guard assistance with  rolling walker  Functional Mobility: pt ambulated within room and hallway with CGA using rolling walker.     Activities of Daily Living:  Toileting: contact guard assistance to complete toilet t/f using RW.     AMPA 6 Click ADL:  AMPAC Total Score: 20    Functional Cognition:  Orientation: oriented to Person, Place, and Time  Safety Awareness: Impaired.      Visual Perceptual Skills:  Intact    Upper Extremity Function:  Right Upper Extremity:   WFL except shoulder flexion<90*    Left Upper Extremity:  WFL except shoulder flexion<90*    Balance:   Overall CGA    Therapeutic Positioning  Risk for acquired pressure injuries is decreased due to ability to get to BSC/toilet with assist.    OT interventions performed during the course of today's session:   Therapeutic positioning was provided at the conclusion of session to offload all bony prominences for the prevention and/or reduction of pressure injuries    Skin assessment: all bony prominences were assessed    Findings:  Visible skin intact.     OT recommendations for therapeutic positioning throughout  hospitalization:   Follow [facility] Pressure Injury Prevention Protocol    Patient Education:  Patient provided with verbal education education regarding OT role/goals/POC, fall prevention, safety awareness, Discharge/DME recommendations, and pressure ulcer prevention.  Understanding was verbalized.     Patient left up in chair with all lines intact, call button in reach, and RN notified.    GOALS:   Multidisciplinary Problems       Occupational Therapy Goals          Problem: Occupational Therapy    Goal Priority Disciplines Outcome Interventions   Occupational Therapy Goal     OT, PT/OT Progressing    Description: LTG: Pt will perform basic ADLs and ADL transfers with Modified independence using LRAD by discharge.    STG: to be met by 1/20/25    Pt will complete grooming standing at sink with LRAD with SBA.  Pt will complete UB dressing with SBA.  Pt will complete LB dressing with SBA using LRAD and AE prn.  Pt will complete toileting with SBA using LRAD.  Pt will complete functional mobility to/from toilet and toilet transfer with SBA using LRAD.                        History:     No past medical history on file.    No past surgical history on file.    Time Tracking:     OT Date of Treatment: 12/20/24  OT Start Time: 1050  OT Stop Time: 1113  OT Total Time (min): 23 min    Billable Minutes:Evaluation Moderate Complexity.     12/20/2024

## 2024-12-20 NOTE — PT/OT/SLP EVAL
Physical Therapy Evaluation    Patient Name:  Shyanne Cannon   MRN:  [id number]    Recommendations:     Discharge therapy intensity: Low Intensity Therapy   Discharge Equipment Recommendations: bath bench   Barriers to discharge: Impaired mobility and Ongoing medical needs    Assessment:     Shyanne Cannon is a 74 y.o. female admitted with a medical diagnosis of acute neurological deficit -- CVA ruled out, acute hypoxemic respiratory failure, acute encephalopathy. PMHx significant for COPD and bipolar disorder; pt on home oxygen.  She presents with the following impairments/functional limitations: weakness, impaired endurance, impaired self care skills, impaired functional mobility, gait instability, decreased safety awareness.    Pt with good tolerance to PT eval. She is CGA for all functional mobility, including ambulation x 250 feet with RW. At baseline, pt is [place] with RW though reports she has been going to daughter's house to shower as she has difficulty navigating her tub. Recommendign low intensity therapy upon d/c.     Rehab Prognosis: Good; patient would benefit from acute skilled PT services to address these deficits and reach maximum level of function.    Recent Surgery: * No surgery found *      Plan:     During this hospitalization, patient would benefit from acute PT services 5 x/week to address the identified rehab impairments via gait training, therapeutic activities, therapeutic exercises, therapeutic groups and progress toward the following goals:    Plan of Care Expires:  01/20/25    Subjective     Chief Complaint: can't find her walker   Patient/Family Comments/goals: to go home  Pain/Comfort:  Pain Rating 1: 0/10    Patients cultural, spiritual, Jainism conflicts given the current situation: no    Living Environment:  Pt lives alone in first floor apartment with 0STE.   Prior to admission, patients level of function was [place] with RW.  Equipment used at home: oxygen, walker, rolling.   DME owned (not currently used): none.  Upon discharge, patient will have assistance from daughter.    Objective:     Communicated with NSG prior to session.  Patient found up in chair with peripheral IV, telemetry, pulse ox (continuous), oxygen  upon PT entry to room.    General Precautions: Standard, fall  Orthopedic Precautions:N/A   Braces: N/A  Respiratory Status: Nasal cannula, flow 2 L/min; SpO2 WNL      Exams:  RLE ROM: WFL  RLE Strength: 4+/5 grossly  LLE ROM: WFL  LLE Strength: 4+/5 grossly  Skin integrity: Visible skin intact      Functional Mobility:  Transfers:     Sit to Stand:  contact guard assistance with rolling walker  Toilet Transfer: contact guard assistance with  rolling walker  using  Step Transfer  Gait: 250 feet with RW and CGA; VC to stay close to RW; pt demos slow, step-through pattern with no overt LOB  Balance: SBA for static standing balance at RW       AM-PAC 6 CLICK MOBILITY  Total Score:18       Treatment & Education:  Patient provided with verbal education education regarding PT role/goals/POC, fall prevention, safety awareness, and discharge/DME recommendations.  Understanding was verbalized.     Patient left up in chair with all lines intact and call button in reach.    GOALS:   Multidisciplinary Problems       Physical Therapy Goals          Problem: Physical Therapy    Goal Priority Disciplines Outcome Interventions   Physical Therapy Goal     PT, PT/OT Progressing    Description: Goals to be met by: 25     Patient will increase functional independence with mobility by performin. Supine to sit with [place]  2. Sit to supine with [place]  3. Sit to stand transfer with Modified [place]  4. Bed to chair transfer with Modified [place] using Rolling Walker  5. Gait  x 150 feet with Modified [place] using Rolling Walker.                          History:     No past medical history on file.    No past surgical history on file.    Time Tracking:      PT Received On: 12/20/24  PT Start Time: 1052     PT Stop Time: 1114  PT Total Time (min): 22 min     Billable Minutes: Evaluation mod      12/20/2024

## 2024-12-21 VITALS
TEMPERATURE: 99 F | DIASTOLIC BLOOD PRESSURE: 61 MMHG | OXYGEN SATURATION: 92 % | HEIGHT: 63 IN | HEART RATE: 88 BPM | SYSTOLIC BLOOD PRESSURE: 106 MMHG | RESPIRATION RATE: 17 BRPM | WEIGHT: 192 LBS | BODY MASS INDEX: 34.02 KG/M2

## 2024-12-21 PROBLEM — R06.89 HYPERCAPNIA: Status: ACTIVE | Noted: 2024-12-21

## 2024-12-21 LAB
ANION GAP SERPL CALC-SCNC: 15 MEQ/L
BUN SERPL-MCNC: 20.2 MG/DL (ref 9.8–20.1)
CALCIUM SERPL-MCNC: 9.2 MG/DL (ref 8.4–10.2)
CHLORIDE SERPL-SCNC: 97 MMOL/L (ref 98–107)
CO2 SERPL-SCNC: 26 MMOL/L (ref 23–31)
CREAT SERPL-MCNC: 0.84 MG/DL (ref 0.55–1.02)
CREAT/UREA NIT SERPL: 24
ERYTHROCYTE [DISTWIDTH] IN BLOOD BY AUTOMATED COUNT: 14.6 % (ref 11.5–17)
GFR SERPLBLD CREATININE-BSD FMLA CKD-EPI: >60 ML/MIN/1.73/M2
GLUCOSE SERPL-MCNC: 114 MG/DL (ref 82–115)
HCT VFR BLD AUTO: 36.7 % (ref 37–47)
HGB BLD-MCNC: 11.8 G/DL (ref 12–16)
MCH RBC QN AUTO: 27 PG (ref 27–31)
MCHC RBC AUTO-ENTMCNC: 32.2 G/DL (ref 33–36)
MCV RBC AUTO: 84 FL (ref 80–94)
NRBC BLD AUTO-RTO: 0 %
PLATELET # BLD AUTO: 312 X10(3)/MCL (ref 130–400)
PMV BLD AUTO: 9.5 FL (ref 7.4–10.4)
POCT GLUCOSE: 120 MG/DL (ref 70–110)
POCT GLUCOSE: 129 MG/DL (ref 70–110)
POTASSIUM SERPL-SCNC: 4.2 MMOL/L (ref 3.5–5.1)
RBC # BLD AUTO: 4.37 X10(6)/MCL (ref 4.2–5.4)
SODIUM SERPL-SCNC: 138 MMOL/L (ref 136–145)
WBC # BLD AUTO: 10.07 X10(3)/MCL (ref 4.5–11.5)

## 2024-12-21 PROCEDURE — 85027 COMPLETE CBC AUTOMATED: CPT | Performed by: INTERNAL MEDICINE

## 2024-12-21 PROCEDURE — G0008 ADMIN INFLUENZA VIRUS VAC: HCPCS | Performed by: STUDENT IN AN ORGANIZED HEALTH CARE EDUCATION/TRAINING PROGRAM

## 2024-12-21 PROCEDURE — 90653 IIV ADJUVANT VACCINE IM: CPT | Performed by: STUDENT IN AN ORGANIZED HEALTH CARE EDUCATION/TRAINING PROGRAM

## 2024-12-21 PROCEDURE — 25000242 PHARM REV CODE 250 ALT 637 W/ HCPCS: Performed by: INTERNAL MEDICINE

## 2024-12-21 PROCEDURE — 80048 BASIC METABOLIC PNL TOTAL CA: CPT | Performed by: INTERNAL MEDICINE

## 2024-12-21 PROCEDURE — 3E02340 INTRODUCTION OF INFLUENZA VACCINE INTO MUSCLE, PERCUTANEOUS APPROACH: ICD-10-PCS | Performed by: STUDENT IN AN ORGANIZED HEALTH CARE EDUCATION/TRAINING PROGRAM

## 2024-12-21 PROCEDURE — 99900035 HC TECH TIME PER 15 MIN (STAT)

## 2024-12-21 PROCEDURE — 94760 N-INVAS EAR/PLS OXIMETRY 1: CPT

## 2024-12-21 PROCEDURE — 94640 AIRWAY INHALATION TREATMENT: CPT

## 2024-12-21 PROCEDURE — 36415 COLL VENOUS BLD VENIPUNCTURE: CPT | Performed by: INTERNAL MEDICINE

## 2024-12-21 PROCEDURE — 90471 IMMUNIZATION ADMIN: CPT | Performed by: STUDENT IN AN ORGANIZED HEALTH CARE EDUCATION/TRAINING PROGRAM

## 2024-12-21 PROCEDURE — 63600175 PHARM REV CODE 636 W HCPCS: Performed by: STUDENT IN AN ORGANIZED HEALTH CARE EDUCATION/TRAINING PROGRAM

## 2024-12-21 PROCEDURE — 99900031 HC PATIENT EDUCATION (STAT)

## 2024-12-21 PROCEDURE — 27000221 HC OXYGEN, UP TO 24 HOURS

## 2024-12-21 RX ORDER — NYSTATIN 100000 [USP'U]/G
POWDER TOPICAL 4 TIMES DAILY
Qty: 60 G | Refills: 0 | Status: SHIPPED | OUTPATIENT
Start: 2024-12-21 | End: 2025-02-19

## 2024-12-21 RX ORDER — FLUCONAZOLE 100 MG/1
100 TABLET ORAL DAILY
Qty: 10 TABLET | Refills: 0 | Status: SHIPPED | OUTPATIENT
Start: 2024-12-21 | End: 2024-12-31

## 2024-12-21 RX ADMIN — IPRATROPIUM BROMIDE AND ALBUTEROL SULFATE 3 ML: .5; 3 SOLUTION RESPIRATORY (INHALATION) at 09:12

## 2024-12-21 RX ADMIN — IPRATROPIUM BROMIDE AND ALBUTEROL SULFATE 3 ML: .5; 3 SOLUTION RESPIRATORY (INHALATION) at 12:12

## 2024-12-21 RX ADMIN — INFLUENZA A VIRUS A/VICTORIA/4897/2022 IVR-238 (H1N1) ANTIGEN (FORMALDEHYDE INACTIVATED), INFLUENZA A VIRUS A/THAILAND/8/2022 IVR-237 (H3N2) ANTIGEN (FORMALDEHYDE INACTIVATED), INFLUENZA B VIRUS B/AUSTRIA/1359417/2021 BVR-26 ANTIGEN (FORMALDEHYDE INACTIVATED) 0.5 ML: 15; 15; 15 INJECTION, SUSPENSION INTRAMUSCULAR at 04:12

## 2024-12-21 RX ADMIN — NYSTATIN: 100000 POWDER TOPICAL at 08:12

## 2024-12-21 NOTE — DISCHARGE SUMMARY
[facility] Hospital Medicine Discharge Summary    Admit Date: 12/18/2024  Discharge Date and Time: 12/21/20245:08 PM  Admitting Physician: MAGALYS Team  Discharging Physician: Kylah Pascal DO.  Primary Care Physician: Mayito Roberts MD  Consults: None    Discharge Diagnoses:  Acute encephalopathy, suspect metabolic vs toxic, resolved   Acute hypoxic/hypercapnic respiratory failure. resolved  h/o COPD, stable  h/o bipolar disorder    Hospital Course:   74-year-old female with a history significant for COPD, bipolar disorder with depression, and diabetes mellitus was sent to ED by her daughterdut to reports of new onset slurred speech, increased generalized weakness that started on 12/17.  No family was at bedside during the encounter and HPI was limited; history gathered from ED note.  On presentation she was somnolent, lethargic.  She was afebrile, hypertensive and hypoxic, requiring 2 L supplemental oxygen.  CT head without acute intracranial abnormalities, revealed chronic microvascular changes.  Lab work revealed leukocytosis, STEVE with BUN 28, serum creatinine 1.16.  UDS was positive for fentanyl.  UA was unremarkable.  Patient was put on BiPAP and admitted to hospital medicine service for further evaluation and management.  Chest x-ray was unremarkable, though ABG with a pH of 2 and a pCO2 of 63.     Patient was continued on BiPAP overnight, this morning mentation is improved she has been weaned off BiPAP and oxygenating well on room air.  Patient did have a yeast infection arrived for Questran Diflucan along with some powder.  I will prescribe per that otherwise patient was alert and oriented.  Denied any other questions at this time.  Labs and vitals remained stable.  She will follow up with her PCP outpatient      Pt was seen and examined on the day of discharge  Vitals:  VITAL SIGNS: 24 HRS MIN & MAX LAST   Temp  Min: 98.2 °F (36.8 °C)  Max: 98.6 °F (37 °C) 98.6 °F (37 °C)   BP   Min: 105/67  Max: 135/65 106/61   Pulse  Min: 86  Max: 93  88   Resp  Min: 16  Max: 18 17   SpO2  Min: 91 %  Max: 94 % (!) 92 %       Physical Exam:  GENERAL: Awake and in NAD  LUNGS: CTA anteriorly, decreased air movement in the bases, no wheezing  CVS: Normal rate  GI/: Soft, NT, bowel sounds positive.  EXTREMITIES: trace LE edema  NEURO: AAOx3  PSYCH: Cooperative    Procedures Performed: No admission procedures for hospital encounter.     Significant Diagnostic Studies: See Full reports for all details    Recent Labs   Lab 12/19/24  0423 12/20/24 0448 12/21/24 0448   WBC 14.86* 11.56* 10.07   RBC 5.16 3.98* 4.37   HGB 14.0 10.9* 11.8*   HCT 45.4 34.6* 36.7*   MCV 88.0 86.9 84.0   MCH 27.1 27.4 27.0   MCHC 30.8* 31.5* 32.2*   RDW 14.6 14.6 14.6    300 312   MPV 9.9 9.5 9.5       Recent Labs   Lab 12/18/24  2137 12/19/24  0040 12/19/24  0422 12/19/24  0500 12/19/24  0850 12/20/24 0448 12/21/24 0448     --  139  --   --  136 138   K 4.7  --  5.0  --   --  4.2 4.2     --  104  --   --  99 97*   CO2 30  --  27  --   --  26 26   BUN 28.1*  --  24.1*  --   --  29.7* 20.2*   CREATININE 1.16*  --  0.84  --   --  0.80 0.84   CALCIUM 9.5  --  9.6  --   --  8.7 9.2   PH  --  7.310*  --  7.320* 7.380  --   --    MG  --   --  1.70  --   --  1.40*  --    ALBUMIN 3.7  --  4.0  --   --  3.2*  --    ALKPHOS 66  --  75  --   --  62  --    ALT 14  --  15  --   --  12  --    AST 17  --  18  --   --  12  --    BILITOT 0.3  --  0.5  --   --  0.3  --         Microbiology Results (last 7 days)       ** No results found for the last 168 hours. **             MRI Brain Without Contrast  Narrative: EXAMINATION:  MRI BRAIN WITHOUT CONTRAST    CLINICAL HISTORY:  AMS;    TECHNIQUE:  Multiplanar multisequence MR imaging of the brain was performed without contrast.    COMPARISON:  CT scan dated 12/18/2024    FINDINGS:  No intracranial mass or lesion is seen.  No hemorrhage is seen.  No acute infarct is seen.  No  restricted diffusion abnormality is seen.  There is diffuse cerebral atrophy seen along with some compensatory ventricular dilatation and periventricular white matter change consistent with patient's age.  Posterior fossa appears normal.  Calvarium is intact.  Paranasal sinuses appear grossly unremarkable.  Impression: Chronic age related changes    Otherwise unremarkable    Electronically signed by: Derik Estes  Date:    12/19/2024  Time:    17:44  X-Ray Chest 1 View  Narrative: EXAMINATION:  XR CHEST 1 VIEW    CLINICAL HISTORY:  Shortness of breadth;    COMPARISON:  25 November 2020    FINDINGS:  Frontal view of the chest was obtained. The heart is not enlarged.  There is no new focal consolidation or pneumothorax.  Impression: No acute findings.    Electronically signed by: Srinivasan Bautista  Date:    12/19/2024  Time:    14:41  CT Head Without Contrast  EXAMINATION  CT HEAD WITHOUT CONTRAST    CLINICAL HISTORY  Mental status change, unknown cause;    TECHNIQUE  Axial non-contrast CT images of the head were acquired and multiplanar reconstructions accomplished by a CT technologist at a separate workstation, pushed to PACS for physician review.    COMPARISON  23 September 2020    FINDINGS  Images were reviewed in subdural, brain, soft tissue, and bone windows.    Exam quality: Motion/streak artifact limits assessment of the posterior fossa.    Hemorrhage: No evidence of acute hyperattenuating blood products.    Parenchyma: There is diffuse bilateral supratentorial white matter hypoattenuation, typical of chronic microvascular changes. No discrete mass, mass effect, or CT evidence of acute large vascular territory insult. Gray-white differentiation is preserved. No midline shift is evident.    CSF spaces: Proportional appearance of ventricular and sulcal enlargement. No hydrocephalus. No masses or fluid collections.    Other findings: No focally hyperdense artery. Scattered carotid siphon calcifications are present.  No abnormal densities within the dural sinuses. No abnormalities of the scalp or subjacent osseous structures. Mastoids are well aerated. No focal abnormality of the sella. The included facial structures are unremarkable.    IMPRESSION  1. No convincing acute intracranial abnormality.  2. Additional secondary details discussed above, relatively unchanged from the comparison CT appearance.  ==========    No significant discrepancy identified in relation to the teleradiology preliminary report.    RADIATION DOSE  Automated tube current modulation, weight-based exposure dosing, and/or iterative reconstruction technique utilized to reach lowest reasonably achievable exposure rate.    DLP: 876 mGy*cm    Electronically signed by: Froy Thornton  Date:    12/19/2024  Time:    07:18         Medication List        START taking these medications      fluconazole 100 MG tablet  Commonly known as: DIFLUCAN  Take 1 tablet (100 mg total) by mouth once daily. for 10 days     nystatin powder  Commonly known as: MYCOSTATIN  Apply topically 4 (four) times daily.               Where to Get Your Medications        These medications were sent to [facility]/pharmacy #[id number] [address] AT [address] [address]      Phone: [phone]   fluconazole 100 MG tablet  nystatin powder          Explained in detail to the patient about the discharge plan, medications, and follow-up visits. Pt understands and agrees with the treatment plan  Discharge Disposition:  home  Discharged Condition: stable  Diet-   Dietary Orders (From admission, onward)       Start     Ordered    12/19/24 0403  Diet Heart Healthy Diabetic; 2000 Calories (up to 75 gm per meal)  (Diet/Nutrition - [facility])  Diet effective now        Question Answer Comment   Diet Modifier: Diabetic    Total calories / carbs: 2000 Calories (up to 75 gm per meal)        12/19/24 0403                   Medications Per [facility] med  rec  Activities as tolerated   Follow-up Information       Mayito Roberts MD. Schedule an appointment as soon as possible for a visit in 1 week(s).    Specialty: Family Medicine  Contact information:  [address] [place] [address]  [phone]                           For further questions contact hospitalist office    Discharge time 33 minutes    For worsening symptoms, chest pain, shortness of breath, increased abdominal pain, high grade fever, stroke or stroke like symptoms, immediately go to the nearest Emergency Room or call 911 as soon as possible.      Kylah Pascal DO  Department of Hospital Medicine  [facility]  12/21/2024

## 2024-12-26 ENCOUNTER — PATIENT OUTREACH (OUTPATIENT)
Dept: ADMINISTRATIVE | Facility: CLINIC | Age: 74
End: 2024-12-26
Payer: MEDICARE

## 2024-12-26 NOTE — PROGRESS NOTES
C3 nurse attempted to contact hSyanne Cannon for a TCC post hospital discharge follow up call. No answer. Voicemail left.  The patient does not have a scheduled HOSFU appointment, and the pt does not have an [facility] PCP.

## 2024-12-27 NOTE — PROGRESS NOTES
3rd attempt-C3 nurse attempted to contact Shyanne Cannon for a TCC post hospital discharge follow up call. No answer. Voicemail left.  The patient does not have a scheduled HOSFU appointment, and the pt does not have an [facility] PCP.

## 2024-12-27 NOTE — PROGRESS NOTES
2nd attempt-C3 nurse attempted to contact Shyanne Cannon for a TCC post hospital discharge follow up call. No answer. Voicemail left.  The patient does not have a scheduled HOSFU appointment, and the pt does not have an [facility] PCP.

## 2025-04-14 ENCOUNTER — TELEPHONE (OUTPATIENT)
Dept: BEHAVIORAL HEALTH | Facility: CLINIC | Age: 75
End: 2025-04-14
Payer: MEDICARE

## 2025-04-14 NOTE — TELEPHONE ENCOUNTER
Called [facility] to inform them of the message below per patient  Spoke to Liana.  She stated she would inform the physician.  Call ended  Gillian Julian, JEAN  4/14/2025, 1:56 PM

## 2025-04-14 NOTE — TELEPHONE ENCOUNTER
"Spoke to patient stated she needed an appointment after 3:00 because her daughter is her only means of transportation. She works in [place] and would have to travel back to [place] to pick her up for the appointment.  Informed her that there were no new patient appts after 3 the latest appointment was at 1:00  Pt said she would make an appointment with psych (Josesito) at [facility], that is where her daughter works. "That would be my better option".  Instructed pt to call back if that appt at [facility] did not work.  Pt verbalized understanding  Call ended  Gillian Julian CMA  4/14/2025, 1:43 PM     "

## 2025-05-07 ENCOUNTER — HOSPITAL ENCOUNTER (INPATIENT)
Facility: HOSPITAL | Age: 75
LOS: 12 days | Discharge: NURSING, REHAB OR LONG-TERM CARE | DRG: 177 | End: 2025-05-19
Attending: STUDENT IN AN ORGANIZED HEALTH CARE EDUCATION/TRAINING PROGRAM | Admitting: STUDENT IN AN ORGANIZED HEALTH CARE EDUCATION/TRAINING PROGRAM
Payer: MEDICARE

## 2025-05-07 DIAGNOSIS — T79.6XXA TRAUMATIC RHABDOMYOLYSIS, INITIAL ENCOUNTER: ICD-10-CM

## 2025-05-07 DIAGNOSIS — J18.9 PNEUMONIA DUE TO INFECTIOUS ORGANISM, UNSPECIFIED LATERALITY, UNSPECIFIED PART OF LUNG: Primary | ICD-10-CM

## 2025-05-07 DIAGNOSIS — D72.829 LEUKOCYTOSIS, UNSPECIFIED TYPE: ICD-10-CM

## 2025-05-07 DIAGNOSIS — R53.1 GENERALIZED WEAKNESS: ICD-10-CM

## 2025-05-07 DIAGNOSIS — R07.9 CHEST PAIN: ICD-10-CM

## 2025-05-07 LAB
ABS NEUT (OLG): 30.06 X10(3)/MCL (ref 2.1–9.2)
ALBUMIN SERPL-MCNC: 3.1 G/DL (ref 3.4–4.8)
ALBUMIN/GLOB SERPL: 0.9 RATIO (ref 1.1–2)
ALP SERPL-CCNC: 96 UNIT/L (ref 40–150)
ALT SERPL-CCNC: 19 UNIT/L (ref 0–55)
ANION GAP SERPL CALC-SCNC: 11 MEQ/L
APTT PPP: 32.3 SECONDS (ref 23.2–33.7)
AST SERPL-CCNC: 44 UNIT/L (ref 11–45)
BILIRUB SERPL-MCNC: 0.4 MG/DL
BUN SERPL-MCNC: 24.9 MG/DL (ref 9.8–20.1)
CALCIUM SERPL-MCNC: 9.8 MG/DL (ref 8.4–10.2)
CHLORIDE SERPL-SCNC: 93 MMOL/L (ref 98–107)
CK SERPL-CCNC: 1443 U/L (ref 29–168)
CO2 SERPL-SCNC: 31 MMOL/L (ref 23–31)
CREAT SERPL-MCNC: 1.07 MG/DL (ref 0.55–1.02)
CREAT/UREA NIT SERPL: 23
ERYTHROCYTE [DISTWIDTH] IN BLOOD BY AUTOMATED COUNT: 15.3 % (ref 11.5–17)
FLUAV AG UPPER RESP QL IA.RAPID: NOT DETECTED
FLUBV AG UPPER RESP QL IA.RAPID: NOT DETECTED
GFR SERPLBLD CREATININE-BSD FMLA CKD-EPI: 55 ML/MIN/1.73/M2
GLOBULIN SER-MCNC: 3.5 GM/DL (ref 2.4–3.5)
GLUCOSE SERPL-MCNC: 131 MG/DL (ref 82–115)
HCT VFR BLD AUTO: 38.4 % (ref 37–47)
HGB BLD-MCNC: 12 G/DL (ref 12–16)
INR PPP: 1.2
INSTRUMENT WBC (OLG): 33.77 X10(3)/MCL
LACTATE SERPL-SCNC: 1.7 MMOL/L (ref 0.5–2.2)
LYMPHOCYTES NFR BLD MANUAL: 1.35 X10(3)/MCL (ref 0.6–4.6)
LYMPHOCYTES NFR BLD MANUAL: 4 %
MCH RBC QN AUTO: 26.8 PG (ref 27–31)
MCHC RBC AUTO-ENTMCNC: 31.3 G/DL (ref 33–36)
MCV RBC AUTO: 85.7 FL (ref 80–94)
METAMYELOCYTES NFR BLD MANUAL: 3 %
MONOCYTES NFR BLD MANUAL: 1.35 X10(3)/MCL (ref 0.1–1.3)
MONOCYTES NFR BLD MANUAL: 4 %
NEUTROPHILS NFR BLD MANUAL: 89 %
PLATELET # BLD AUTO: 317 X10(3)/MCL (ref 130–400)
PLATELET # BLD EST: NORMAL 10*3/UL
PMV BLD AUTO: 9.5 FL (ref 7.4–10.4)
POTASSIUM SERPL-SCNC: 4.3 MMOL/L (ref 3.5–5.1)
PROT SERPL-MCNC: 6.6 GM/DL (ref 5.8–7.6)
PROTHROMBIN TIME: 14.5 SECONDS (ref 12.5–14.5)
RBC # BLD AUTO: 4.48 X10(6)/MCL (ref 4.2–5.4)
RBC MORPH BLD: NORMAL
SARS-COV-2 RNA RESP QL NAA+PROBE: NOT DETECTED
SODIUM SERPL-SCNC: 135 MMOL/L (ref 136–145)
WBC # BLD AUTO: 33.77 X10(3)/MCL (ref 4.5–11.5)

## 2025-05-07 PROCEDURE — 83605 ASSAY OF LACTIC ACID: CPT | Performed by: STUDENT IN AN ORGANIZED HEALTH CARE EDUCATION/TRAINING PROGRAM

## 2025-05-07 PROCEDURE — 84484 ASSAY OF TROPONIN QUANT: CPT

## 2025-05-07 PROCEDURE — 0240U COVID/FLU A&B PCR: CPT | Performed by: STUDENT IN AN ORGANIZED HEALTH CARE EDUCATION/TRAINING PROGRAM

## 2025-05-07 PROCEDURE — 11000001 HC ACUTE MED/SURG PRIVATE ROOM

## 2025-05-07 PROCEDURE — 25000003 PHARM REV CODE 250: Performed by: STUDENT IN AN ORGANIZED HEALTH CARE EDUCATION/TRAINING PROGRAM

## 2025-05-07 PROCEDURE — 85610 PROTHROMBIN TIME: CPT | Performed by: STUDENT IN AN ORGANIZED HEALTH CARE EDUCATION/TRAINING PROGRAM

## 2025-05-07 PROCEDURE — 85007 BL SMEAR W/DIFF WBC COUNT: CPT | Performed by: STUDENT IN AN ORGANIZED HEALTH CARE EDUCATION/TRAINING PROGRAM

## 2025-05-07 PROCEDURE — 87040 BLOOD CULTURE FOR BACTERIA: CPT | Performed by: STUDENT IN AN ORGANIZED HEALTH CARE EDUCATION/TRAINING PROGRAM

## 2025-05-07 PROCEDURE — 87641 MR-STAPH DNA AMP PROBE: CPT | Performed by: STUDENT IN AN ORGANIZED HEALTH CARE EDUCATION/TRAINING PROGRAM

## 2025-05-07 PROCEDURE — 96375 TX/PRO/DX INJ NEW DRUG ADDON: CPT

## 2025-05-07 PROCEDURE — 96361 HYDRATE IV INFUSION ADD-ON: CPT

## 2025-05-07 PROCEDURE — 99285 EMERGENCY DEPT VISIT HI MDM: CPT | Mod: 25

## 2025-05-07 PROCEDURE — 63600175 PHARM REV CODE 636 W HCPCS: Performed by: STUDENT IN AN ORGANIZED HEALTH CARE EDUCATION/TRAINING PROGRAM

## 2025-05-07 PROCEDURE — 85730 THROMBOPLASTIN TIME PARTIAL: CPT | Performed by: STUDENT IN AN ORGANIZED HEALTH CARE EDUCATION/TRAINING PROGRAM

## 2025-05-07 PROCEDURE — 82550 ASSAY OF CK (CPK): CPT | Performed by: STUDENT IN AN ORGANIZED HEALTH CARE EDUCATION/TRAINING PROGRAM

## 2025-05-07 PROCEDURE — 25500020 PHARM REV CODE 255: Performed by: STUDENT IN AN ORGANIZED HEALTH CARE EDUCATION/TRAINING PROGRAM

## 2025-05-07 PROCEDURE — 80053 COMPREHEN METABOLIC PANEL: CPT | Performed by: STUDENT IN AN ORGANIZED HEALTH CARE EDUCATION/TRAINING PROGRAM

## 2025-05-07 PROCEDURE — 96365 THER/PROPH/DIAG IV INF INIT: CPT

## 2025-05-07 RX ORDER — IBUPROFEN 200 MG
24 TABLET ORAL
Status: DISCONTINUED | OUTPATIENT
Start: 2025-05-08 | End: 2025-05-19 | Stop reason: HOSPADM

## 2025-05-07 RX ORDER — CEFEPIME HYDROCHLORIDE 2 G/1
2 INJECTION, POWDER, FOR SOLUTION INTRAVENOUS
Status: COMPLETED | OUTPATIENT
Start: 2025-05-07 | End: 2025-05-07

## 2025-05-07 RX ORDER — TALC
6 POWDER (GRAM) TOPICAL NIGHTLY PRN
Status: DISCONTINUED | OUTPATIENT
Start: 2025-05-08 | End: 2025-05-19 | Stop reason: HOSPADM

## 2025-05-07 RX ORDER — CEFEPIME HYDROCHLORIDE 1 G/1
1 INJECTION, POWDER, FOR SOLUTION INTRAMUSCULAR; INTRAVENOUS
Status: DISCONTINUED | OUTPATIENT
Start: 2025-05-08 | End: 2025-05-12

## 2025-05-07 RX ORDER — SODIUM CHLORIDE 0.9 % (FLUSH) 0.9 %
10 SYRINGE (ML) INJECTION
Status: DISCONTINUED | OUTPATIENT
Start: 2025-05-08 | End: 2025-05-19 | Stop reason: HOSPADM

## 2025-05-07 RX ORDER — GLUCAGON 1 MG
1 KIT INJECTION
Status: DISCONTINUED | OUTPATIENT
Start: 2025-05-08 | End: 2025-05-19 | Stop reason: HOSPADM

## 2025-05-07 RX ORDER — MUPIROCIN 20 MG/G
OINTMENT TOPICAL 2 TIMES DAILY
Status: COMPLETED | OUTPATIENT
Start: 2025-05-08 | End: 2025-05-12

## 2025-05-07 RX ORDER — ACETAMINOPHEN 325 MG/1
650 TABLET ORAL EVERY 4 HOURS PRN
Status: DISCONTINUED | OUTPATIENT
Start: 2025-05-08 | End: 2025-05-19 | Stop reason: HOSPADM

## 2025-05-07 RX ORDER — IBUPROFEN 200 MG
16 TABLET ORAL
Status: DISCONTINUED | OUTPATIENT
Start: 2025-05-08 | End: 2025-05-19 | Stop reason: HOSPADM

## 2025-05-07 RX ORDER — POLYETHYLENE GLYCOL 3350 17 G/17G
17 POWDER, FOR SOLUTION ORAL 2 TIMES DAILY PRN
Status: DISCONTINUED | OUTPATIENT
Start: 2025-05-08 | End: 2025-05-08

## 2025-05-07 RX ORDER — IPRATROPIUM BROMIDE AND ALBUTEROL SULFATE 2.5; .5 MG/3ML; MG/3ML
3 SOLUTION RESPIRATORY (INHALATION) EVERY 4 HOURS PRN
Status: DISCONTINUED | OUTPATIENT
Start: 2025-05-08 | End: 2025-05-19 | Stop reason: HOSPADM

## 2025-05-07 RX ORDER — ALUMINUM HYDROXIDE, MAGNESIUM HYDROXIDE, AND SIMETHICONE 1200; 120; 1200 MG/30ML; MG/30ML; MG/30ML
30 SUSPENSION ORAL 4 TIMES DAILY PRN
Status: DISCONTINUED | OUTPATIENT
Start: 2025-05-08 | End: 2025-05-19 | Stop reason: HOSPADM

## 2025-05-07 RX ORDER — BISACODYL 10 MG/1
10 SUPPOSITORY RECTAL DAILY PRN
Status: DISCONTINUED | OUTPATIENT
Start: 2025-05-08 | End: 2025-05-19 | Stop reason: HOSPADM

## 2025-05-07 RX ORDER — AZTREONAM 2 G/1
2000 INJECTION, POWDER, LYOPHILIZED, FOR SOLUTION INTRAMUSCULAR; INTRAVENOUS
Status: DISCONTINUED | OUTPATIENT
Start: 2025-05-07 | End: 2025-05-07

## 2025-05-07 RX ADMIN — VANCOMYCIN HYDROCHLORIDE 1000 MG: 1 INJECTION, POWDER, LYOPHILIZED, FOR SOLUTION INTRAVENOUS at 10:05

## 2025-05-07 RX ADMIN — IOHEXOL 100 ML: 350 INJECTION, SOLUTION INTRAVENOUS at 09:05

## 2025-05-07 RX ADMIN — CEFEPIME 2 G: 2 INJECTION, POWDER, FOR SOLUTION INTRAVENOUS at 10:05

## 2025-05-07 RX ADMIN — SODIUM CHLORIDE 1000 ML: 9 INJECTION, SOLUTION INTRAVENOUS at 09:05

## 2025-05-08 LAB
ALBUMIN SERPL-MCNC: 2.8 G/DL (ref 3.4–4.8)
ALBUMIN/GLOB SERPL: 0.9 RATIO (ref 1.1–2)
ALP SERPL-CCNC: 91 UNIT/L (ref 40–150)
ALT SERPL-CCNC: 17 UNIT/L (ref 0–55)
ANION GAP SERPL CALC-SCNC: 7 MEQ/L
AST SERPL-CCNC: 35 UNIT/L (ref 11–45)
BACTERIA #/AREA URNS AUTO: ABNORMAL /HPF
BASOPHILS # BLD AUTO: 0.09 X10(3)/MCL
BASOPHILS NFR BLD AUTO: 0.4 %
BILIRUB SERPL-MCNC: 0.4 MG/DL
BILIRUB UR QL STRIP.AUTO: NEGATIVE
BUN SERPL-MCNC: 23.1 MG/DL (ref 9.8–20.1)
CALCIUM SERPL-MCNC: 9.5 MG/DL (ref 8.4–10.2)
CHLORIDE SERPL-SCNC: 95 MMOL/L (ref 98–107)
CLARITY UR: CLEAR
CO2 SERPL-SCNC: 30 MMOL/L (ref 23–31)
COLOR UR AUTO: ABNORMAL
CREAT SERPL-MCNC: 0.81 MG/DL (ref 0.55–1.02)
CREAT/UREA NIT SERPL: 29
EOSINOPHIL # BLD AUTO: 0.29 X10(3)/MCL (ref 0–0.9)
EOSINOPHIL NFR BLD AUTO: 1.3 %
ERYTHROCYTE [DISTWIDTH] IN BLOOD BY AUTOMATED COUNT: 15.3 % (ref 11.5–17)
GFR SERPLBLD CREATININE-BSD FMLA CKD-EPI: >60 ML/MIN/1.73/M2
GLOBULIN SER-MCNC: 3.2 GM/DL (ref 2.4–3.5)
GLUCOSE SERPL-MCNC: 99 MG/DL (ref 82–115)
GLUCOSE UR QL STRIP: NORMAL
HCT VFR BLD AUTO: 35 % (ref 37–47)
HGB BLD-MCNC: 10.9 G/DL (ref 12–16)
HGB UR QL STRIP: NEGATIVE
IMM GRANULOCYTES # BLD AUTO: 0.56 X10(3)/MCL (ref 0–0.04)
IMM GRANULOCYTES NFR BLD AUTO: 2.4 %
KETONES UR QL STRIP: NEGATIVE
LEUKOCYTE ESTERASE UR QL STRIP: NEGATIVE
LYMPHOCYTES # BLD AUTO: 1.82 X10(3)/MCL (ref 0.6–4.6)
LYMPHOCYTES NFR BLD AUTO: 7.9 %
MAGNESIUM SERPL-MCNC: 1.6 MG/DL (ref 1.6–2.6)
MCH RBC QN AUTO: 26.9 PG (ref 27–31)
MCHC RBC AUTO-ENTMCNC: 31.1 G/DL (ref 33–36)
MCV RBC AUTO: 86.4 FL (ref 80–94)
MONOCYTES # BLD AUTO: 1.2 X10(3)/MCL (ref 0.1–1.3)
MONOCYTES NFR BLD AUTO: 5.2 %
MRSA PCR SCRN (OHS): NOT DETECTED
MUCOUS THREADS URNS QL MICRO: ABNORMAL /LPF
NEUTROPHILS # BLD AUTO: 19.17 X10(3)/MCL (ref 2.1–9.2)
NEUTROPHILS NFR BLD AUTO: 82.8 %
NITRITE UR QL STRIP: NEGATIVE
NRBC BLD AUTO-RTO: 0 %
PH UR STRIP: 6.5 [PH]
PLATELET # BLD AUTO: 294 X10(3)/MCL (ref 130–400)
PMV BLD AUTO: 9.7 FL (ref 7.4–10.4)
POTASSIUM SERPL-SCNC: 4 MMOL/L (ref 3.5–5.1)
PROT SERPL-MCNC: 6 GM/DL (ref 5.8–7.6)
PROT UR QL STRIP: NEGATIVE
RBC # BLD AUTO: 4.05 X10(6)/MCL (ref 4.2–5.4)
RBC #/AREA URNS AUTO: ABNORMAL /HPF
SODIUM SERPL-SCNC: 132 MMOL/L (ref 136–145)
SP GR UR STRIP.AUTO: 1.02 (ref 1–1.03)
SQUAMOUS #/AREA URNS LPF: ABNORMAL /HPF
TROPONIN I SERPL-MCNC: 0.01 NG/ML (ref 0–0.04)
TROPONIN I SERPL-MCNC: <0.01 NG/ML (ref 0–0.04)
TROPONIN I SERPL-MCNC: <0.01 NG/ML (ref 0–0.04)
UROBILINOGEN UR STRIP-ACNC: NORMAL
VANCOMYCIN SERPL-MCNC: 14.2 UG/ML (ref 15–20)
WBC # BLD AUTO: 23.13 X10(3)/MCL (ref 4.5–11.5)
WBC #/AREA URNS AUTO: ABNORMAL /HPF

## 2025-05-08 PROCEDURE — 81001 URINALYSIS AUTO W/SCOPE: CPT | Performed by: STUDENT IN AN ORGANIZED HEALTH CARE EDUCATION/TRAINING PROGRAM

## 2025-05-08 PROCEDURE — 21400001 HC TELEMETRY ROOM

## 2025-05-08 PROCEDURE — 99900035 HC TECH TIME PER 15 MIN (STAT)

## 2025-05-08 PROCEDURE — 25000003 PHARM REV CODE 250: Performed by: STUDENT IN AN ORGANIZED HEALTH CARE EDUCATION/TRAINING PROGRAM

## 2025-05-08 PROCEDURE — 25000003 PHARM REV CODE 250

## 2025-05-08 PROCEDURE — 36415 COLL VENOUS BLD VENIPUNCTURE: CPT

## 2025-05-08 PROCEDURE — 92611 MOTION FLUOROSCOPY/SWALLOW: CPT

## 2025-05-08 PROCEDURE — 92610 EVALUATE SWALLOWING FUNCTION: CPT

## 2025-05-08 PROCEDURE — 25000242 PHARM REV CODE 250 ALT 637 W/ HCPCS: Performed by: STUDENT IN AN ORGANIZED HEALTH CARE EDUCATION/TRAINING PROGRAM

## 2025-05-08 PROCEDURE — 83735 ASSAY OF MAGNESIUM: CPT

## 2025-05-08 PROCEDURE — 63600175 PHARM REV CODE 636 W HCPCS

## 2025-05-08 PROCEDURE — 63600175 PHARM REV CODE 636 W HCPCS: Performed by: STUDENT IN AN ORGANIZED HEALTH CARE EDUCATION/TRAINING PROGRAM

## 2025-05-08 PROCEDURE — 84484 ASSAY OF TROPONIN QUANT: CPT

## 2025-05-08 PROCEDURE — 27000221 HC OXYGEN, UP TO 24 HOURS

## 2025-05-08 PROCEDURE — 11000001 HC ACUTE MED/SURG PRIVATE ROOM

## 2025-05-08 PROCEDURE — 85025 COMPLETE CBC W/AUTO DIFF WBC: CPT

## 2025-05-08 PROCEDURE — 80053 COMPREHEN METABOLIC PANEL: CPT

## 2025-05-08 PROCEDURE — 80202 ASSAY OF VANCOMYCIN: CPT | Performed by: STUDENT IN AN ORGANIZED HEALTH CARE EDUCATION/TRAINING PROGRAM

## 2025-05-08 RX ORDER — SENNOSIDES 8.6 MG/1
8.6 TABLET ORAL DAILY
Status: DISCONTINUED | OUTPATIENT
Start: 2025-05-08 | End: 2025-05-19 | Stop reason: HOSPADM

## 2025-05-08 RX ORDER — NYSTATIN AND TRIAMCINOLONE ACETONIDE 100000; 1 [USP'U]/G; MG/G
CREAM TOPICAL 3 TIMES DAILY
Status: DISCONTINUED | OUTPATIENT
Start: 2025-05-08 | End: 2025-05-19 | Stop reason: HOSPADM

## 2025-05-08 RX ORDER — VANCOMYCIN HCL IN 5 % DEXTROSE 1G/250ML
1000 PLASTIC BAG, INJECTION (ML) INTRAVENOUS
Status: DISCONTINUED | OUTPATIENT
Start: 2025-05-08 | End: 2025-05-08

## 2025-05-08 RX ORDER — POLYETHYLENE GLYCOL 3350 17 G/17G
17 POWDER, FOR SOLUTION ORAL DAILY
Status: DISCONTINUED | OUTPATIENT
Start: 2025-05-08 | End: 2025-05-19 | Stop reason: HOSPADM

## 2025-05-08 RX ORDER — FLUTICASONE FUROATE AND VILANTEROL 100; 25 UG/1; UG/1
1 POWDER RESPIRATORY (INHALATION) DAILY
Status: DISCONTINUED | OUTPATIENT
Start: 2025-05-08 | End: 2025-05-19 | Stop reason: HOSPADM

## 2025-05-08 RX ADMIN — Medication 6 MG: at 09:05

## 2025-05-08 RX ADMIN — MUPIROCIN: 20 OINTMENT TOPICAL at 09:05

## 2025-05-08 RX ADMIN — FLUTICASONE FUROATE AND VILANTEROL TRIFENATATE 1 PUFF: 100; 25 POWDER RESPIRATORY (INHALATION) at 11:05

## 2025-05-08 RX ADMIN — NYSTATIN AND TRIAMCINOLONE ACETONIDE: 100000; 1 CREAM TOPICAL at 09:05

## 2025-05-08 RX ADMIN — VANCOMYCIN HYDROCHLORIDE 1000 MG: 1 INJECTION, POWDER, LYOPHILIZED, FOR SOLUTION INTRAVENOUS at 12:05

## 2025-05-08 RX ADMIN — CEFEPIME 1 G: 1 INJECTION, POWDER, FOR SOLUTION INTRAMUSCULAR; INTRAVENOUS at 09:05

## 2025-05-08 RX ADMIN — VANCOMYCIN HYDROCHLORIDE 750 MG: 750 INJECTION, POWDER, LYOPHILIZED, FOR SOLUTION INTRAVENOUS at 01:05

## 2025-05-08 RX ADMIN — SENNOSIDES 8.6 MG: 8.6 TABLET, FILM COATED ORAL at 10:05

## 2025-05-08 RX ADMIN — POLYETHYLENE GLYCOL 3350 17 G: 17 POWDER, FOR SOLUTION ORAL at 10:05

## 2025-05-08 RX ADMIN — CEFEPIME 1 G: 1 INJECTION, POWDER, FOR SOLUTION INTRAMUSCULAR; INTRAVENOUS at 03:05

## 2025-05-08 RX ADMIN — CEFEPIME 1 G: 1 INJECTION, POWDER, FOR SOLUTION INTRAMUSCULAR; INTRAVENOUS at 04:05

## 2025-05-08 RX ADMIN — NYSTATIN AND TRIAMCINOLONE ACETONIDE: 100000; 1 CREAM TOPICAL at 04:05

## 2025-05-08 NOTE — PT/OT/SLP PROGRESS
Physical Therapy      Patient Name:  Shyanne Cannon   MRN:  [id number]    PT orders received, chart reviewed. Patient not seen today secondary to off floor for xray. Will follow-up as schedule allows and pt is agreeable/appropriate.    5/8/2025     PAST SURGICAL HISTORY:  No significant past surgical history

## 2025-05-08 NOTE — PT/OT/SLP PROGRESS
Occupational Therapy      Patient Name:  Shyanne Cannon   MRN:  [id number]    OT evaluation orders received. Patient not seen today secondary to off the floor for xray. Will follow-up as schedule permits.    5/8/2025

## 2025-05-08 NOTE — CONSULTS
[facility] [address]  Wound Care    Patient Name:  Shyanne Cannon   MRN:  [id number]  Date: 5/8/2025  Diagnosis: <principal problem not specified>    History:     No past medical history on file.    Social History[1]    Precautions:     Allergies as of 05/07/2025 - Reviewed 05/07/2025   Allergen Reaction Noted    Cephalexin Hives 10/09/2015    Levofloxacin Hives, Other (See Comments), and Rash 10/09/2015       WO Assessment Details/Treatment     Initial visit regarding consult for affected area over sacrum, noting perianal/gluteal cleft and sacral area reddened suggestive of IAD and or candidiasis. CNA at bedside at this time performing am care, discussed status and plan of care regarding affected  area with same. Patient is currently resting on a pressure redistribution mattress and demonstrated ability to mobilize self well in bed to off load her bony prominences.          05/08/25 0830        Wound 05/08/25 0310 Pressure Injury Perirectal   Date First Assessed/Time First Assessed: 05/08/25 0310   Primary Wound Type: Pressure Injury  Location: Perirectal   Wound Image    Pressure Injury Stage   (suggestive of IAD, such as candidiasis)   Dressing Appearance Open to air   Drainage Amount None   Appearance Red   Tissue loss description Not applicable   Periwound Area Intact   Wound Edges Undefined   Care Soap and water  (discussed status and plan of care with assigned CNA at bedside)   Periwound Care Dry periwound area maintained   Off Loading Wedge  (demonstrated ability to mobilize self well in bed to off load her bony prominences.)       Recommendations made to primary team Omid FALCON for local wound and skin care measures and pressure injury prevention measures. Orders placed.     05/08/2025         [1]   Social History  Socioeconomic History    Marital status: Single     Social Drivers of Health     Financial Resource Strain: Low Risk  (12/19/2024)    Overall Financial Resource  Strain (CARDIA)     Difficulty of Paying Living Expenses: Not hard at all   Food Insecurity: No Food Insecurity (12/19/2024)    Hunger Vital Sign     Worried About Running Out of Food in the Last Year: Never true     Ran Out of Food in the Last Year: Never true   Transportation Needs: No Transportation Needs (12/19/2024)    TRANSPORTATION NEEDS     Transportation : No   Physical Activity: Inactive (5/8/2025)    Exercise Vital Sign     Days of Exercise per Week: 0 days     Minutes of Exercise per Session: 0 min   Stress: No Stress Concern Present (12/19/2024)    [employer] [place] [employer] - Occupational Stress Questionnaire     Feeling of Stress : Not at all   Housing Stability: Unknown (12/19/2024)    Housing Stability Vital Sign     Unable to Pay for Housing in the Last Year: No     Homeless in the Last Year: No

## 2025-05-08 NOTE — PLAN OF CARE
05/08/25 0948   Discharge Assessment   Assessment Type Discharge Planning Assessment   Confirmed/corrected address, phone number and insurance Yes   Confirmed Demographics Correct on Facesheet   Source of Information patient;family   When was your last doctors appointment?   (unsure)   Communicated CORNELL with patient/caregiver Date not available/Unable to determine   Reason For Admission Sepsis   People in Home alone   Facility Arrived From: Home   Do you expect to return to your current living situation? Yes   Do you have help at home or someone to help you manage your care at home? Yes   Who are your caregiver(s) and their phone number(s)? Cici Hobbs [phone]   Prior to hospitilization cognitive status: Unable to Assess   Current cognitive status: Unable to Assess   Walking or Climbing Stairs Difficulty yes   Walking or Climbing Stairs ambulation difficulty, requires equipment   Mobility Management RW for ambulation   Dressing/Bathing Difficulty yes   Dressing/Bathing bathing difficulty, requires equipment;bathing difficulty, assistance 1 person   Dressing/Bathing Management Shower chair and assist with all ADL's   Home Accessibility wheelchair accessible   Home Layout Able to live on 1st floor   Equipment Currently Used at Home walker, rolling;shower chair;oxygen;grab bar  ([employer])   Patient currently being followed by outpatient case management? Yes   If yes, name of outpatient case management following: other (comments)  (cannot recall name.  Unable to locate in chart)   Do you currently have service(s) that help you manage your care at home? No   Do you take prescription medications? Yes   Do you have prescription coverage? Yes   Coverage [employer]   Do you have any problems affording any of your prescribed medications? No   Is the patient taking medications as prescribed? yes   Who is going to help you get home at discharge? Cici Hobbs   How do you get to doctors  appointments? family or friend will provide   Are you on dialysis? No   Do you take coumadin? No   Discharge Plan A Home Health   Discharge Plan B Skilled Nursing Facility   DME Needed Upon Discharge  none   Discharge Plan discussed with: Patient;Adult children   Transition of Care Barriers None   Physical Activity   On average, how many days per week do you engage in moderate to strenuous exercise (like a brisk walk)? 0 days   On average, how many minutes do you engage in exercise at this level? 0 min   Social Isolation   How often do you feel lonely or isolated from those around you?  Patient unable to answer   Alcohol Use   Q1: How often do you have a drink containing alcohol? Never   Q2: How many drinks containing alcohol do you have on a typical day when you are drinking? None   Q3: How often do you have six or more drinks on one occasion? Never   Health Literacy   How often do you need to have someone help you when you read instructions, pamphlets, or other written material from your doctor or pharmacy? Sometimes     Assessment completed at bedside and over the phone with daughter, Cici.  Patient does not work or drive. Lives at [facility] in [place].  Daughter assists with care and transportation.   PCP-Yareli Willingham  Pharmacy-[facility]    Utilizes DME in the home.  [employer] [facility] [employer]  Daughter states that patient in receiving in home PT but cannot recall company.  Unable to locate in chart.     Discharge needs TBD at this time.  Will continue to follow.

## 2025-05-08 NOTE — PROGRESS NOTES
Pharmacokinetic Assessment Follow Up: IV Vancomycin    Vancomycin serum concentration assessment(s):    The random level was drawn correctly and can be used to guide therapy at this time. The measurement is below the desired definitive target range of 15 to 20 mcg/mL.    Vancomycin Regimen Plan:    Continue regimen to Vancomycin 1000 mg IV every 12 hours with next serum trough concentration measured at 1100 prior to 4th dose on 05/10    Scheduled Administration Times    00  12    Drug levels (last 3 results):  Recent Labs   Lab Result Units 05/08/25  1018   Vancomycin Random ug/ml 14.2*       Vancomycin Administrations:  vancomycin given in the last 96 hours                     vancomycin 750 mg in D5W 250 mL IVPB (admixture device) (mg) 750 mg New Bag 05/08/25 0132    vancomycin (VANCOCIN) 1,000 mg in D5W 250 mL IVPB (admixture device) (mg) 1,000 mg New Bag 05/07/25 2222                    Pharmacy will continue to follow and monitor vancomycin.    Please contact pharmacy at extension [phone] for questions regarding this assessment.    Thank you for the consult,   Deann Mendosa       Patient brief summary:  Shyanne Cannon is a 74 y.o. female initiated on antimicrobial therapy with IV Vancomycin for treatment of lower respiratory infection    The patient's current regimen is 1000 mg q12h    Drug Allergies:   Review of patient's allergies indicates:   Allergen Reactions    Cephalexin Hives    Levofloxacin Hives, Other (See Comments) and Rash     Skin turned red       Actual Body Weight:  Wt Readings from Last 1 Encounters:   05/08/25 91.9 kg (202 lb 9.6 oz)       Renal Function:   Estimated Creatinine Clearance: 68.6 mL/min (based on SCr of 0.81 mg/dL).,     Dialysis Method (if applicable):  N/A    CBC (last 72 hours):  Recent Labs   Lab Result Units 05/07/25 1959 05/08/25  0514   WBC x10(3)/mcL 33.77  33.77* 23.13*   Hgb g/dL 12.0 10.9*   Hct % 38.4 35.0*   Platelet x10(3)/mcL 317 294   Mono % %  --  5.2    Monocytes % % 4  --    Eos % %  --  1.3   Basophil % %  --  0.4       Metabolic Panel (last 72 hours):  Recent Labs   Lab Result Units 05/07/25 1959 05/08/25  0514   Sodium mmol/L 135* 132*   Potassium mmol/L 4.3 4.0   Chloride mmol/L 93* 95*   CO2 mmol/L 31 30   Glucose mg/dL 131* 99   Blood Urea Nitrogen mg/dL 24.9* 23.1*   Creatinine mg/dL 1.07* 0.81   Albumin g/dL 3.1* 2.8*   Bilirubin Total mg/dL 0.4 0.4   ALP unit/L 96 91   AST unit/L 44 35   ALT unit/L 19 17   Magnesium Level mg/dL  --  1.60       Microbiologic Results:  Microbiology Results (last 7 days)       Procedure Component Value Units Date/Time    Blood culture #2 **CANNOT BE ORDERED STAT** [[id number]] Collected: 05/07/25 2208    Order Status: Resulted Specimen: Blood Updated: 05/08/25 0023    Blood culture #1 **CANNOT BE ORDERED STAT** [[id number]] Collected: 05/07/25 2208    Order Status: Resulted Specimen: Blood Updated: 05/08/25 0023    Respiratory Culture [[id number]]     Order Status: Sent Specimen: Sputum

## 2025-05-08 NOTE — PT/OT/SLP EVAL
"[facility]  Speech Language Pathology Department  Clinical Swallow Evaluation    Patient Name:  Shyanne Cannon   MRN:  [id number]    Recommendations     General recommendations:  Modified Barium Swallow Study  Medications: crushed in puree  Precautions: aspiration    History     Shyanne Cannon is a/n 74 y.o. female admitted with fall and weakness. Chest imaging shows  ground-glass opacities are noted in the right lower lobe. Medical history of bipolar disorder, anxiety, parkinsonism, memory loss, hypertension, hyperlipidemia, overactive bladder, chronic constipation, and COPD.     Home diet texture/consistency: Regular and thin liquids    Patient complaint: "I choke on my food sometimes"    Imaging   Results for orders placed during the hospital encounter of 12/18/24    X-Ray Chest 1 View    Narrative  EXAMINATION:  XR CHEST 1 VIEW    CLINICAL HISTORY:  Shortness of breadth;    COMPARISON:  25 November 2020    FINDINGS:  Frontal view of the chest was obtained. The heart is not enlarged.  There is no new focal consolidation or pneumothorax.    Impression  No acute findings.      Electronically signed by: Srinivasan Bautista  Date:    12/19/2024  Time:    14:41    No results found for this or any previous visit.    Results for orders placed during the hospital encounter of 12/18/24    MRI Brain Without Contrast    Narrative  EXAMINATION:  MRI BRAIN WITHOUT CONTRAST    CLINICAL HISTORY:  AMS;    TECHNIQUE:  Multiplanar multisequence MR imaging of the brain was performed without contrast.    COMPARISON:  CT scan dated 12/18/2024    FINDINGS:  No intracranial mass or lesion is seen.  No hemorrhage is seen.  No acute infarct is seen.  No restricted diffusion abnormality is seen.  There is diffuse cerebral atrophy seen along with some compensatory ventricular dilatation and periventricular white matter change consistent with patient's age.  Posterior fossa appears normal.  Calvarium is " intact.  Paranasal sinuses appear grossly unremarkable.    Impression  Chronic age related changes    Otherwise unremarkable      Electronically signed by: Derik Estes  Date:    12/19/2024  Time:    17:44    Subjective     Patient awake and alert.      Spiritual/Cultural/Samaritan Beliefs/Practices that affect care: no    Pain/Comfort: Pain Rating 1: 0/10    Respiratory Status:  4L via nc    Restraints/positioning devices: none    Objective     ORAL MUSCULATURE  Dentition: edentulous  Secretion Management: adequate  Mucosal Quality: good  Volitional Cough: Productive      PO TRIALS  Consistency Fed By Oral Symptoms Pharyngeal Symptoms   Thin liquid by straw Self None Reduced laryngeal movement to palpation     Assessment     Patient reports choking on food and had pna suspicious for aspiration. MBS warranted to further assess swallow function.     Outcome Measures     Functional Oral Intake Scale: 1 - Nothing by mouth    Education     Patient provided with verbal education regarding SLP POC.  Understanding was verbalized, however additional teaching warranted.    Plan     SLP Follow-Up:  Yes   Plan of Care reviewed with:  patient     Time Tracking     SLP Treatment Date:   05/08/25  Speech Start Time:  1300  Speech Stop Time:  1315     Speech Total Time (min):  15 min    Billable minutes:  Swallow and Oral Function Evaluation, 15 minutes     05/08/2025

## 2025-05-08 NOTE — ED PROVIDER NOTES
Encounter Date: 5/7/2025    SCRIBE #1 NOTE: I, Shruthi Goode, am scribing for, and in the presence of,  Fernandez Molina MD. I have scribed the entire note.       History     Chief Complaint   Patient presents with    Fall     Pt to ED via [facility]. Pt reports fall this morning around 6 am, pt laid on floor all day. Pt reports fall yesterday with headstrike with headache since. Pt denies hitting head this morning. -LOC -BT GCS 15     Patient is a 74 year old female presents to the ED via EMS following a fall. Pt reports she fell at around 0600 this morning and could not get up. Pt states she felt too weak to get up. Pt denies hitting her head. Pt is on supplemental O2 at home. Pt requires 2L of O2 at all times. Pt is currently experiencing a headache from a fall from yesterday. Pt is not on a blood thinner.     The history is provided by the patient. No  was used.     Review of patient's allergies indicates:   Allergen Reactions    Cephalexin Hives    Levofloxacin Hives, Other (See Comments) and Rash     Skin turned red     No past medical history on file.  No past surgical history on file.  No family history on file.  Social History[1]  Review of Systems   Constitutional:  Negative for fever.   HENT:  Negative for sore throat.    Eyes:  Negative for visual disturbance.   Respiratory:  Positive for shortness of breath.    Cardiovascular:  Negative for chest pain.   Gastrointestinal:  Negative for abdominal pain.   Genitourinary:  Negative for dysuria.   Musculoskeletal:  Negative for joint swelling.   Skin:  Negative for rash.   Neurological:  Positive for headaches. Negative for weakness.   Psychiatric/Behavioral:  Negative for confusion.        Physical Exam     Initial Vitals [05/07/25 1844]   BP Pulse Resp Temp SpO2   (!) 98/54 94 18 97.6 °F (36.4 °C) 95 %      MAP       --         Physical Exam    Nursing note and vitals reviewed.  Constitutional: She appears well-developed and well-nourished.    HENT:   Contusion to the right scalp    Eyes: EOM are normal. Pupils are equal, round, and reactive to light.   Neck:   Normal range of motion.  Cardiovascular:  Normal rate, regular rhythm, normal heart sounds and intact distal pulses.           No murmur heard.  Pulmonary/Chest: No respiratory distress. She has wheezes in the right upper field, the right middle field, the right lower field, the left upper field, the left middle field and the left lower field. She has no rales.   Abdominal: Abdomen is soft. She exhibits no distension. There is no abdominal tenderness. There is no rebound.   Musculoskeletal:         General: No tenderness or edema. Normal range of motion.      Cervical back: Normal range of motion.     Neurological: She is alert. She has normal strength. No cranial nerve deficit. GCS score is 15. GCS eye subscore is 4. GCS verbal subscore is 5. GCS motor subscore is 6.   Skin: Skin is warm and dry. Capillary refill takes less than 2 seconds. No rash noted. No erythema.   Psychiatric: She has a normal mood and affect.         ED Course   Procedures  Labs Reviewed   COMPREHENSIVE METABOLIC PANEL - Abnormal       Result Value    Sodium 135 (*)     Potassium 4.3      Chloride 93 (*)     CO2 31      Glucose 131 (*)     Blood Urea Nitrogen 24.9 (*)     Creatinine 1.07 (*)     Calcium 9.8      Protein Total 6.6      Albumin 3.1 (*)     Globulin 3.5      Albumin/Globulin Ratio 0.9 (*)     Bilirubin Total 0.4      ALP 96      ALT 19      AST 44      eGFR 55      Anion Gap 11.0      BUN/Creatinine Ratio 23     CK - Abnormal    Creatine Kinase 1,443 (*)    CBC WITH DIFFERENTIAL - Abnormal    WBC 33.77 (*)     RBC 4.48      Hgb 12.0      Hct 38.4      MCV 85.7      MCH 26.8 (*)     MCHC 31.3 (*)     RDW 15.3      Platelet 317      MPV 9.5     MANUAL DIFFERENTIAL - Abnormal    WBC 33.77      Neutrophils % 89      Lymphs % 4      Monocytes % 4      Metamyelocytes % 3 (*)     Neutrophils Abs 30.0553 (*)      Lymphs Abs 1.3508      Monocytes Abs 1.3508 (*)     Platelets Normal      RBC Morph Normal     PROTIME-INR - Normal    PT 14.5      INR 1.2      Narrative:     Protimes are used to monitor anticoagulant agents such as warfarin. PT INR values are based on the current patient normal mean and the KEENA value for the specific instrument reagent used.  **Routine theraputic target values for the INR are 2.0-3.0**   APTT - Normal    PTT 32.3     LACTIC ACID, PLASMA - Normal    Lactic Acid Level 1.7     COVID/FLU A&B PCR - Normal    Influenza A PCR Not Detected      Influenza B PCR Not Detected      SARS-CoV-2 PCR Not Detected      Narrative:     The Xpert Xpress SARS-CoV-2/FLU/RSV plus is a rapid, multiplexed real-time PCR test intended for the simultaneous qualitative detection and differentiation of SARS-CoV-2, Influenza A, Influenza B, and respiratory syncytial virus (RSV) viral RNA in either nasopharyngeal swab or nasal swab specimens.         CBC W/ AUTO DIFFERENTIAL    Narrative:     The following orders were created for panel order CBC auto differential.  Procedure                               Abnormality         Status                     ---------                               -----------         ------                     CBC with Differential[[id number]]       Abnormal            Final result               Manual Differential[[id number]]         Abnormal            Final result                 Please view results for these tests on the individual orders.        ECG Results    None       Imaging Results              CT Chest Abdomen Pelvis With IV Contrast (XPD) NO Oral Contrast (Final result)  Result time 05/08/25 07:43:22      Final result by Felix Castañeda MD (05/08/25 07:43:22)                   Impression:      1. Poorly defined ground-glass opacities throughout the right greater than left lung reflecting multifocal pneumonia versus aspiration pneumonitis.  2. No posttraumatic abnormality of the abdomen and  pelvis.  [facility] concordance      Electronically signed by: Felix Castañeda MD  Date:    05/08/2025  Time:    07:43               Narrative:    EXAMINATION:  CT CHEST ABDOMEN PELVIS WITH IV CONTRAST (XPD)    CLINICAL HISTORY:  Trauma;    TECHNIQUE:  Axial images of the chest abdomen and pelvis were obtained with IV contrast administration.  Coronal and sagittal reconstructions were provided.  Three dimensional and MIP images were obtained and evaluated.  Total DLP was 2819 mGy-cm. Dose lowering technique and automated exposure control were utilized for this exam.    COMPARISON:  CT of the abdomen and pelvis 09/28/2020.    FINDINGS:  There is no traumatic aortic injury.  There is no active extravasation.  There is no pneumothorax.  There is no free fluid in the pelvis.    The airway is widely patent.  There is no mediastinal or hilar lymphadenopathy.  There is no central pulmonary embolus.  The heart is not enlarged.  There are extensive poorly defined ground-glass opacities throughout the right lung and to a lesser extent the left lower lobe.  There is no pleural effusion or hemothorax.  There is no pulmonary nodule or mass.    The liver, spleen, pancreas, adrenal glands and kidneys are normal.  There is no solid organ laceration.  The gallbladder is surgically absent.  The stomach and small bowel are decompressed.  The colon is normal.  The uterus and adnexa are normal for age.  The urinary bladder is normal.  There is no pelvic or retroperitoneal lymphadenopathy.  The aorta is nonaneurysmal.  There is no lytic or blastic osseous lesion.  There is no fracture.                        Preliminary result by Ariel Bernard Jr., MD (05/07/25 22:06:58)                   Impression:    1. There are scattered dense opacities tending to consolidation in the right lung and left lower lobe. These findings are consistent multilobar possibly atypical pneumonia. Correlate with clinical and laboratory findings and recommend  continued interval follow-up to full resolution as indicated.  2. No acute traumatic intrathoracic pathology identified. No acute traumatic intraabdominal or pelvic solid organ or bowel pathology identified. Details and other findings as discussed above.               Narrative:    START OF REPORT:  Technique: CT Scan of the chest abdomen and pelvis was performed with intravenous contrast with axial as well as sagittal and, coronal images.    Dosage Information: Automated Exposure Control was utilized.    Comparison: Comparison is with study [id number] 15:08:27.    Clinical History: Fall.    Findings:  Soft Tissues: Unremarkable.  Lines and Tubes: None.  Neck: The visualized soft tissues of the neck appear unremarkable. The right and left thyroid gland appear unremarkable.  Mediastinum: Multiple subcentimeter to enlarged mediastinal lymph nodes are seen in the upper and lower paratracheal precarinal subcarinal and right perihilar spaces . The largest is in right hilar space and measures 1.4 cm in least dimension. This suggests reactive lymphadenopathy.  Heart: The heart size is within normal limits. Moderate coronary artery calcification is seen.  Aorta: Moderate aortic calcification is seen in the thoracic aorta.  Lungs: There are scattered dense opacities tending to consolidation in the right lung and left lower lobe. These findings are consistent multilobar possibly atypical pneumonia.  Pleura: No effusions or pneumothorax are identified.  Bony Structures:  Spine: Moderate multilevel spondylolytic changes are seen in the thoracic spine.  Ribs: The left ribs appear unremarkable. There is a chronic right 7th posterior rib deformity seen on Series 4 Image 62. No new acute right rib fracture is seen.  Liver: The liver appears unremarkable.  Trauma: No traumatic injury is identified.  Biliary System: No intrahepatic or extrahepatic biliary duct dilatation is seen.  Gallbladder: Surgical clips are again seen in  the gallbladder fossa consistent with prior cholecystectomy.  Pancreas: The pancreas appears unremarkable. No pancreatic mass, or, ductal dilatation is seen.  Spleen: The spleen appears unremarkable.  Trauma: No specific evidence of splenic trauma is seen.  Adrenals: The adrenal glands appear unremarkable.  Kidneys: The kidneys appear unremarkable with no stones cysts masses or hydronephrosis.  Aorta: There is mild calcification of the abdominal aorta and its branches.  IVC: Unremarkable.  Bowel:  Esophagus: The visualized esophagus appears unremarkable.  Stomach: The stomach appears unremarkable.  Duodenum: Unremarkable appearing duodenum.  Small Bowel: The small bowel appears unremarkable.  Colon: Nondistended. There is moderate stool in the colon which could reflect an element of constipation. Surgical anastomosis of the sigmoid and descending colon is now noted.  Appendix: The appendix is not identified but no inflammatory changes are seen in the right lower quadrant to suggest appendicitis.  Peritoneum: No intraperitoneal free air or ascites is seen.    Pelvis:  Bladder: The bladder appears unremarkable.  Female:  Uterus: The uterus appears unremarkable.  Ovaries: The ovaries appear unremarkable with probable dominant right sided physiologic cysts. No adnexal masses are seen.    Bony structures:  Dorsal Spine: There is moderate spondylosis of the visualized dorsal spine. There is stable grade 1 anterior listhesis of L4 over L5 without associated spondylolysis.  Bony Pelvis: There is mild degenerative change of the hip. The visualized bony structures of the pelvis otherwise appear unremarkable.                                         CT Cervical Spine Without Contrast (Final result)  Result time 05/08/25 07:19:11      Final result by Senthil Paul MD (05/08/25 07:19:11)                   Impression:      No acute osseous abnormality.    Mixture of ground-glass opacity and consolidation in the partially imaged  right lung apex.  Please refer to concurrent CT chest, abdomen, and pelvis report for full details.    I agree with the preliminary report      Electronically signed by: Senthil Paul  Date:    05/08/2025  Time:    07:19               Narrative:    EXAMINATION:  CT CERVICAL SPINE WITHOUT CONTRAST    CLINICAL HISTORY:  Trauma;    TECHNIQUE:  Low dose axial images, sagittal and coronal reformations were performed though the cervical spine. Contrast was not administered. Dose reduction techniques including automatic exposure control (AEC) were utilized.    Dose (DLP): 787 mGycm    COMPARISON:  CT cervical spine 09/23/2020    FINDINGS:  Straightening of the normal cervical lordosis.  No subluxation.  No acute fracture.  No aggressive or destructive osseous abnormality.  Moderate multilevel cervical disc degeneration.  Mild multilevel facet joint degeneration.  No significant central canal or foraminal stenosis.  No acute paraspinous soft tissue abnormality.  Mixture of ground-glass opacity and consolidation in the right lung apex.                        Preliminary result by Ariel Bernard Jr., MD (05/07/25 21:24:38)                   Impression:    1. No acute cervical spine fracture dislocation or subluxation is seen.  2. There is a confluent air space density and multiple nodular densities in the right upper lung. These can be inflammatory such as pneumonia with underlying mass not excluded. Correlate with clinical and laboratory findings as regards additional evaluation and follow-up.  3. Degenerative changes and other details as above.               Narrative:    START OF REPORT:  Technique: CT of the cervical spine was performed without intravenous contrast with axial as well as sagittal and coronal images.    Comparison: Comparison is with study dated 2020-09-23 21:18:57.    Dosage Information: Automated exposure control was utilized.    Clinical history: Fall.    Findings:  Position: Supine.  Artifact:  None.  Lung apices: There is a confluent air space density and multiple nodular densities in the right upper lung. These can be inflammatory such as pneumonia with underlying mass not excluded.  Spine:  Spinal canal: The spinal canal appears unremarkable.  Mineralization: Moderate stable osteopenia is seen in the visualized bony structures of the cervical spine.  Rotation: No significant rotation is seen.  Scoliosis: No significant scoliosis is seen.  Vertebral Fusion: No vertebral fusion is identified.  Listhesis: There is grade I degenerative anterolisthesis C4 on C5.  Lordosis: Moderate degenerative straightening of the cervical lordosis is seen. This may reflect an element of myospasm.  Intervertebral disc spaces: Multilevel loss of disc height is seen.  Osteophytes: Mild to moderate stable multilevel endplate osteophytes are seen.  Endplate Sclerosis: No significant endplate sclerosis is seen.  Uncovertebral degenerative changes: Mild stable multilevel uncovertebral joint arthrosis is seen.  Facet degenerative changes: Mild stable multilevel facet degenerative changes are seen.  Calcifications: None.  Fractures: No acute cervical spine fracture dislocation or subluxation is seen.    Miscellaneous:  Mastoid air cells: The visualized mastoid air cells appear clear.  Soft Tissues: Unremarkable.                                         CT Head Without Contrast (Final result)  Result time 05/08/25 07:13:45      Final result by Senthil Paul MD (05/08/25 07:13:45)                   Impression:      No acute intracranial pathology.    No significant discrepancy between preliminary and final reports.      Electronically signed by: Senthil Paul  Date:    05/08/2025  Time:    07:13               Narrative:    EXAMINATION:  CT HEAD WITHOUT CONTRAST    CLINICAL HISTORY:  Trauma;    TECHNIQUE:  Low dose axial images were obtained through the head.  Coronal and sagittal reformations were also performed. Contrast was not  administered.  Dose reduction techniques including automatic exposure control (AEC) were utilized.    Dose (DLP): 1184 mGycm    COMPARISON:  MR brain 12/19/2024.  CT head 12/18/2024.    FINDINGS:  INTRACRANIAL: Brain parenchyma attenuation and configuration is unremarkable.  No acute intracranial hemorrhage.  No hydrocephalus.  No intracranial mass effect.    SINUSES: Visualized paranasal sinuses and mastoids are clear.    SKULL/SCALP: Visualized osseous structures are normal.    ORBITS: Visualized orbits are normal.                        Preliminary result by Ariel Bernard Jr., MD (05/07/25 21:23:35)                   Impression:    1. No acute intracranial traumatic injury identified. Details and other findings as noted above.               Narrative:    START OF REPORT:  Technique: CT of the head was performed without intravenous contrast with axial as well as coronal and sagittal images.    Comparison: Comparison is with study [id number] 21:52:04.    Dosage Information: Automated exposure control was utilized.    Clinical history: Fall.    Findings:  Hemorrhage: No acute intracranial hemorrhage is seen.  CSF spaces: The ventricles sulci and basal cisterns are within normal limits for age and stable compared to the prior study.  Brain parenchyma: There is preservation of the grey white junction throughout. No acute infarct is identified. Minimal stable appearing microvascular change is seen in portions of the periventricular and deep white matter tracts.  Cerebellum: Unremarkable.  Vascular: Unremarkable venous sinuses. Subtle stable appearing atheromatous calcification of the intracranial arteries is seen.  Sella and skull base: The sella appears to be within normal limits for age.  Intracranial calcifications: Incidental note is made of bilateral choroid plexus calcification. Incidental note is made of some pineal region calcification.  Calvarium: No acute linear or depressed skull fracture is  seen.    Maxillofacial Structures:  Paranasal sinuses: The visualized paranasal sinuses appear clear with no significant mucoperiosteal thickening or air fluid levels identified.  Orbits: The orbits appear unremarkable.  Zygomatic arches: The zygomatic arches are intact and unremarkable.  Temporal bones and mastoids: The temporal bones and mastoids appear unremarkable.  TMJ: The mandibular condyles appear normally placed with respect to the mandibular fossa.  Nasal Bones: No displaced nasal bone fracture is seen.    Visualized upper cervical spine: The visualized cervical spine appears unremarkable.                                         Medications   sodium chloride 0.9% bolus 1,000 mL 1,000 mL (has no administration in time range)   0.9% NaCl infusion ( Intravenous New Bag 5/16/25 0311)   sodium chloride 0.9% bolus 1,000 mL 1,000 mL (0 mLs Intravenous Stopped 5/7/25 2216)   iohexoL (OMNIPAQUE 350) injection 100 mL (100 mLs Intravenous Given 5/7/25 2119)   ceFEPIme injection 2 g (2 g Intravenous Given 5/7/25 2221)   mupirocin 2 % ointment ( Nasal Given 5/12/25 2117)   vancomycin (VANCOCIN) 1,000 mg in D5W 250 mL IVPB (admixture device) (0 mg Intravenous Stopped 5/7/25 2352)     Followed by   vancomycin 750 mg in D5W 250 mL IVPB (admixture device) (0 mg Intravenous Stopped 5/8/25 0302)   azithromycin (ZITHROMAX) 500 mg in D5W 250 mL  IVPB (admixture device) (0 mg Intravenous Stopped 5/12/25 1050)   methylPREDNISolone sodium succinate injection 80 mg (80 mg Intravenous Given 5/11/25 1833)   piperacillin-tazobactam (ZOSYN) 4.5 g in D5W 100 mL IVPB (MB+) (0 g Intravenous Stopped 5/14/25 2205)   magnesium sulfate 2g in water 50mL IVPB (premix) (0 g Intravenous Stopped 5/16/25 1841)     Followed by   magnesium sulfate 2g in water 50mL IVPB (premix) (0 g Intravenous Stopped 5/16/25 2041)     Medical Decision Making  Judging by the patient's chief complaint and pertinent history, the patient has the following possible  differential diagnoses, including but not limited to the following.  Some of these are deemed to be lower likelihood and some more likely based on my physical exam and history combined with possible lab work and/or imaging studies.   Please see the pertinent studies, and refer to the HPI.  Some of these diagnoses will take further evaluation to fully rule out, perhaps as an outpatient and the patient was encouraged to follow up when discharged for more comprehensive evaluation.      abrasion, contusion, fracture, traumatic ICH, TBI, concussion, spinal injury, fracture, pneumothorax, hemothorax, intrathoracic injury, intraabdominal injury, hemorrhage, laceration ACS, pneumonia, COVID/Flu, congestive heart failure, asthma, COPD, pleural effusion, pulmonary edema, acute bronchitis,    Patient is a 74-year-old female who presents to emergency department after experiencing a fall this morning at 6:00 a.m..  Laid on the floor all day.  See HPI.  See physical exam.  Airway intact equal breath sounds, circulation appears to be intact.  Labs obtained CT scans obtained.  CT of the chest with evidence of infiltrates.  Blood cultures obtained started on broad-spectrum antibiotics.  Discussed with hospital medicine who will admit the patient.    Problems Addressed:  Leukocytosis, unspecified type: acute illness or injury that poses a threat to life or bodily functions  Pneumonia due to infectious organism, unspecified laterality, unspecified part of lung: acute illness or injury that poses a threat to life or bodily functions  Traumatic rhabdomyolysis, initial encounter: acute illness or injury that poses a threat to life or bodily functions    Amount and/or Complexity of Data Reviewed  Independent Historian: EMS     Details: Collateral from paramedics  External Data Reviewed: notes.     Details: Reviewed old records  Labs: ordered.  Radiology: ordered.  ECG/medicine tests: ordered and independent interpretation  "performed.  Discussion of management or test interpretation with external provider(s): Discussed case with hospital medicine who will admit the patient.    Risk  Prescription drug management.  Parenteral controlled substances.  Decision regarding hospitalization.            Scribe Attestation:   Scribe #1: I performed the above scribed service and the documentation accurately describes the services I performed. I attest to the accuracy of the note.    Attending Attestation:           Physician Attestation for Scribe:  Physician Attestation Statement for Scribe #1: I, Fernandez Molina MD, reviewed documentation, as scribed by Shruthi Goode in my presence, and it is both accurate and complete.                        Medical Decision Making:   Clinical Tests:   Sepsis Perfusion Assessment: "I attest a sepsis perfusion exam was performed within 6 hours of sepsis, severe sepsis, or septic shock presentation, following fluid resuscitation."    Sepsis Perfusion Assessment Complete: 5/7/2025 10:15 PM               Clinical Impression:  Final diagnoses:  [J18.9] Pneumonia due to infectious organism, unspecified laterality, unspecified part of lung (Primary)  [D72.829] Leukocytosis, unspecified type  [T79.6XXA] Traumatic rhabdomyolysis, initial encounter          ED Disposition Condition    Admit                   [1]         Fernandez Molina MD  05/21/25 0452    "

## 2025-05-08 NOTE — H&P
[facility] Hospital Medicine History & Physical Examination       Patient Name: Shyanne Cannon  MRN: [id number]  Patient Class: IP- Inpatient   Admission Date: 5/7/2025   Admitting Physician: MAGALYS Service   Length of Stay: 1  Attending Physician: Susie Londono MD  Primary Care Provider: Mayito Roberts MD  Face-to-Face encounter date: 05/08/2025  Code Status:  Full code  Chief Complaint: Fall (Pt to ED via [facility]. Pt reports fall this morning around 6 am, pt laid on floor all day. Pt reports fall yesterday with headstrike with headache since. Pt denies hitting head this morning. -LOC -BT GCS 15)      Screening for Social Drivers for health:  Patient screened for food insecurity, housing instability, transportation needs, utility difficulties, and interpersonal safety (select all that apply as identified as concern)  []Housing or Food  []Transportation Needs  []Utility Difficulties  []Interpersonal safety  [x]None      Patient information was obtained from patient, patient's family, past medical records and ER records.  ED records were reviewed in detail and documented below    HISTORY OF PRESENT ILLNESS:   Shyanne Cannno is a 74 y.o. female who has a medical history of bipolar disorder, anxiety, parkinsonism, memory loss, hypertension, hyperlipidemia, overactive bladder, chronic constipation, and COPD.  The patient presented to [facility] on 5/7/2025 with a primary complaint of fall and weakness.  Patient mentions that she has indoor 2 falls in last 4 days.  Yesterday, she had a fall as she was grabbing a picture of ice tea from the Fridge.  She fell to the ground and drop the entire patient advised to be on herself and the floor. After falling, she was too weak to stand back up to her feet so she laid there until her daughter came home.  She mentions that she lives alone and moves around with a rolling walker.  Daughter does daily wellness checks.  Patient denies any fever, chills,  body aches, nausea, vomiting, loss his consciousness, head trauma, loss of bowel or bladder, or cough.    In the ED, vital signs were found to be normal.  CBC shows WBC count of 33 K. CMP is unremarkable.  CPK slightly elevated at 1443.  Troponin and lactic acid are WNL.  CT head shows no acute intracranial pathology.  CT abdomen pelvis shows no posttraumatic abnormality of the abdomen and pelvis.  CT spine shows no acute osseous abnormality, however ground-glass opacities are noted in the right lower lobe.  Blood cultures, urine cultures, sputum cultures pending.      Continue with vancomycin and cefepime, add DuoNebs p.r.n., Breo Ellipta.  SLP, PT/OT, and cm consulted.  Follow up cultures and narrow antibiotics as indicated.  PAST MEDICAL HISTORY:   No past medical history on file.    PAST SURGICAL HISTORY:   No past surgical history on file.    ALLERGIES:   Cephalexin and Levofloxacin    FAMILY HISTORY:   Reviewed and negative    SOCIAL HISTORY:     Social History     Tobacco Use    Smoking status: Not on file    Smokeless tobacco: Not on file   Substance Use Topics    Alcohol use: Not on file        HOME MEDICATIONS:     Prior to Admission medications    Medication Sig Start Date End Date Taking? Authorizing Provider   nystatin (MYCOSTATIN) powder Apply topically 4 (four) times daily. 12/21/24 2/19/25  Kylah Pascal,        REVIEW OF SYSTEMS:   Except as documented, all other systems reviewed and negative     PHYSICAL EXAM:     VITAL SIGNS: 24 HRS MIN & MAX LAST   Temp  Min: 97.6 °F (36.4 °C)  Max: 98.5 °F (36.9 °C) 97.9 °F (36.6 °C)   BP  Min: 98/54  Max: 128/74 103/67   Pulse  Min: 91  Max: 99  96   Resp  Min: 15  Max: 20 18   SpO2  Min: 88 %  Max: 97 % (!) 92 %     General appearance: Well-developed, morbidly obese.  HENT: Atraumatic head. Moist mucous membranes of oral cavity.  Eyes: Normal extraocular movements.   Neck: Supple.   Lungs:  Right lower lung crackles  Heart: Regular rate and rhythm. S1 and S2  present with no murmurs/gallop/rub. No pedal edema. No JVD present.   Abdomen: Soft, non-distended, non-tender. No rebound tenderness/guarding. Bowel sounds are normal.   Extremities: No cyanosis, clubbing, or edema.  Skin: No Rash.   Neuro: Motor and sensory exams grossly intact. Good tone. Muscle strength 5/5 in all 4 extremities  Psych/mental status: Appropriate mood and affect. Responds appropriately to questions.     LABS AND IMAGING:     Recent Labs   Lab 05/07/25 1959 05/08/25 0514   WBC 33.77  33.77* 23.13*   RBC 4.48 4.05*   HGB 12.0 10.9*   HCT 38.4 35.0*   MCV 85.7 86.4   MCH 26.8* 26.9*   MCHC 31.3* 31.1*   RDW 15.3 15.3    294   MPV 9.5 9.7       Recent Labs   Lab 05/07/25 1959 05/08/25 0514   * 132*   K 4.3 4.0   CL 93* 95*   CO2 31 30   BUN 24.9* 23.1*   CREATININE 1.07* 0.81   * 99   CALCIUM 9.8 9.5   MG  --  1.60   ALBUMIN 3.1* 2.8*   PROT 6.6 6.0   ALKPHOS 96 91   ALT 19 17   AST 44 35   BILITOT 0.4 0.4       Microbiology Results (last 7 days)       Procedure Component Value Units Date/Time    Blood culture #2 **CANNOT BE ORDERED STAT** [[id number]] Collected: 05/07/25 2208    Order Status: Resulted Specimen: Blood Updated: 05/08/25 0023    Blood culture #1 **CANNOT BE ORDERED STAT** [[id number]] Collected: 05/07/25 2208    Order Status: Resulted Specimen: Blood Updated: 05/08/25 0023    Respiratory Culture [[id number]]     Order Status: Sent Specimen: Sputum              CT Chest Abdomen Pelvis With IV Contrast (XPD) NO Oral Contrast  Narrative: EXAMINATION:  CT CHEST ABDOMEN PELVIS WITH IV CONTRAST (XPD)    CLINICAL HISTORY:  Trauma;    TECHNIQUE:  Axial images of the chest abdomen and pelvis were obtained with IV contrast administration.  Coronal and sagittal reconstructions were provided.  Three dimensional and MIP images were obtained and evaluated.  Total DLP was 2819 mGy-cm. Dose lowering technique and automated exposure control were utilized for this  exam.    COMPARISON:  CT of the abdomen and pelvis 09/28/2020.    FINDINGS:  There is no traumatic aortic injury.  There is no active extravasation.  There is no pneumothorax.  There is no free fluid in the pelvis.    The airway is widely patent.  There is no mediastinal or hilar lymphadenopathy.  There is no central pulmonary embolus.  The heart is not enlarged.  There are extensive poorly defined ground-glass opacities throughout the right lung and to a lesser extent the left lower lobe.  There is no pleural effusion or hemothorax.  There is no pulmonary nodule or mass.    The liver, spleen, pancreas, adrenal glands and kidneys are normal.  There is no solid organ laceration.  The gallbladder is surgically absent.  The stomach and small bowel are decompressed.  The colon is normal.  The uterus and adnexa are normal for age.  The urinary bladder is normal.  There is no pelvic or retroperitoneal lymphadenopathy.  The aorta is nonaneurysmal.  There is no lytic or blastic osseous lesion.  There is no fracture.  Impression: 1. Poorly defined ground-glass opacities throughout the right greater than left lung reflecting multifocal pneumonia versus aspiration pneumonitis.  2. No posttraumatic abnormality of the abdomen and pelvis.  [place] concordance    Electronically signed by: Felix Castañeda MD  Date:    05/08/2025  Time:    07:43  CT Cervical Spine Without Contrast  Narrative: EXAMINATION:  CT CERVICAL SPINE WITHOUT CONTRAST    CLINICAL HISTORY:  Trauma;    TECHNIQUE:  Low dose axial images, sagittal and coronal reformations were performed though the cervical spine. Contrast was not administered. Dose reduction techniques including automatic exposure control (AEC) were utilized.    Dose (DLP): 787 mGycm    COMPARISON:  CT cervical spine 09/23/2020    FINDINGS:  Straightening of the normal cervical lordosis.  No subluxation.  No acute fracture.  No aggressive or destructive osseous abnormality.  Moderate multilevel  cervical disc degeneration.  Mild multilevel facet joint degeneration.  No significant central canal or foraminal stenosis.  No acute paraspinous soft tissue abnormality.  Mixture of ground-glass opacity and consolidation in the right lung apex.  Impression: No acute osseous abnormality.    Mixture of ground-glass opacity and consolidation in the partially imaged right lung apex.  Please refer to concurrent CT chest, abdomen, and pelvis report for full details.    I agree with the preliminary report    Electronically signed by: Senthil Paul  Date:    05/08/2025  Time:    07:19  CT Head Without Contrast  Narrative: EXAMINATION:  CT HEAD WITHOUT CONTRAST    CLINICAL HISTORY:  Trauma;    TECHNIQUE:  Low dose axial images were obtained through the head.  Coronal and sagittal reformations were also performed. Contrast was not administered.  Dose reduction techniques including automatic exposure control (AEC) were utilized.    Dose (DLP): 1184 mGycm    COMPARISON:  MR brain 12/19/2024.  CT head 12/18/2024.    FINDINGS:  INTRACRANIAL: Brain parenchyma attenuation and configuration is unremarkable.  No acute intracranial hemorrhage.  No hydrocephalus.  No intracranial mass effect.    SINUSES: Visualized paranasal sinuses and mastoids are clear.    SKULL/SCALP: Visualized osseous structures are normal.    ORBITS: Visualized orbits are normal.  Impression: No acute intracranial pathology.    No significant discrepancy between preliminary and final reports.    Electronically signed by: Senthil Paul  Date:    05/08/2025  Time:    07:13      ASSESSMENT & PLAN:   Right lower lobe pneumonia; possibly due to aspiration pneumonia  Fall and weakness  Leukocytosis    Other medical history includes bipolar disorder, anxiety, parkinsonism, memory loss, hypertension, hyperlipidemia, overactive bladder, chronic constipation, COPD    -CT completed head to toe which shows no acute abnormalities except for right lower lung  pneumonia  -started on vancomycin and cefepime; treat for 5-7 days  -tailor antibiotics as indicated with culture  -follow up with blood, urine, sputum culture  -daily labs  -SLP, PT/OT, and cm consulted  -bowel regimen      VTE Prophylaxis:  Lovenox    Patient condition:  Stable    __________________________________________________________________________  INPATIENT LIST OF MEDICATIONS     Scheduled Meds:   ceFEPime IV (PEDS and ADULTS)  1 g Intravenous Q6H    mupirocin   Nasal BID    nystatin-triamcinolone   Topical (Top) TID    sodium chloride 0.9%  1,000 mL Intravenous ED 1 Time     Continuous Infusions:  PRN Meds:.  Current Facility-Administered Medications:     acetaminophen, 650 mg, Oral, Q4H PRN    albuterol-ipratropium, 3 mL, Nebulization, Q4H PRN    aluminum-magnesium hydroxide-simethicone, 30 mL, Oral, QID PRN    bisacodyL, 10 mg, Rectal, Daily PRN    dextrose 50%, 12.5 g, Intravenous, PRN    dextrose 50%, 25 g, Intravenous, PRN    glucagon (human recombinant), 1 mg, Intramuscular, PRN    glucose, 16 g, Oral, PRN    glucose, 24 g, Oral, PRN    melatonin, 6 mg, Oral, Nightly PRN    polyethylene glycol, 17 g, Oral, BID PRN    sodium chloride 0.9%, 10 mL, Intravenous, PRN    vancomycin - pharmacy to dose, , Intravenous, pharmacy to manage frequency    Susie Londono MD   05/08/2025

## 2025-05-08 NOTE — PROGRESS NOTES
"Pharmacokinetic Initial Assessment: IV Vancomycin    Assessment/Plan:    Initiate intravenous vancomycin with loading dose of 1750 mg once given by ED with subsequent doses when random concentrations are less than 20 mcg/mL  Desired empiric serum trough concentration is 15 to 20 mcg/mL  Draw vancomycin random level on 05/08 at 0930.  Pharmacy will continue to follow and monitor vancomycin.      Please contact pharmacy at extension [phone] with any questions regarding this assessment.     Thank you for the consult,   Jt Tamir       Patient brief summary:  Shyanne Cannon is a 74 y.o. female initiated on antimicrobial therapy with IV Vancomycin for treatment of suspected lower respiratory infection    Drug Allergies:   Review of patient's allergies indicates:   Allergen Reactions    Cephalexin Hives    Levofloxacin Hives, Other (See Comments) and Rash     Skin turned red       Actual Body Weight:   Wt Readings from Last 1 Encounters:   05/08/25 91.9 kg (202 lb 9.6 oz)       Renal Function:   Estimated Creatinine Clearance: 52 mL/min (A) (based on SCr of 1.07 mg/dL (H)).,     Dialysis Method (if applicable):  STEVE    CBC (last 72 hours):  Recent Labs   Lab Result Units 05/07/25 1959   WBC x10(3)/mcL 33.77  33.77*   Hgb g/dL 12.0   Hct % 38.4   Platelet x10(3)/mcL 317   Monocytes % % 4       Metabolic Panel (last 72 hours):  Recent Labs   Lab Result Units 05/07/25 1959   Sodium mmol/L 135*   Potassium mmol/L 4.3   Chloride mmol/L 93*   CO2 mmol/L 31   Glucose mg/dL 131*   Blood Urea Nitrogen mg/dL 24.9*   Creatinine mg/dL 1.07*   Albumin g/dL 3.1*   Bilirubin Total mg/dL 0.4   ALP unit/L 96   AST unit/L 44   ALT unit/L 19       Drug levels (last 3 results):  No results for input(s): "VANCOMYCINRA", "VANCORANDOM", "VANCOMYCINPE", "VANCOPEAK", "VANCOMYCINTR", "VANCOTROUGH" in the last 72 hours.    Microbiologic Results:  Microbiology Results (last 7 days)       Procedure Component Value Units Date/Time    Blood " culture #2 **CANNOT BE ORDERED STAT** [[id number]] Collected: 05/07/25 2208    Order Status: Resulted Specimen: Blood Updated: 05/08/25 0023    Blood culture #1 **CANNOT BE ORDERED STAT** [[id number]] Collected: 05/07/25 2208    Order Status: Resulted Specimen: Blood Updated: 05/08/25 0023    Respiratory Culture [[id number]]     Order Status: Sent Specimen: Sputum

## 2025-05-08 NOTE — PLAN OF CARE
1020: Message left for patient's PCP Juanita Willingham NP to get name of home health company as patient is unsure of name.     1103: Patient is current with [facility]

## 2025-05-08 NOTE — PROCEDURES
[facility]  Speech Language Pathology Department  Modified Barium Swallow (MBS) Study    Patient Name:  Shyanne Cannon   MRN:  [id number]    Recommendations     General recommendations:  SLP follow up regarding diet tolerance  Repeat MBS study: if clinically needed  Solid texture recommendation:  Soft & Bite Sized Diet - IDDSI Level 6  Liquid consistency recommendation: Thin liquids - IDDSI Level 0   Medications: per patient preference  Swallow strategies/precautions: small bites/sips, slow rate, upright for PO intake, assist with feeding as needed, and feed only when fully alert  General precautions: aspiration    History     Shynane Cannon is a/n 74 y.o. female admitted with fall and weakness. Chest imaging shows  ground-glass opacities are noted in the right lower lobe. Medical history of bipolar disorder, anxiety, parkinsonism, memory loss, hypertension, hyperlipidemia, overactive bladder, chronic constipation, and COPD.       A MBS Study was completed to assess the efficiency of her swallow function, rule out aspiration and make recommendations regarding safe dietary consistencies, effective compensatory strategies, and safe eating environment.       Patient complaint: none stated    Imaging   Results for orders placed during the hospital encounter of 12/18/24    X-Ray Chest 1 View    Narrative  EXAMINATION:  XR CHEST 1 VIEW    CLINICAL HISTORY:  Shortness of breadth;    COMPARISON:  25 November 2020    FINDINGS:  Frontal view of the chest was obtained. The heart is not enlarged.  There is no new focal consolidation or pneumothorax.    Impression  No acute findings.      Electronically signed by: Srinivasan Bautista  Date:    12/19/2024  Time:    14:41    No results found for this or any previous visit.    Results for orders placed during the hospital encounter of 12/18/24    MRI Brain Without Contrast    Narrative  EXAMINATION:  MRI BRAIN WITHOUT CONTRAST    CLINICAL  HISTORY:  AMS;    TECHNIQUE:  Multiplanar multisequence MR imaging of the brain was performed without contrast.    COMPARISON:  CT scan dated 12/18/2024    FINDINGS:  No intracranial mass or lesion is seen.  No hemorrhage is seen.  No acute infarct is seen.  No restricted diffusion abnormality is seen.  There is diffuse cerebral atrophy seen along with some compensatory ventricular dilatation and periventricular white matter change consistent with patient's age.  Posterior fossa appears normal.  Calvarium is intact.  Paranasal sinuses appear grossly unremarkable.    Impression  Chronic age related changes    Otherwise unremarkable      Electronically signed by: Derik Estes  Date:    12/19/2024  Time:    17:44    Subjective     Patient awake and cooperative.    Spiritual/Cultural/Church Beliefs/Practices that affect care: no  Pain/Comfort: Pain Rating 1: 0/10    Respiratory Status:  Nasal cannula    Restraints/positioning devices: none    Fluoroscopic Findings     Oral Musculature  Dentition: edentulous  Secretion Management: adequate  Mucosal Quality: good  Facial Movement: WFL    Setup  Upright in bed  Able to self feed  Adequate head control    Visualization  Lateral view    Oral Phase:   Prolonged mastication  Loss of bolus control to pyriform sinuses    Pharyngeal Phase:   Adequate base of tongue retraction  Reduced epiglottic deflection  Reduced hyolaryngeal excursion    Consistency Fed by Laryngeal Penetration Aspiration Residue   Thin liquid by spoon SLP None None None   Thin liquid by cup Self None None None   Thin liquid by straw Self During the swallow  Shallow, trace residue None None   Mildly thick liquid by straw Self During the swallow  Shallow, trace residue None None   Puree SLP None None None   Regular solid SLP None None None       Assessment     Patient exhibited mild oropharyngeal dysphagia characterized by the findings noted above.  Shallow laryngeal penetration observed, however with low  risk of aspiration.  SLP rec: soft and bite sized solids, thin liquids, slow rate, singular sips.      Outcome Measures     Functional Oral Intake Scale: 5 - Total oral diet with multiple consistencies, by requiring special preparation or compensations    Education     Patient provided with verbal education regarding SLP POC.  Understanding was verbalized.    Plan     SLP Follow-Up:  Yes    Plan of Care reviewed with:  patient     Time Tracking     SLP Treatment Date:   05/08/25  Speech Start Time:  1500  Speech Stop Time:  1520     Speech Total Time (min):  20 min    Billable minutes:   Motion Fluoroscopic Evaluation, Video Recording, 20 minutes     05/08/2025

## 2025-05-09 LAB
ALBUMIN SERPL-MCNC: 2.5 G/DL (ref 3.4–4.8)
ALBUMIN/GLOB SERPL: 0.8 RATIO (ref 1.1–2)
ALP SERPL-CCNC: 81 UNIT/L (ref 40–150)
ALT SERPL-CCNC: 19 UNIT/L (ref 0–55)
ANION GAP SERPL CALC-SCNC: 9 MEQ/L
AST SERPL-CCNC: 23 UNIT/L (ref 11–45)
BASOPHILS # BLD AUTO: 0.02 X10(3)/MCL
BASOPHILS NFR BLD AUTO: 0.1 %
BILIRUB SERPL-MCNC: 0.3 MG/DL
BUN SERPL-MCNC: 15.5 MG/DL (ref 9.8–20.1)
CALCIUM SERPL-MCNC: 9.6 MG/DL (ref 8.4–10.2)
CHLORIDE SERPL-SCNC: 94 MMOL/L (ref 98–107)
CO2 SERPL-SCNC: 30 MMOL/L (ref 23–31)
CREAT SERPL-MCNC: 0.77 MG/DL (ref 0.55–1.02)
CREAT/UREA NIT SERPL: 20
EOSINOPHIL # BLD AUTO: 0.34 X10(3)/MCL (ref 0–0.9)
EOSINOPHIL NFR BLD AUTO: 2.5 %
ERYTHROCYTE [DISTWIDTH] IN BLOOD BY AUTOMATED COUNT: 15.1 % (ref 11.5–17)
GFR SERPLBLD CREATININE-BSD FMLA CKD-EPI: >60 ML/MIN/1.73/M2
GLOBULIN SER-MCNC: 3.3 GM/DL (ref 2.4–3.5)
GLUCOSE SERPL-MCNC: 126 MG/DL (ref 82–115)
HCT VFR BLD AUTO: 33.8 % (ref 37–47)
HGB BLD-MCNC: 10.6 G/DL (ref 12–16)
IMM GRANULOCYTES # BLD AUTO: 0.22 X10(3)/MCL (ref 0–0.04)
IMM GRANULOCYTES NFR BLD AUTO: 1.6 %
LYMPHOCYTES # BLD AUTO: 1.59 X10(3)/MCL (ref 0.6–4.6)
LYMPHOCYTES NFR BLD AUTO: 11.9 %
MAGNESIUM SERPL-MCNC: 1.3 MG/DL (ref 1.6–2.6)
MCH RBC QN AUTO: 26.7 PG (ref 27–31)
MCHC RBC AUTO-ENTMCNC: 31.4 G/DL (ref 33–36)
MCV RBC AUTO: 85.1 FL (ref 80–94)
MONOCYTES # BLD AUTO: 0.76 X10(3)/MCL (ref 0.1–1.3)
MONOCYTES NFR BLD AUTO: 5.7 %
NEUTROPHILS # BLD AUTO: 10.45 X10(3)/MCL (ref 2.1–9.2)
NEUTROPHILS NFR BLD AUTO: 78.2 %
NRBC BLD AUTO-RTO: 0 %
PHOSPHATE SERPL-MCNC: 5.4 MG/DL (ref 2.3–4.7)
PLATELET # BLD AUTO: 301 X10(3)/MCL (ref 130–400)
PMV BLD AUTO: 9.6 FL (ref 7.4–10.4)
POTASSIUM SERPL-SCNC: 4.5 MMOL/L (ref 3.5–5.1)
PROT SERPL-MCNC: 5.8 GM/DL (ref 5.8–7.6)
RBC # BLD AUTO: 3.97 X10(6)/MCL (ref 4.2–5.4)
SODIUM SERPL-SCNC: 133 MMOL/L (ref 136–145)
WBC # BLD AUTO: 13.38 X10(3)/MCL (ref 4.5–11.5)

## 2025-05-09 PROCEDURE — 27000221 HC OXYGEN, UP TO 24 HOURS

## 2025-05-09 PROCEDURE — 85025 COMPLETE CBC W/AUTO DIFF WBC: CPT | Performed by: STUDENT IN AN ORGANIZED HEALTH CARE EDUCATION/TRAINING PROGRAM

## 2025-05-09 PROCEDURE — 97162 PT EVAL MOD COMPLEX 30 MIN: CPT

## 2025-05-09 PROCEDURE — 83735 ASSAY OF MAGNESIUM: CPT | Performed by: STUDENT IN AN ORGANIZED HEALTH CARE EDUCATION/TRAINING PROGRAM

## 2025-05-09 PROCEDURE — 80053 COMPREHEN METABOLIC PANEL: CPT | Performed by: STUDENT IN AN ORGANIZED HEALTH CARE EDUCATION/TRAINING PROGRAM

## 2025-05-09 PROCEDURE — 63600175 PHARM REV CODE 636 W HCPCS

## 2025-05-09 PROCEDURE — 97116 GAIT TRAINING THERAPY: CPT

## 2025-05-09 PROCEDURE — 25000003 PHARM REV CODE 250: Performed by: STUDENT IN AN ORGANIZED HEALTH CARE EDUCATION/TRAINING PROGRAM

## 2025-05-09 PROCEDURE — 63600175 PHARM REV CODE 636 W HCPCS: Performed by: STUDENT IN AN ORGANIZED HEALTH CARE EDUCATION/TRAINING PROGRAM

## 2025-05-09 PROCEDURE — 11000001 HC ACUTE MED/SURG PRIVATE ROOM

## 2025-05-09 PROCEDURE — 97166 OT EVAL MOD COMPLEX 45 MIN: CPT

## 2025-05-09 PROCEDURE — 25000242 PHARM REV CODE 250 ALT 637 W/ HCPCS: Performed by: STUDENT IN AN ORGANIZED HEALTH CARE EDUCATION/TRAINING PROGRAM

## 2025-05-09 PROCEDURE — 99900035 HC TECH TIME PER 15 MIN (STAT)

## 2025-05-09 PROCEDURE — 21400001 HC TELEMETRY ROOM

## 2025-05-09 PROCEDURE — 36415 COLL VENOUS BLD VENIPUNCTURE: CPT | Performed by: STUDENT IN AN ORGANIZED HEALTH CARE EDUCATION/TRAINING PROGRAM

## 2025-05-09 PROCEDURE — 84100 ASSAY OF PHOSPHORUS: CPT | Performed by: STUDENT IN AN ORGANIZED HEALTH CARE EDUCATION/TRAINING PROGRAM

## 2025-05-09 RX ADMIN — NYSTATIN AND TRIAMCINOLONE ACETONIDE: 100000; 1 CREAM TOPICAL at 08:05

## 2025-05-09 RX ADMIN — CEFEPIME 1 G: 1 INJECTION, POWDER, FOR SOLUTION INTRAMUSCULAR; INTRAVENOUS at 10:05

## 2025-05-09 RX ADMIN — VANCOMYCIN HYDROCHLORIDE 1000 MG: 1 INJECTION, POWDER, LYOPHILIZED, FOR SOLUTION INTRAVENOUS at 12:05

## 2025-05-09 RX ADMIN — POLYETHYLENE GLYCOL 3350 17 G: 17 POWDER, FOR SOLUTION ORAL at 08:05

## 2025-05-09 RX ADMIN — FLUTICASONE FUROATE AND VILANTEROL TRIFENATATE 1 PUFF: 100; 25 POWDER RESPIRATORY (INHALATION) at 08:05

## 2025-05-09 RX ADMIN — MUPIROCIN: 20 OINTMENT TOPICAL at 08:05

## 2025-05-09 RX ADMIN — NYSTATIN AND TRIAMCINOLONE ACETONIDE: 100000; 1 CREAM TOPICAL at 03:05

## 2025-05-09 RX ADMIN — CEFEPIME 1 G: 1 INJECTION, POWDER, FOR SOLUTION INTRAMUSCULAR; INTRAVENOUS at 08:05

## 2025-05-09 RX ADMIN — CEFEPIME 1 G: 1 INJECTION, POWDER, FOR SOLUTION INTRAMUSCULAR; INTRAVENOUS at 04:05

## 2025-05-09 RX ADMIN — NYSTATIN AND TRIAMCINOLONE ACETONIDE: 100000; 1 CREAM TOPICAL at 10:05

## 2025-05-09 RX ADMIN — CEFEPIME 1 G: 1 INJECTION, POWDER, FOR SOLUTION INTRAMUSCULAR; INTRAVENOUS at 02:05

## 2025-05-09 RX ADMIN — VANCOMYCIN HYDROCHLORIDE 1000 MG: 1 INJECTION, POWDER, LYOPHILIZED, FOR SOLUTION INTRAVENOUS at 11:05

## 2025-05-09 RX ADMIN — SENNOSIDES 8.6 MG: 8.6 TABLET, FILM COATED ORAL at 08:05

## 2025-05-09 RX ADMIN — MUPIROCIN: 20 OINTMENT TOPICAL at 10:05

## 2025-05-09 NOTE — PLAN OF CARE
Problem: Physical Therapy  Goal: Physical Therapy Goal  Description: Goals to be met by: 25     Patient will increase functional independence with mobility by performin. Supine to sit with Modified [place]  2. Sit to supine with Modified [place]  3. Sit to stand transfer with Modified [place]  4. Bed to chair transfer with Modified [place] using Rolling Walker  5. Gait  x 150 feet with Modified [place]er using Rolling Walker.     Outcome: Progressing

## 2025-05-09 NOTE — PT/OT/SLP EVAL
Occupational Therapy  Evaluation    Name: Shyanne Cannon  MRN: [id number]  Admitting Diagnosis: R lower lobe pneumonia 2/2 aspiration pneumonia, fall   Recent Surgery: * No surgery found *      Recommendations:     Discharge therapy intensity: Moderate Intensity Therapy   Discharge Equipment Recommendations:  bath bench, bedside commode  Barriers to discharge:  Decreased caregiver support    Assessment:     Shyanne Cannon is a 74 y.o. female with a medical diagnosis of R lower lobe pneumonia 2/2 aspiration pneumonia, fall, hx of parkinson's.  She presents with the following performance deficits affecting function: weakness, impaired endurance, impaired self care skills, impaired functional mobility, gait instability, impaired balance, decreased upper extremity function.   Pt ambulated to opposite side of room with RW, no LOB, slow but steady gait. Pt limited 2/2 weakness and low endurance today. Lives alone, frequent falls, and would benefit from increased therapy upon dc.     Rehab Prognosis: Good; patient would benefit from acute skilled OT services to address these deficits and reach maximum level of function.       Plan:     Patient to be seen 5 x/week to address the above listed problems via self-care/home management, therapeutic activities, therapeutic exercises  Plan of Care Expires: 06/09/25  Plan of Care Reviewed with: patient    Subjective     Chief Complaint: none   Patient/Family Comments/goals: get better     Occupational Profile:  Living Environment: pt lives alone, 1 story home, tub shower   Previous level of function: pt reports indep in ADLs and functional mobility using a RW  Roles and Routines: does not drive or work   Equipment Used at Home: walker, rolling, shower chair, grab bar, wheelchair  Assistance upon Discharge: none     Pain/Comfort:  Pain Rating 1: 0/10    Patients cultural, spiritual, Lutheran conflicts given the current situation:      Objective:     OT communicated with  nrsg prior to session.      Patient was found HOB elevated with bed alarm upon OT entry to room.    General Precautions: Standard,    Orthopedic Precautions:    Braces:      Vital Signs: Sp02: 3 liters, 92%    Bed Mobility:    Patient completed Supine to Sit with minimum assistance  Patient completed Sit to Supine with minimum assistance    Functional Mobility/Transfers:  Patient completed Bed <> Chair Transfer using Step Transfer technique with minimum assistance with rolling walker  Functional Mobility: no LOB; slow but steady     Activities of Daily Living:  Lower Body Dressing: maximal assistance    Toileting: maximal assistance purewick     AMPA 6 Click ADL:  AMPAC Total Score: 16    Functional Cognition:  Affect: Appropriate to situation and Cooperative  Orientation: oriented to Person, Place, Time, and Situation      Upper Extremity Function:  Right Upper Extremity:   Strength: 3+/5    Left Upper Extremity:  3+/5    Balance:   Dynamic standing balance:min A     Therapeutic Positioning  Risk for acquired pressure injuries is increased due to relative decrease in mobility d/t hospitalization  and altered skin already present.    OT interventions performed during the course of today's session:   Therapeutic positioning was provided at the conclusion of session to offload all bony prominences for the prevention and/or reduction of pressure injuries    Skin assessment: all bony prominences were assessed    Findings: known area of altered skin integrity at Bilateral elbows and sacrum    OT recommendations for therapeutic positioning throughout hospitalization:   Follow [facility] Pressure Injury Prevention Protocol  Geomat recommended for sacral protection while [facility]  Specialty Matt[employer]        Patient Education:  Patient provided with verbal education education regarding OT role/goals/POC, fall prevention, and safety awareness.  Understanding was verbalized.     Patient left up in chair with all lines intact, call  button in reach, chair alarm on, geomat cushion, and nrsg notified.    GOALS:   Multidisciplinary Problems       Occupational Therapy Goals          Problem: Occupational Therapy    Goal Priority Disciplines Outcome Interventions   Occupational Therapy Goal     OT, PT/OT Progressing    Description: Goals to be met by: in 1 month      Patient will increase functional independence with ADLs by performing:    UE Dressing with Modified Bryan.  LE Dressing with Modified Bryan.  Grooming while standing at sink with Modified Bryan.  Toileting from toilet with Modified Bryan for hygiene and clothing management.   Toilet transfer to toilet with Modified Bryan.  Increased functional strength to 4/5 for BUE.                         History:     No past medical history on file.    No past surgical history on file.    Time Tracking:     OT Date of Treatment:    OT Start Time: 0907  OT Stop Time: 0932  OT Total Time (min): 25 min    Billable Minutes:Evaluation Moderate Complexity     5/9/2025

## 2025-05-09 NOTE — PLAN OF CARE
Problem: Occupational Therapy  Goal: Occupational Therapy Goal  Description: Goals to be met by: in 1 month      Patient will increase functional independence with ADLs by performing:    UE Dressing with Modified [place].  LE Dressing with Modified [place].  Grooming while standing at sink with Modified [place].  Toileting from toilet with Modified [place] for hygiene and clothing management.   Toilet transfer to toilet with Modified [place].  Increased functional strength to 4/5 for BUE.    Outcome: Progressing

## 2025-05-09 NOTE — PLAN OF CARE
05/09/25 1008   Discharge Reassessment   Assessment Type Discharge Planning Reassessment   Discharge Plan discussed with: Patient   Discharge Plan A Skilled Nursing Facility   Discharge Plan B Home Health   Post-Acute Status   Post-Acute Authorization Placement   Post-Acute Placement Status Patient List Provided  (SNF Choice list provided)     Discussed SNF placement with patient. Patient is unsure if she would want to go to a facility for therapy. She does actively have home health with [facility]. She would like to discuss with daughter prior to making decision.

## 2025-05-09 NOTE — NURSING
RN attempted to call patient's daughter,  Cici Wooten twice through her mobile number: [phone] regarding patient's medication list. Daughter unavailable/not answering. Medication not reconciled.

## 2025-05-09 NOTE — PROGRESS NOTES
[facility] Hospital Medicine Progress Note        Chief Complaint: Inpatient Follow-up for     HPI: Shyanne Cannon is a 74 y.o. female who has a medical history of bipolar disorder, anxiety, parkinsonism, memory loss, hypertension, hyperlipidemia, overactive bladder, chronic constipation, and COPD.  The patient presented to [facility] on 5/7/2025 with a primary complaint of fall and weakness.  Patient mentions that she has indoor 2 falls in last 4 days.  Yesterday, she had a fall as she was grabbing a picture of ice tea from the Fridge.  She fell to the ground and drop the entire patient advised to be on herself and the floor. After falling, she was too weak to stand back up to her feet so she laid there until her daughter came home.  She mentions that she lives alone and moves around with a rolling walker.  Daughter does daily wellness checks.  Patient denies any fever, chills, body aches, nausea, vomiting, loss his consciousness, head trauma, loss of bowel or bladder, or cough.     In the ED, vital signs were found to be normal.  CBC shows WBC count of 33 K. CMP is unremarkable.  CPK slightly elevated at 1443.  Troponin and lactic acid are WNL.  CT head shows no acute intracranial pathology.  CT abdomen pelvis shows no posttraumatic abnormality of the abdomen and pelvis.  CT spine shows no acute osseous abnormality, however ground-glass opacities are noted in the right lower lobe.  Blood cultures, urine cultures, sputum cultures pending.       Continue with vancomycin and cefepime, add DuoNebs p.r.n., Breo Ellipta.  SLP, PT/OT, and cm consulted.  Follow up cultures and narrow antibiotics as indicated.    Interval Hx:    Lying in bed, alert and oriented. She is concerned about going to a facility await from home but she knows she is weak.  MBS performed, awaiting results    Case was discussed with patient's nurse and  on the floor.    Objective/physical exam:  General: In  no acute distress, afebrile  Chest: Clear to auscultation bilaterally  Heart: RRR, +S1, S2, no appreciable murmur  Abdomen: Soft, nontender, BS +  MSK: Warm, no lower extremity edema, no clubbing or cyanosis  Neurologic: Alert and oriented x4, Cranial nerve II-XII intact, Strength 5/5 in all 4 extremities    VITAL SIGNS: 24 HRS MIN & MAX LAST   Temp  Min: 97.6 °F (36.4 °C)  Max: 98.4 °F (36.9 °C) 97.8 °F (36.6 °C)   BP  Min: 104/63  Max: 134/76 107/67   Pulse  Min: 83  Max: 96  83   Resp  Min: 18  Max: 20 20   SpO2  Min: 92 %  Max: 95 % (!) 92 %     I have reviewed the following labs:  Recent Labs   Lab 05/07/25 1959 05/08/25 0514 05/09/25  0656   WBC 33.77  33.77* 23.13* 13.38*   RBC 4.48 4.05* 3.97*   HGB 12.0 10.9* 10.6*   HCT 38.4 35.0* 33.8*   MCV 85.7 86.4 85.1   MCH 26.8* 26.9* 26.7*   MCHC 31.3* 31.1* 31.4*   RDW 15.3 15.3 15.1    294 301   MPV 9.5 9.7 9.6     Recent Labs   Lab 05/07/25 1959 05/08/25 0514 05/09/25  0656   * 132* 133*   K 4.3 4.0 4.5   CL 93* 95* 94*   CO2 31 30 30   BUN 24.9* 23.1* 15.5   CREATININE 1.07* 0.81 0.77   * 99 126*   CALCIUM 9.8 9.5 9.6   MG  --  1.60 1.30*   ALBUMIN 3.1* 2.8* 2.5*   PROT 6.6 6.0 5.8   ALKPHOS 96 91 81   ALT 19 17 19   AST 44 35 23   BILITOT 0.4 0.4 0.3     Microbiology Results (last 7 days)       Procedure Component Value Units Date/Time    Blood culture #2 **CANNOT BE ORDERED STAT** [[id number]]  (Normal) Collected: 05/07/25 2208    Order Status: Completed Specimen: Blood Updated: 05/09/25 0120     Blood Culture No Growth At 24 Hours    Blood culture #1 **CANNOT BE ORDERED STAT** [[id number]]  (Normal) Collected: 05/07/25 2208    Order Status: Completed Specimen: Blood Updated: 05/09/25 0120     Blood Culture No Growth At 24 Hours    Respiratory Culture [[id number]]     Order Status: Sent Specimen: Sputum              See below for Radiology    Right lower lobe pneumonia; possibly due to aspiration pneumonia  Fall and  weakness  Leukocytosis - improving     Other medical history includes bipolar disorder, anxiety, parkinsonism, memory loss, hypertension, hyperlipidemia, overactive bladder, chronic constipation, COPD     -CT completed head to toe which shows no acute abnormalities except for right lower lung pneumonia - ST recs soft diet with thin liquids, MBS pending  -started on vancomycin and cefepime; treat for 5-7 days-->de-escalate tomorrow if cultures negative  -follow up with blood, urine, sputum culture  -daily labs  -SLP, PT/OT, and cm consulted  -bowel regimen        VTE Prophylaxis:  Lovenox     Patient condition:  Stable    Anticipated discharge and Disposition:  Likely will need SNF, awaiting MBS results      All diagnosis and differential diagnosis have been reviewed; assessment and plan has been documented; I have personally reviewed the labs and test results that are presently available; I have reviewed the patients medication list; I have reviewed the consulting providers response and recommendations. I have reviewed or attempted to review medical records based upon their availability    All of the patient's questions have been  addressed and answered. Patient's is agreeable to the above stated plan. I will continue to monitor closely and make adjustments to medical management as needed.    Portions of this note dictated using EMR integrated voice recognition software, and may be subject to voice recognition errors not corrected at proofreading. Please contact writer for clarification if needed.   _____________________________________________________________________    Malnutrition Status:  Nutrition consulted. Most recent weight and BMI monitored-     Measurements:  Wt Readings from Last 1 Encounters:   05/08/25 91.9 kg (202 lb 9.6 oz)   Body mass index is 34.24 kg/m².    Patient has been screened and assessed by RD.    Malnutrition Type:  Context:    Level:      Malnutrition Characteristic Summary:        Interventions/Recommendations (treatment strategy):        Scheduled Med:   ceFEPime IV (PEDS and ADULTS)  1 g Intravenous Q6H    fluticasone furoate-vilanteroL  1 puff Inhalation Daily    mupirocin   Nasal BID    nystatin-triamcinolone   Topical (Top) TID    polyethylene glycol  17 g Oral Daily    senna  8.6 mg Oral Daily    vancomycin (VANCOCIN) 1,000 mg in D5W 250 mL IVPB (admixture device)  1,000 mg Intravenous Q12H      Continuous Infusions:     PRN Meds:    Current Facility-Administered Medications:     acetaminophen, 650 mg, Oral, Q4H PRN    albuterol-ipratropium, 3 mL, Nebulization, Q4H PRN    aluminum-magnesium hydroxide-simethicone, 30 mL, Oral, QID PRN    bisacodyL, 10 mg, Rectal, Daily PRN    dextrose 50%, 12.5 g, Intravenous, PRN    dextrose 50%, 25 g, Intravenous, PRN    glucagon (human recombinant), 1 mg, Intramuscular, PRN    glucose, 16 g, Oral, PRN    glucose, 24 g, Oral, PRN    melatonin, 6 mg, Oral, Nightly PRN    sodium chloride 0.9%, 10 mL, Intravenous, PRN    vancomycin - pharmacy to dose, , Intravenous, pharmacy to manage frequency     Radiology:  I have personally reviewed the following imaging and agree with the radiologist.     Fl Modified Barium Swallow Speech  See procedure notes from Speech Pathologist.    This procedure was auto-finalized.  CT Chest Abdomen Pelvis With IV Contrast (XPD) NO Oral Contrast  Narrative: EXAMINATION:  CT CHEST ABDOMEN PELVIS WITH IV CONTRAST (XPD)    CLINICAL HISTORY:  Trauma;    TECHNIQUE:  Axial images of the chest abdomen and pelvis were obtained with IV contrast administration.  Coronal and sagittal reconstructions were provided.  Three dimensional and MIP images were obtained and evaluated.  Total DLP was 2819 mGy-cm. Dose lowering technique and automated exposure control were utilized for this exam.    COMPARISON:  CT of the abdomen and pelvis 09/28/2020.    FINDINGS:  There is no traumatic aortic injury.  There is no active extravasation.  There  is no pneumothorax.  There is no free fluid in the pelvis.    The airway is widely patent.  There is no mediastinal or hilar lymphadenopathy.  There is no central pulmonary embolus.  The heart is not enlarged.  There are extensive poorly defined ground-glass opacities throughout the right lung and to a lesser extent the left lower lobe.  There is no pleural effusion or hemothorax.  There is no pulmonary nodule or mass.    The liver, spleen, pancreas, adrenal glands and kidneys are normal.  There is no solid organ laceration.  The gallbladder is surgically absent.  The stomach and small bowel are decompressed.  The colon is normal.  The uterus and adnexa are normal for age.  The urinary bladder is normal.  There is no pelvic or retroperitoneal lymphadenopathy.  The aorta is nonaneurysmal.  There is no lytic or blastic osseous lesion.  There is no fracture.  Impression: 1. Poorly defined ground-glass opacities throughout the right greater than left lung reflecting multifocal pneumonia versus aspiration pneumonitis.  2. No posttraumatic abnormality of the abdomen and pelvis.  [place] concordance    Electronically signed by: Felix Castañeda MD  Date:    05/08/2025  Time:    07:43  CT Cervical Spine Without Contrast  Narrative: EXAMINATION:  CT CERVICAL SPINE WITHOUT CONTRAST    CLINICAL HISTORY:  Trauma;    TECHNIQUE:  Low dose axial images, sagittal and coronal reformations were performed though the cervical spine. Contrast was not administered. Dose reduction techniques including automatic exposure control (AEC) were utilized.    Dose (DLP): 787 mGycm    COMPARISON:  CT cervical spine 09/23/2020    FINDINGS:  Straightening of the normal cervical lordosis.  No subluxation.  No acute fracture.  No aggressive or destructive osseous abnormality.  Moderate multilevel cervical disc degeneration.  Mild multilevel facet joint degeneration.  No significant central canal or foraminal stenosis.  No acute paraspinous soft tissue  abnormality.  Mixture of ground-glass opacity and consolidation in the right lung apex.  Impression: No acute osseous abnormality.    Mixture of ground-glass opacity and consolidation in the partially imaged right lung apex.  Please refer to concurrent CT chest, abdomen, and pelvis report for full details.    I agree with the preliminary report    Electronically signed by: Senthil Paul  Date:    05/08/2025  Time:    07:19  CT Head Without Contrast  Narrative: EXAMINATION:  CT HEAD WITHOUT CONTRAST    CLINICAL HISTORY:  Trauma;    TECHNIQUE:  Low dose axial images were obtained through the head.  Coronal and sagittal reformations were also performed. Contrast was not administered.  Dose reduction techniques including automatic exposure control (AEC) were utilized.    Dose (DLP): 1184 mGycm    COMPARISON:  MR brain 12/19/2024.  CT head 12/18/2024.    FINDINGS:  INTRACRANIAL: Brain parenchyma attenuation and configuration is unremarkable.  No acute intracranial hemorrhage.  No hydrocephalus.  No intracranial mass effect.    SINUSES: Visualized paranasal sinuses and mastoids are clear.    SKULL/SCALP: Visualized osseous structures are normal.    ORBITS: Visualized orbits are normal.  Impression: No acute intracranial pathology.    No significant discrepancy between preliminary and final reports.    Electronically signed by: Senthil Paul  Date:    05/08/2025  Time:    07:13      Maria Teresa Osman MD  Department of Hospital Medicine   [facility]   05/09/2025

## 2025-05-09 NOTE — PT/OT/SLP EVAL
Physical Therapy Evaluation    Patient Name:  Shyanne Cannon   MRN:  [id number]    Recommendations:     Discharge therapy intensity: Moderate Intensity Therapy   Discharge Equipment Recommendations: bath bench, bedside commode   Barriers to discharge: Impaired mobility    Assessment:     Shyanne Cannon is a 74 y.o. female admitted with a medical diagnosis of  R lower lobe pneumonia 2/2 aspiration pneumonia, fall, hx of parkinson's.  She presents with the following impairments/functional limitations: weakness, gait instability, impaired balance, impaired endurance, decreased safety awareness, impaired functional mobility. The pt tolerated session well. She is able to perform all mobility with min A and RW, decreased magdalene. Pt lives alone and has had frequent falls. Pt would benefit from MOD intensity PT upon dc 2/2 frequent falls and living alone.    Rehab Prognosis: Good; patient would benefit from acute skilled PT services to address these deficits and reach maximum level of function.    Recent Surgery: * No surgery found *      Plan:     During this hospitalization, patient would benefit from acute PT services 5 x/week to address the identified rehab impairments via gait training, therapeutic activities, therapeutic exercises and progress toward the following goals:    Plan of Care Expires:  06/09/25    Subjective     Chief Complaint: none  Patient/Family Comments/goals: return to PLOF  Pain/Comfort:       Patients cultural, spiritual, Jain conflicts given the current situation:      Living Environment:  Home alone, SLH, no steps to enter.  Prior to admission, patients level of function was independent.  Equipment used at home: walker, rolling, shower chair, grab bar, wheelchair.  DME owned (not currently used): rolling walker.  Upon discharge, patient will have assistance from none.    Objective:     Communicated with NSG prior to session.  Patient found supine with bed alarm  upon PT entry to  room.    General Precautions: Standard, aspiration  Orthopedic Precautions:N/A   Braces: N/A  Respiratory Status: Room air  Blood Pressure: NA      Exams:  RLE ROM: WFL  RLE Strength: WFL  LLE ROM: WFL  LLE Strength: WFL  Skin integrity: Visible skin intact      Functional Mobility:  Bed Mobility:     Supine to Sit: minimum assistance  Sit to Supine: minimum assistance  Transfers:     Sit to Stand:  minimum assistance with rolling walker  Gait: pt ambulates x min A and RW x 20 feet, decreased magdalene.      Patient provided with verbal education education regarding PT role/goals/POC.  Understanding was verbalized.     Patient left supine with all lines intact, call button in reach, and NSG notified.    GOALS:   Multidisciplinary Problems       Physical Therapy Goals          Problem: Physical Therapy    Goal Priority Disciplines Outcome Interventions   Physical Therapy Goal     PT, PT/OT Progressing    Description: Goals to be met by: 25     Patient will increase functional independence with mobility by performin. Supine to sit with Modified [place]  2. Sit to supine with Modified [place]  3. Sit to stand transfer with Modified [place]  4. Bed to chair transfer with Modified [place] using Rolling Walker  5. Gait  x 150 feet with Modified [place] using Rolling Walker.                          History:     No past medical history on file.    No past surgical history on file.    Time Tracking:     PT Received On: 25  PT Start Time: 908     PT Stop Time: 931  PT Total Time (min): 23 min     Billable Minutes: Evaluation 15 and Gait Training 8      2025

## 2025-05-09 NOTE — PT/OT/SLP PROGRESS
[facility]  Speech Language Pathology Department  Diet Tolerance Follow-up    Patient Name:  Shyanne Cannon   MRN:  [id number]      Recommendations     General recommendations:  SLP intervention not indicated  Solid texture recommendation:  Soft & Bite Sized Diet - IDDSI Level 6  Liquid consistency recommendation: Thin liquids - IDDSI Level 0   Medications: per patient preference  Swallow strategies/precautions: small bites/sips, slow rate, upright for PO intake, assist with feeding as needed, and feed only when fully alert  Precautions: aspiration    Diet Tolerance     Nursing reports no difficulty regarding diet tolerance.    Outcome Measures     Functional Oral Intake Scale: 5 - Total oral diet with multiple consistencies, by requiring special preparation or compensations    Plan     Patient is tolerating baseline diet. SLP services are not warranted, SLP to sign off. Please reconsult if needed.    05/09/2025

## 2025-05-10 LAB
ANION GAP SERPL CALC-SCNC: 8 MEQ/L
BUN SERPL-MCNC: 14 MG/DL (ref 9.8–20.1)
CALCIUM SERPL-MCNC: 10.5 MG/DL (ref 8.4–10.2)
CHLORIDE SERPL-SCNC: 90 MMOL/L (ref 98–107)
CK SERPL-CCNC: 68 U/L (ref 29–168)
CO2 SERPL-SCNC: 36 MMOL/L (ref 23–31)
CREAT SERPL-MCNC: 0.78 MG/DL (ref 0.55–1.02)
CREAT/UREA NIT SERPL: 18
GFR SERPLBLD CREATININE-BSD FMLA CKD-EPI: >60 ML/MIN/1.73/M2
GLUCOSE SERPL-MCNC: 134 MG/DL (ref 82–115)
POTASSIUM SERPL-SCNC: 5 MMOL/L (ref 3.5–5.1)
SODIUM SERPL-SCNC: 134 MMOL/L (ref 136–145)

## 2025-05-10 PROCEDURE — 25000003 PHARM REV CODE 250: Performed by: INTERNAL MEDICINE

## 2025-05-10 PROCEDURE — 80048 BASIC METABOLIC PNL TOTAL CA: CPT | Performed by: INTERNAL MEDICINE

## 2025-05-10 PROCEDURE — 94660 CPAP INITIATION&MGMT: CPT

## 2025-05-10 PROCEDURE — 21400001 HC TELEMETRY ROOM

## 2025-05-10 PROCEDURE — 99900035 HC TECH TIME PER 15 MIN (STAT)

## 2025-05-10 PROCEDURE — 63600175 PHARM REV CODE 636 W HCPCS: Performed by: INTERNAL MEDICINE

## 2025-05-10 PROCEDURE — 94760 N-INVAS EAR/PLS OXIMETRY 1: CPT

## 2025-05-10 PROCEDURE — 63600175 PHARM REV CODE 636 W HCPCS

## 2025-05-10 PROCEDURE — 36415 COLL VENOUS BLD VENIPUNCTURE: CPT | Performed by: INTERNAL MEDICINE

## 2025-05-10 PROCEDURE — 27000190 HC CPAP FULL FACE MASK W/VALVE

## 2025-05-10 PROCEDURE — 27000221 HC OXYGEN, UP TO 24 HOURS

## 2025-05-10 PROCEDURE — 25000242 PHARM REV CODE 250 ALT 637 W/ HCPCS: Performed by: STUDENT IN AN ORGANIZED HEALTH CARE EDUCATION/TRAINING PROGRAM

## 2025-05-10 PROCEDURE — 25000003 PHARM REV CODE 250: Performed by: STUDENT IN AN ORGANIZED HEALTH CARE EDUCATION/TRAINING PROGRAM

## 2025-05-10 PROCEDURE — 82550 ASSAY OF CK (CPK): CPT | Performed by: INTERNAL MEDICINE

## 2025-05-10 PROCEDURE — 93010 ELECTROCARDIOGRAM REPORT: CPT | Mod: ,,, | Performed by: INTERNAL MEDICINE

## 2025-05-10 PROCEDURE — 11000001 HC ACUTE MED/SURG PRIVATE ROOM

## 2025-05-10 RX ORDER — TROSPIUM CHLORIDE 20 MG/1
20 TABLET, FILM COATED ORAL 2 TIMES DAILY
Status: ON HOLD | COMMUNITY
Start: 2025-04-07 | End: 2026-04-07

## 2025-05-10 RX ORDER — FLUTICASONE PROPIONATE AND SALMETEROL 100; 50 UG/1; UG/1
1 POWDER RESPIRATORY (INHALATION) 2 TIMES DAILY
Status: ON HOLD | COMMUNITY

## 2025-05-10 RX ORDER — NYSTATIN 100000 U/G
1 OINTMENT TOPICAL 2 TIMES DAILY
Status: ON HOLD | COMMUNITY

## 2025-05-10 RX ORDER — ALPRAZOLAM 0.5 MG/1
0.5 TABLET ORAL NIGHTLY PRN
Status: ON HOLD | COMMUNITY
Start: 2025-05-01

## 2025-05-10 RX ORDER — BENZTROPINE MESYLATE 1 MG/1
1 TABLET ORAL DAILY
Status: ON HOLD | COMMUNITY
Start: 2025-04-10

## 2025-05-10 RX ORDER — OMEPRAZOLE 40 MG/1
40 CAPSULE, DELAYED RELEASE ORAL EVERY MORNING
Status: ON HOLD | COMMUNITY

## 2025-05-10 RX ORDER — ATORVASTATIN CALCIUM 40 MG/1
40 TABLET, FILM COATED ORAL NIGHTLY
Status: ON HOLD | COMMUNITY
Start: 2025-04-10

## 2025-05-10 RX ORDER — DOXEPIN HYDROCHLORIDE 50 MG/1
50 CAPSULE ORAL NIGHTLY
Status: ON HOLD | COMMUNITY

## 2025-05-10 RX ORDER — DONEPEZIL HYDROCHLORIDE 10 MG/1
10 TABLET, FILM COATED ORAL NIGHTLY
Status: ON HOLD | COMMUNITY
Start: 2025-04-10

## 2025-05-10 RX ORDER — OLANZAPINE 20 MG/1
20 TABLET, FILM COATED ORAL NIGHTLY
Status: ON HOLD | COMMUNITY

## 2025-05-10 RX ORDER — TIOTROPIUM BROMIDE 18 UG/1
18 CAPSULE ORAL; RESPIRATORY (INHALATION) DAILY
Status: ON HOLD | COMMUNITY
Start: 2025-01-06

## 2025-05-10 RX ORDER — IBUPROFEN 800 MG/1
800 TABLET, FILM COATED ORAL DAILY PRN
Status: ON HOLD | COMMUNITY

## 2025-05-10 RX ORDER — ASPIRIN 325 MG
50000 TABLET, DELAYED RELEASE (ENTERIC COATED) ORAL
Status: ON HOLD | COMMUNITY
Start: 2024-12-13 | End: 2025-05-19 | Stop reason: HOSPADM

## 2025-05-10 RX ORDER — GABAPENTIN 800 MG/1
800 TABLET ORAL 3 TIMES DAILY
Status: ON HOLD | COMMUNITY
End: 2025-05-19 | Stop reason: HOSPADM

## 2025-05-10 RX ORDER — LORATADINE 10 MG/1
10 TABLET ORAL DAILY
Status: ON HOLD | COMMUNITY
Start: 2025-03-18

## 2025-05-10 RX ORDER — FERROUS SULFATE 325(65) MG
325 TABLET, DELAYED RELEASE (ENTERIC COATED) ORAL DAILY
Status: ON HOLD | COMMUNITY
Start: 2025-04-10

## 2025-05-10 RX ORDER — TIRZEPATIDE 5 MG/.5ML
5 INJECTION, SOLUTION SUBCUTANEOUS WEEKLY
Status: ON HOLD | COMMUNITY
Start: 2025-04-10

## 2025-05-10 RX ORDER — LANOLIN ALCOHOL/MO/W.PET/CERES
400 CREAM (GRAM) TOPICAL DAILY
Status: ON HOLD | COMMUNITY
Start: 2025-01-06

## 2025-05-10 RX ORDER — DULOXETIN HYDROCHLORIDE 60 MG/1
60 CAPSULE, DELAYED RELEASE ORAL EVERY MORNING
Status: ON HOLD | COMMUNITY

## 2025-05-10 RX ORDER — METFORMIN HYDROCHLORIDE 500 MG/1
500 TABLET ORAL 2 TIMES DAILY WITH MEALS
Status: ON HOLD | COMMUNITY

## 2025-05-10 RX ORDER — ALBUTEROL SULFATE 90 UG/1
2 INHALANT RESPIRATORY (INHALATION) EVERY 6 HOURS PRN
Status: ON HOLD | COMMUNITY
End: 2025-05-10

## 2025-05-10 RX ORDER — FLUTICASONE PROPIONATE AND SALMETEROL 250; 50 UG/1; UG/1
1 POWDER RESPIRATORY (INHALATION) 2 TIMES DAILY
Status: ON HOLD | COMMUNITY

## 2025-05-10 RX ADMIN — NYSTATIN AND TRIAMCINOLONE ACETONIDE: 100000; 1 CREAM TOPICAL at 02:05

## 2025-05-10 RX ADMIN — NYSTATIN AND TRIAMCINOLONE ACETONIDE: 100000; 1 CREAM TOPICAL at 09:05

## 2025-05-10 RX ADMIN — CEFEPIME 1 G: 1 INJECTION, POWDER, FOR SOLUTION INTRAMUSCULAR; INTRAVENOUS at 06:05

## 2025-05-10 RX ADMIN — FLUTICASONE FUROATE AND VILANTEROL TRIFENATATE 1 PUFF: 100; 25 POWDER RESPIRATORY (INHALATION) at 09:05

## 2025-05-10 RX ADMIN — SENNOSIDES 8.6 MG: 8.6 TABLET, FILM COATED ORAL at 09:05

## 2025-05-10 RX ADMIN — POLYETHYLENE GLYCOL 3350 17 G: 17 POWDER, FOR SOLUTION ORAL at 09:05

## 2025-05-10 RX ADMIN — CEFEPIME 1 G: 1 INJECTION, POWDER, FOR SOLUTION INTRAMUSCULAR; INTRAVENOUS at 11:05

## 2025-05-10 RX ADMIN — MUPIROCIN: 20 OINTMENT TOPICAL at 08:05

## 2025-05-10 RX ADMIN — MUPIROCIN: 20 OINTMENT TOPICAL at 09:05

## 2025-05-10 RX ADMIN — AZITHROMYCIN MONOHYDRATE 500 MG: 500 INJECTION, POWDER, LYOPHILIZED, FOR SOLUTION INTRAVENOUS at 11:05

## 2025-05-10 NOTE — PLAN OF CARE
Problem: Adult Inpatient Plan of Care  Goal: Plan of Care Review  5/10/2025 0825 by Hiwot Rosas RN  Outcome: Progressing  5/10/2025 0824 by Hiwot Rosas RN  Outcome: Progressing  Goal: Patient-Specific Goal (Individualized)  Outcome: Progressing  Goal: Absence of Hospital-Acquired Illness or Injury  Outcome: Progressing  Goal: Optimal Comfort and Wellbeing  Outcome: Progressing  Goal: Readiness for Transition of Care  Outcome: Progressing     Problem: Wound  Goal: Optimal Coping  Outcome: Progressing  Goal: Optimal Functional Ability  Outcome: Progressing  Goal: Absence of Infection Signs and Symptoms  Outcome: Progressing  Goal: Improved Oral Intake  Outcome: Progressing  Goal: Optimal Pain Control and Function  Outcome: Progressing  Goal: Skin Health and Integrity  Outcome: Progressing  Goal: Optimal Wound Healing  Outcome: Progressing     Problem: Skin Injury Risk Increased  Goal: Skin Health and Integrity  Outcome: Progressing

## 2025-05-11 LAB
ALLENS TEST BLOOD GAS (OHS): YES
ANION GAP SERPL CALC-SCNC: 11 MEQ/L
BASE EXCESS BLD CALC-SCNC: 13.4 MMOL/L (ref -2–2)
BLOOD GAS SAMPLE TYPE (OHS): ABNORMAL
BNP BLD-MCNC: 31.6 PG/ML
BUN SERPL-MCNC: 19.3 MG/DL (ref 9.8–20.1)
CA-I BLD-SCNC: 1.25 MMOL/L (ref 1.12–1.23)
CALCIUM SERPL-MCNC: 10.3 MG/DL (ref 8.4–10.2)
CHLORIDE SERPL-SCNC: 91 MMOL/L (ref 98–107)
CO2 BLDA-SCNC: 41.5 MMOL/L
CO2 SERPL-SCNC: 31 MMOL/L (ref 23–31)
COHGB MFR BLDA: 1.7 % (ref 0.5–1.5)
CREAT SERPL-MCNC: 0.68 MG/DL (ref 0.55–1.02)
CREAT/UREA NIT SERPL: 28
DRAWN BY BLOOD GAS (OHS): ABNORMAL
GFR SERPLBLD CREATININE-BSD FMLA CKD-EPI: >60 ML/MIN/1.73/M2
GLUCOSE SERPL-MCNC: 110 MG/DL (ref 82–115)
HCO3 BLDA-SCNC: 39.8 MMOL/L (ref 22–26)
LPM (OHS): 4
METHGB MFR BLDA: 1.1 % (ref 0.4–1.5)
O2 HB BLOOD GAS (OHS): 91.9 % (ref 94–97)
OHS QRS DURATION: 104 MS
OHS QTC CALCULATION: 445 MS
OXYGEN DEVICE BLOOD GAS (OHS): ABNORMAL
OXYHGB MFR BLDA: 14 G/DL (ref 12–16)
PCO2 BLDA: 56 MMHG (ref 35–45)
PH BLDA: 7.46 [PH] (ref 7.35–7.45)
PO2 BLDA: 65 MMHG (ref 80–100)
POTASSIUM BLOOD GAS (OHS): 3.9 MMOL/L (ref 3.5–5)
POTASSIUM SERPL-SCNC: 4.7 MMOL/L (ref 3.5–5.1)
SAMPLE SITE BLOOD GAS (OHS): ABNORMAL
SAO2 % BLDA: 93.5 %
SODIUM BLOOD GAS (OHS): 125 MMOL/L (ref 137–145)
SODIUM SERPL-SCNC: 133 MMOL/L (ref 136–145)

## 2025-05-11 PROCEDURE — 25000242 PHARM REV CODE 250 ALT 637 W/ HCPCS: Performed by: STUDENT IN AN ORGANIZED HEALTH CARE EDUCATION/TRAINING PROGRAM

## 2025-05-11 PROCEDURE — 99900031 HC PATIENT EDUCATION (STAT)

## 2025-05-11 PROCEDURE — 36415 COLL VENOUS BLD VENIPUNCTURE: CPT | Performed by: INTERNAL MEDICINE

## 2025-05-11 PROCEDURE — 94640 AIRWAY INHALATION TREATMENT: CPT

## 2025-05-11 PROCEDURE — 82803 BLOOD GASES ANY COMBINATION: CPT

## 2025-05-11 PROCEDURE — 80048 BASIC METABOLIC PNL TOTAL CA: CPT | Performed by: INTERNAL MEDICINE

## 2025-05-11 PROCEDURE — 63600175 PHARM REV CODE 636 W HCPCS

## 2025-05-11 PROCEDURE — 21400001 HC TELEMETRY ROOM

## 2025-05-11 PROCEDURE — 83880 ASSAY OF NATRIURETIC PEPTIDE: CPT | Performed by: INTERNAL MEDICINE

## 2025-05-11 PROCEDURE — 5A09357 ASSISTANCE WITH RESPIRATORY VENTILATION, LESS THAN 24 CONSECUTIVE HOURS, CONTINUOUS POSITIVE AIRWAY PRESSURE: ICD-10-PCS | Performed by: INTERNAL MEDICINE

## 2025-05-11 PROCEDURE — 94761 N-INVAS EAR/PLS OXIMETRY MLT: CPT

## 2025-05-11 PROCEDURE — 11000001 HC ACUTE MED/SURG PRIVATE ROOM

## 2025-05-11 PROCEDURE — 25000242 PHARM REV CODE 250 ALT 637 W/ HCPCS: Performed by: INTERNAL MEDICINE

## 2025-05-11 PROCEDURE — 25000003 PHARM REV CODE 250: Performed by: STUDENT IN AN ORGANIZED HEALTH CARE EDUCATION/TRAINING PROGRAM

## 2025-05-11 PROCEDURE — 63600175 PHARM REV CODE 636 W HCPCS: Mod: JZ,TB | Performed by: INTERNAL MEDICINE

## 2025-05-11 PROCEDURE — 94660 CPAP INITIATION&MGMT: CPT

## 2025-05-11 PROCEDURE — 94760 N-INVAS EAR/PLS OXIMETRY 1: CPT

## 2025-05-11 PROCEDURE — 36600 WITHDRAWAL OF ARTERIAL BLOOD: CPT

## 2025-05-11 PROCEDURE — 25000003 PHARM REV CODE 250: Performed by: INTERNAL MEDICINE

## 2025-05-11 PROCEDURE — 99900035 HC TECH TIME PER 15 MIN (STAT)

## 2025-05-11 PROCEDURE — 27100171 HC OXYGEN HIGH FLOW UP TO 24 HOURS

## 2025-05-11 PROCEDURE — 27000190 HC CPAP FULL FACE MASK W/VALVE

## 2025-05-11 PROCEDURE — 80307 DRUG TEST PRSMV CHEM ANLYZR: CPT | Performed by: INTERNAL MEDICINE

## 2025-05-11 RX ORDER — IPRATROPIUM BROMIDE AND ALBUTEROL SULFATE 2.5; .5 MG/3ML; MG/3ML
3 SOLUTION RESPIRATORY (INHALATION) EVERY 4 HOURS
Status: DISCONTINUED | OUTPATIENT
Start: 2025-05-11 | End: 2025-05-19 | Stop reason: HOSPADM

## 2025-05-11 RX ADMIN — IPRATROPIUM BROMIDE AND ALBUTEROL SULFATE 3 ML: .5; 3 SOLUTION RESPIRATORY (INHALATION) at 03:05

## 2025-05-11 RX ADMIN — SENNOSIDES 8.6 MG: 8.6 TABLET, FILM COATED ORAL at 10:05

## 2025-05-11 RX ADMIN — POLYETHYLENE GLYCOL 3350 17 G: 17 POWDER, FOR SOLUTION ORAL at 10:05

## 2025-05-11 RX ADMIN — NYSTATIN AND TRIAMCINOLONE ACETONIDE: 100000; 1 CREAM TOPICAL at 10:05

## 2025-05-11 RX ADMIN — FLUTICASONE FUROATE AND VILANTEROL TRIFENATATE 1 PUFF: 100; 25 POWDER RESPIRATORY (INHALATION) at 10:05

## 2025-05-11 RX ADMIN — CEFEPIME 1 G: 1 INJECTION, POWDER, FOR SOLUTION INTRAMUSCULAR; INTRAVENOUS at 12:05

## 2025-05-11 RX ADMIN — NYSTATIN AND TRIAMCINOLONE ACETONIDE: 100000; 1 CREAM TOPICAL at 03:05

## 2025-05-11 RX ADMIN — AZITHROMYCIN MONOHYDRATE 500 MG: 500 INJECTION, POWDER, LYOPHILIZED, FOR SOLUTION INTRAVENOUS at 10:05

## 2025-05-11 RX ADMIN — METHYLPREDNISOLONE SODIUM SUCCINATE 80 MG: 40 INJECTION, POWDER, FOR SOLUTION INTRAMUSCULAR; INTRAVENOUS at 06:05

## 2025-05-11 RX ADMIN — IPRATROPIUM BROMIDE AND ALBUTEROL SULFATE 3 ML: .5; 3 SOLUTION RESPIRATORY (INHALATION) at 12:05

## 2025-05-11 RX ADMIN — IPRATROPIUM BROMIDE AND ALBUTEROL SULFATE 3 ML: .5; 3 SOLUTION RESPIRATORY (INHALATION) at 08:05

## 2025-05-11 RX ADMIN — MUPIROCIN: 20 OINTMENT TOPICAL at 08:05

## 2025-05-11 RX ADMIN — CEFEPIME 1 G: 1 INJECTION, POWDER, FOR SOLUTION INTRAMUSCULAR; INTRAVENOUS at 06:05

## 2025-05-11 RX ADMIN — NYSTATIN AND TRIAMCINOLONE ACETONIDE: 100000; 1 CREAM TOPICAL at 08:05

## 2025-05-11 RX ADMIN — MUPIROCIN: 20 OINTMENT TOPICAL at 10:05

## 2025-05-11 NOTE — PROGRESS NOTES
[facility] Hospital Medicine Progress Note        Chief Complaint: Inpatient Follow-up for     HPI: Shyanne Cannon is a 74 y.o. female who has a medical history of bipolar disorder, anxiety, parkinsonism, memory loss, hypertension, hyperlipidemia, overactive bladder, chronic constipation, and COPD.  The patient presented to [facility] on 5/7/2025 with a primary complaint of fall and weakness.  Patient mentions that she has indoor 2 falls in last 4 days.  Yesterday, she had a fall as she was grabbing a picture of ice tea from the Fridge.  She fell to the ground and drop the entire patient advised to be on herself and the floor. After falling, she was too weak to stand back up to her feet so she laid there until her daughter came home.  She mentions that she lives alone and moves around with a rolling walker.  Daughter does daily wellness checks.  Patient denies any fever, chills, body aches, nausea, vomiting, loss his consciousness, head trauma, loss of bowel or bladder, or cough.     In the ED, vital signs were found to be normal.  CBC shows WBC count of 33 K. CMP is unremarkable.  CPK slightly elevated at 1443.  Troponin and lactic acid are WNL.  CT head shows no acute intracranial pathology.  CT abdomen pelvis shows no posttraumatic abnormality of the abdomen and pelvis.  CT spine shows no acute osseous abnormality, however ground-glass opacities are noted in the right lower lobe.  Blood cultures, urine cultures, sputum cultures pending.       Continue with vancomycin and cefepime, add DuoNebs p.r.n., Breo Ellipta.  SLP, PT/OT, and cm consulted.  Follow up cultures and narrow antibiotics as indicated.    Interval Hx:    Alert but more confused today and admits she is unable to answer some questions. Family later mentioned to RN that she retains CO2 and wears CPAP nightly. VGB ordered and Bipap nightly ordered    Case was discussed with patient's nurse and  on the  floor.    Objective/physical exam:  General: In no acute distress, afebrile  Chest: Clear to auscultation bilaterally  Heart: RRR, +S1, S2, no appreciable murmur  Abdomen: Soft, nontender, BS +  MSK: Warm, no lower extremity edema, no clubbing or cyanosis  Neurologic: Alert and oriented x4, Cranial nerve II-XII intact, Strength 5/5 in all 4 extremities    VITAL SIGNS: 24 HRS MIN & MAX LAST   Temp  Min: 98.5 °F (36.9 °C)  Max: 99.5 °F (37.5 °C) 98.8 °F (37.1 °C)   BP  Min: 99/68  Max: 132/73 99/68   Pulse  Min: 93  Max: 101  101   Resp  Min: 17  Max: 18 18   SpO2  Min: 90 %  Max: 95 % (!) 94 %     I have reviewed the following labs:  Recent Labs   Lab 05/07/25 1959 05/08/25 0514 05/09/25  0656   WBC 33.77  33.77* 23.13* 13.38*   RBC 4.48 4.05* 3.97*   HGB 12.0 10.9* 10.6*   HCT 38.4 35.0* 33.8*   MCV 85.7 86.4 85.1   MCH 26.8* 26.9* 26.7*   MCHC 31.3* 31.1* 31.4*   RDW 15.3 15.3 15.1    294 301   MPV 9.5 9.7 9.6     Recent Labs   Lab 05/07/25 1959 05/08/25 0514 05/09/25 0656 05/10/25  1026   * 132* 133* 134*   K 4.3 4.0 4.5 5.0   CL 93* 95* 94* 90*   CO2 31 30 30 36*   BUN 24.9* 23.1* 15.5 14.0   CREATININE 1.07* 0.81 0.77 0.78   * 99 126* 134*   CALCIUM 9.8 9.5 9.6 10.5*   MG  --  1.60 1.30*  --    ALBUMIN 3.1* 2.8* 2.5*  --    PROT 6.6 6.0 5.8  --    ALKPHOS 96 91 81  --    ALT 19 17 19  --    AST 44 35 23  --    BILITOT 0.4 0.4 0.3  --      Microbiology Results (last 7 days)       Procedure Component Value Units Date/Time    Blood culture #2 **CANNOT BE ORDERED STAT** [[id number]]  (Normal) Collected: 05/07/25 2208    Order Status: Completed Specimen: Blood Updated: 05/10/25 0104     Blood Culture No Growth At 48 Hours    Blood culture #1 **CANNOT BE ORDERED STAT** [[id number]]  (Normal) Collected: 05/07/25 2208    Order Status: Completed Specimen: Blood Updated: 05/10/25 0104     Blood Culture No Growth At 48 Hours    Respiratory Culture [[id number]]     Order Status: Sent Specimen:  Sputum              See below for Radiology    Right lower lobe pneumonia; possibly due to aspiration pneumonia  Fall and weakness  Leukocytosis - improving     Other medical history includes bipolar disorder, anxiety, parkinsonism, memory loss, hypertension, hyperlipidemia, overactive bladder, chronic constipation, COPD     -CT completed head to toe which shows no acute abnormalities except for right lower lung pneumonia - ST recs soft diet with thin liquids, MBS pending  -started on vancomycin-stopped and cefepime; treat for 5-7 days  - Check VBG  - Bipap nightly  -follow up with blood, urine, sputum culture  -daily labs  -SLP, PT/OT, and cm consulted  -bowel regimen        VTE Prophylaxis:  Lovenox     Patient condition:  Stable    Anticipated discharge and Disposition:  Likely will need SNF, awaiting MBS results      All diagnosis and differential diagnosis have been reviewed; assessment and plan has been documented; I have personally reviewed the labs and test results that are presently available; I have reviewed the patients medication list; I have reviewed the consulting providers response and recommendations. I have reviewed or attempted to review medical records based upon their availability    All of the patient's questions have been  addressed and answered. Patient's is agreeable to the above stated plan. I will continue to monitor closely and make adjustments to medical management as needed.    Portions of this note dictated using EMR integrated voice recognition software, and may be subject to voice recognition errors not corrected at proofreading. Please contact writer for clarification if needed.   _____________________________________________________________________    Malnutrition Status:  Nutrition consulted. Most recent weight and BMI monitored-     Measurements:  Wt Readings from Last 1 Encounters:   05/08/25 91.9 kg (202 lb 9.6 oz)   Body mass index is 34.24 kg/m².    Patient has been screened  and assessed by RD.    Malnutrition Type:  Context:    Level:      Malnutrition Characteristic Summary:       Interventions/Recommendations (treatment strategy):        Scheduled Med:   azithromycin  500 mg Intravenous Q24H    ceFEPime IV (PEDS and ADULTS)  1 g Intravenous Q6H    fluticasone furoate-vilanteroL  1 puff Inhalation Daily    mupirocin   Nasal BID    nystatin-triamcinolone   Topical (Top) TID    polyethylene glycol  17 g Oral Daily    senna  8.6 mg Oral Daily      Continuous Infusions:     PRN Meds:    Current Facility-Administered Medications:     acetaminophen, 650 mg, Oral, Q4H PRN    albuterol-ipratropium, 3 mL, Nebulization, Q4H PRN    aluminum-magnesium hydroxide-simethicone, 30 mL, Oral, QID PRN    bisacodyL, 10 mg, Rectal, Daily PRN    dextrose 50%, 12.5 g, Intravenous, PRN    dextrose 50%, 25 g, Intravenous, PRN    glucagon (human recombinant), 1 mg, Intramuscular, PRN    glucose, 16 g, Oral, PRN    glucose, 24 g, Oral, PRN    melatonin, 6 mg, Oral, Nightly PRN    sodium chloride 0.9%, 10 mL, Intravenous, PRN     Radiology:  I have personally reviewed the following imaging and agree with the radiologist.     Fl Modified Barium Swallow Speech  See procedure notes from Speech Pathologist.    This procedure was auto-finalized.  CT Chest Abdomen Pelvis With IV Contrast (XPD) NO Oral Contrast  Narrative: EXAMINATION:  CT CHEST ABDOMEN PELVIS WITH IV CONTRAST (XPD)    CLINICAL HISTORY:  Trauma;    TECHNIQUE:  Axial images of the chest abdomen and pelvis were obtained with IV contrast administration.  Coronal and sagittal reconstructions were provided.  Three dimensional and MIP images were obtained and evaluated.  Total DLP was 2819 mGy-cm. Dose lowering technique and automated exposure control were utilized for this exam.    COMPARISON:  CT of the abdomen and pelvis 09/28/2020.    FINDINGS:  There is no traumatic aortic injury.  There is no active extravasation.  There is no pneumothorax.  There is  no free fluid in the pelvis.    The airway is widely patent.  There is no mediastinal or hilar lymphadenopathy.  There is no central pulmonary embolus.  The heart is not enlarged.  There are extensive poorly defined ground-glass opacities throughout the right lung and to a lesser extent the left lower lobe.  There is no pleural effusion or hemothorax.  There is no pulmonary nodule or mass.    The liver, spleen, pancreas, adrenal glands and kidneys are normal.  There is no solid organ laceration.  The gallbladder is surgically absent.  The stomach and small bowel are decompressed.  The colon is normal.  The uterus and adnexa are normal for age.  The urinary bladder is normal.  There is no pelvic or retroperitoneal lymphadenopathy.  The aorta is nonaneurysmal.  There is no lytic or blastic osseous lesion.  There is no fracture.  Impression: 1. Poorly defined ground-glass opacities throughout the right greater than left lung reflecting multifocal pneumonia versus aspiration pneumonitis.  2. No posttraumatic abnormality of the abdomen and pelvis.  [employer] concordance    Electronically signed by: Felix Castañeda MD  Date:    05/08/2025  Time:    07:43  CT Cervical Spine Without Contrast  Narrative: EXAMINATION:  CT CERVICAL SPINE WITHOUT CONTRAST    CLINICAL HISTORY:  Trauma;    TECHNIQUE:  Low dose axial images, sagittal and coronal reformations were performed though the cervical spine. Contrast was not administered. Dose reduction techniques including automatic exposure control (AEC) were utilized.    Dose (DLP): 787 mGycm    COMPARISON:  CT cervical spine 09/23/2020    FINDINGS:  Straightening of the normal cervical lordosis.  No subluxation.  No acute fracture.  No aggressive or destructive osseous abnormality.  Moderate multilevel cervical disc degeneration.  Mild multilevel facet joint degeneration.  No significant central canal or foraminal stenosis.  No acute paraspinous soft tissue abnormality.  Mixture of  ground-glass opacity and consolidation in the right lung apex.  Impression: No acute osseous abnormality.    Mixture of ground-glass opacity and consolidation in the partially imaged right lung apex.  Please refer to concurrent CT chest, abdomen, and pelvis report for full details.    I agree with the preliminary report    Electronically signed by: Senthil Paul  Date:    05/08/2025  Time:    07:19  CT Head Without Contrast  Narrative: EXAMINATION:  CT HEAD WITHOUT CONTRAST    CLINICAL HISTORY:  Trauma;    TECHNIQUE:  Low dose axial images were obtained through the head.  Coronal and sagittal reformations were also performed. Contrast was not administered.  Dose reduction techniques including automatic exposure control (AEC) were utilized.    Dose (DLP): 1184 mGycm    COMPARISON:  MR brain 12/19/2024.  CT head 12/18/2024.    FINDINGS:  INTRACRANIAL: Brain parenchyma attenuation and configuration is unremarkable.  No acute intracranial hemorrhage.  No hydrocephalus.  No intracranial mass effect.    SINUSES: Visualized paranasal sinuses and mastoids are clear.    SKULL/SCALP: Visualized osseous structures are normal.    ORBITS: Visualized orbits are normal.  Impression: No acute intracranial pathology.    No significant discrepancy between preliminary and final reports.    Electronically signed by: Senthil Paul  Date:    05/08/2025  Time:    07:13      Maria Teresa Osman MD  Department of Hospital Medicine   [facility]   05/10/2025

## 2025-05-11 NOTE — PROGRESS NOTES
[facility] Hospital Medicine Progress Note        Chief Complaint: Inpatient Follow-up for     HPI: Shyanne Cannon is a 74 y.o. female who has a medical history of bipolar disorder, anxiety, parkinsonism, memory loss, hypertension, hyperlipidemia, overactive bladder, chronic constipation, and COPD.  The patient presented to [facility] on 5/7/2025 with a primary complaint of fall and weakness.  Patient mentions that she has indoor 2 falls in last 4 days.  Yesterday, she had a fall as she was grabbing a picture of ice tea from the Fridge.  She fell to the ground and drop the entire patient advised to be on herself and the floor. After falling, she was too weak to stand back up to her feet so she laid there until her daughter came home.  She mentions that she lives alone and moves around with a rolling walker.  Daughter does daily wellness checks.  Patient denies any fever, chills, body aches, nausea, vomiting, loss his consciousness, head trauma, loss of bowel or bladder, or cough.     In the ED, vital signs were found to be normal.  CBC shows WBC count of 33 K. CMP is unremarkable.  CPK slightly elevated at 1443.  Troponin and lactic acid are WNL.  CT head shows no acute intracranial pathology.  CT abdomen pelvis shows no posttraumatic abnormality of the abdomen and pelvis.  CT spine shows no acute osseous abnormality, however ground-glass opacities are noted in the right lower lobe.  Blood cultures, urine cultures, sputum cultures pending.       Continue with vancomycin and cefepime, add DuoNebs p.r.n., Breo Ellipta.  SLP, PT/OT, and cm consulted.  Follow up cultures and narrow antibiotics as indicated.    Interval Hx:    Tremulous and confused today. Was being fed but will make NPO just in case. CTH unremarkable. ABG stable. CXR shows RLL consolidation.    Case was discussed with patient's nurse and  on the floor.    Objective/physical exam:  General: In no acute distress,  afebrile, tremor in RUE  Chest: Poor air movement  Heart: RRR, +S1, S2, no appreciable murmur  Abdomen: Soft, nontender, BS +  MSK: Warm, no lower extremity edema, no clubbing or cyanosis  Neurologic: ALert but confused oriented to self and place, Cranial nerve II-XII intact, Strength 5/5 in all 4 extremities    VITAL SIGNS: 24 HRS MIN & MAX LAST   Temp  Min: 97.9 °F (36.6 °C)  Max: 98.8 °F (37.1 °C) 98.6 °F (37 °C)   BP  Min: 99/68  Max: 127/77 124/77   Pulse  Min: 80  Max: 102  80   Resp  Min: 14  Max: 22 20   SpO2  Min: 91 %  Max: 94 % (!) 93 %     I have reviewed the following labs:  Recent Labs   Lab 05/07/25 1959 05/08/25 0514 05/09/25  0656   WBC 33.77  33.77* 23.13* 13.38*   RBC 4.48 4.05* 3.97*   HGB 12.0 10.9* 10.6*   HCT 38.4 35.0* 33.8*   MCV 85.7 86.4 85.1   MCH 26.8* 26.9* 26.7*   MCHC 31.3* 31.1* 31.4*   RDW 15.3 15.3 15.1    294 301   MPV 9.5 9.7 9.6     Recent Labs   Lab 05/07/25 1959 05/08/25 0514 05/09/25  0656 05/10/25  1026 05/11/25  0447 05/11/25  1430   * 132* 133* 134* 133*  --    K 4.3 4.0 4.5 5.0 4.7  --    CL 93* 95* 94* 90* 91*  --    CO2 31 30 30 36* 31  --    BUN 24.9* 23.1* 15.5 14.0 19.3  --    CREATININE 1.07* 0.81 0.77 0.78 0.68  --    * 99 126* 134* 110  --    CALCIUM 9.8 9.5 9.6 10.5* 10.3*  --    PH  --   --   --   --   --  7.460*   MG  --  1.60 1.30*  --   --   --    ALBUMIN 3.1* 2.8* 2.5*  --   --   --    PROT 6.6 6.0 5.8  --   --   --    ALKPHOS 96 91 81  --   --   --    ALT 19 17 19  --   --   --    AST 44 35 23  --   --   --    BILITOT 0.4 0.4 0.3  --   --   --      Microbiology Results (last 7 days)       Procedure Component Value Units Date/Time    Blood culture #2 **CANNOT BE ORDERED STAT** [[id number]]  (Normal) Collected: 05/07/25 2208    Order Status: Completed Specimen: Blood Updated: 05/11/25 0101     Blood Culture No Growth At 72 Hours    Blood culture #1 **CANNOT BE ORDERED STAT** [[id number]]  (Normal) Collected: 05/07/25 2208    Order  Status: Completed Specimen: Blood Updated: 05/11/25 0101     Blood Culture No Growth At 72 Hours    Respiratory Culture [[id number]]     Order Status: Sent Specimen: Sputum              See below for Radiology    Right lower lobe pneumonia; possibly due to aspiration pneumonia  Fall and weakness  Leukocytosis - improving     Other medical history includes bipolar disorder, anxiety, parkinsonism, memory loss, hypertension, hyperlipidemia, overactive bladder, chronic constipation, COPD     -CT completed head to toe which shows no acute abnormalities except for right lower lung pneumonia - ST recs soft diet with thin liquids  -started on vancomycin-stopped and cefepime; treat for 5-7 days  - Check VBG  - Bipap nightly  -follow up with blood, urine, sputum culture  -daily labs  -SLP, PT/OT, and cm consulted  -bowel regimen  - She is encephalopathic today, CTH stable, ABG stable. will check bladder scan, UDS, tx for COPD exacerbation        VTE Prophylaxis:  Lovenox     Patient condition:  Stable    Anticipated discharge and Disposition:  Likely will need SNF, awaiting MBS results      All diagnosis and differential diagnosis have been reviewed; assessment and plan has been documented; I have personally reviewed the labs and test results that are presently available; I have reviewed the patients medication list; I have reviewed the consulting providers response and recommendations. I have reviewed or attempted to review medical records based upon their availability    All of the patient's questions have been  addressed and answered. Patient's is agreeable to the above stated plan. I will continue to monitor closely and make adjustments to medical management as needed.    Portions of this note dictated using EMR integrated voice recognition software, and may be subject to voice recognition errors not corrected at proofreading. Please contact writer for clarification if needed.    _____________________________________________________________________    Malnutrition Status:  Nutrition consulted. Most recent weight and BMI monitored-     Measurements:  Wt Readings from Last 1 Encounters:   05/08/25 91.9 kg (202 lb 9.6 oz)   Body mass index is 34.24 kg/m².    Patient has been screened and assessed by RD.    Malnutrition Type:  Context:    Level:      Malnutrition Characteristic Summary:       Interventions/Recommendations (treatment strategy):        Scheduled Med:   albuterol-ipratropium  3 mL Nebulization Q4H    azithromycin  500 mg Intravenous Q24H    ceFEPime IV (PEDS and ADULTS)  1 g Intravenous Q6H    fluticasone furoate-vilanteroL  1 puff Inhalation Daily    mupirocin   Nasal BID    nystatin-triamcinolone   Topical (Top) TID    polyethylene glycol  17 g Oral Daily    senna  8.6 mg Oral Daily      Continuous Infusions:     PRN Meds:    Current Facility-Administered Medications:     acetaminophen, 650 mg, Oral, Q4H PRN    albuterol-ipratropium, 3 mL, Nebulization, Q4H PRN    aluminum-magnesium hydroxide-simethicone, 30 mL, Oral, QID PRN    bisacodyL, 10 mg, Rectal, Daily PRN    dextrose 50%, 12.5 g, Intravenous, PRN    dextrose 50%, 25 g, Intravenous, PRN    glucagon (human recombinant), 1 mg, Intramuscular, PRN    glucose, 16 g, Oral, PRN    glucose, 24 g, Oral, PRN    melatonin, 6 mg, Oral, Nightly PRN    sodium chloride 0.9%, 10 mL, Intravenous, PRN     Radiology:  I have personally reviewed the following imaging and agree with the radiologist.     CT Head Without Contrast  Narrative: CT head without contrast    INDICATION:  Mental status change of unknown cause para    TECHNIQUE:  Routine CT of the head was without contrast    Total DLP: 988 mGy.cm    Automatic exposure control was utilized to reduce the patient's dose    COMPARISON:  05/07/2025    FINDINGS:  No acute intra-cranial hemorrhage, midline shift, mass effect or extra-axial collection.    The ventricles are normal in size  and configuration.  There are mild patchy areas of decreased attenuation in periventricular and deep white matter, while nonspecific, most likely sequela of chronic microvascular ischemia.    No sulcal effacement.  Normal grey-white matter differentiation.    Visualized osseous structures are unremarkable.  Visualized paranasal sinuses and mastoid air cells are clear.  Impression: No acute intracranial abnormality or significant interval changes    Electronically signed by: Jerzy Humphrey MD  Date:    05/11/2025  Time:    15:25  X-Ray Chest 1 View  Narrative: EXAMINATION:  XR CHEST 1 VIEW    CLINICAL HISTORY:  Sob;    TECHNIQUE:  One view    COMPARISON:  December 19, 2024..    FINDINGS:  Cardiopericardial silhouette is within normal limits.  There are bilateral lungs infiltrates which are more pronounced and more distributed on the right for which infectious cause is favored.  Please correlate clinically.  No significant fluid within the pleural spaces.  No pneumothorax.  Right axillary metallic clips.  Impression: Lungs infiltrates more distributed on the right.    Electronically signed by: Dank Barcenas  Date:    05/11/2025  Time:    13:21      Maria Teresa Osman MD  Department of Hospital Medicine   [facility]   05/11/2025

## 2025-05-12 PROBLEM — G93.40 ENCEPHALOPATHY: Status: ACTIVE | Noted: 2025-05-12

## 2025-05-12 LAB
ANION GAP SERPL CALC-SCNC: 12 MEQ/L
BUN SERPL-MCNC: 28.5 MG/DL (ref 9.8–20.1)
CALCIUM SERPL-MCNC: 10.6 MG/DL (ref 8.4–10.2)
CHLORIDE SERPL-SCNC: 93 MMOL/L (ref 98–107)
CO2 SERPL-SCNC: 31 MMOL/L (ref 23–31)
CREAT SERPL-MCNC: 0.71 MG/DL (ref 0.55–1.02)
CREAT/UREA NIT SERPL: 40
GFR SERPLBLD CREATININE-BSD FMLA CKD-EPI: >60 ML/MIN/1.73/M2
GLUCOSE SERPL-MCNC: 149 MG/DL (ref 82–115)
POTASSIUM SERPL-SCNC: 4.6 MMOL/L (ref 3.5–5.1)
SODIUM SERPL-SCNC: 136 MMOL/L (ref 136–145)

## 2025-05-12 PROCEDURE — 94760 N-INVAS EAR/PLS OXIMETRY 1: CPT

## 2025-05-12 PROCEDURE — 11000001 HC ACUTE MED/SURG PRIVATE ROOM

## 2025-05-12 PROCEDURE — 94761 N-INVAS EAR/PLS OXIMETRY MLT: CPT

## 2025-05-12 PROCEDURE — 99900031 HC PATIENT EDUCATION (STAT)

## 2025-05-12 PROCEDURE — 63600175 PHARM REV CODE 636 W HCPCS: Performed by: INTERNAL MEDICINE

## 2025-05-12 PROCEDURE — 21400001 HC TELEMETRY ROOM

## 2025-05-12 PROCEDURE — 25000242 PHARM REV CODE 250 ALT 637 W/ HCPCS: Performed by: INTERNAL MEDICINE

## 2025-05-12 PROCEDURE — 27100171 HC OXYGEN HIGH FLOW UP TO 24 HOURS

## 2025-05-12 PROCEDURE — 25000242 PHARM REV CODE 250 ALT 637 W/ HCPCS: Performed by: STUDENT IN AN ORGANIZED HEALTH CARE EDUCATION/TRAINING PROGRAM

## 2025-05-12 PROCEDURE — 36415 COLL VENOUS BLD VENIPUNCTURE: CPT | Performed by: INTERNAL MEDICINE

## 2025-05-12 PROCEDURE — 99900035 HC TECH TIME PER 15 MIN (STAT)

## 2025-05-12 PROCEDURE — 80048 BASIC METABOLIC PNL TOTAL CA: CPT | Performed by: INTERNAL MEDICINE

## 2025-05-12 PROCEDURE — 94660 CPAP INITIATION&MGMT: CPT

## 2025-05-12 PROCEDURE — 94640 AIRWAY INHALATION TREATMENT: CPT

## 2025-05-12 PROCEDURE — 63600175 PHARM REV CODE 636 W HCPCS

## 2025-05-12 PROCEDURE — 25000003 PHARM REV CODE 250: Performed by: INTERNAL MEDICINE

## 2025-05-12 RX ORDER — DULOXETIN HYDROCHLORIDE 30 MG/1
60 CAPSULE, DELAYED RELEASE ORAL DAILY
Status: DISCONTINUED | OUTPATIENT
Start: 2025-05-13 | End: 2025-05-19 | Stop reason: HOSPADM

## 2025-05-12 RX ADMIN — NYSTATIN AND TRIAMCINOLONE ACETONIDE: 100000; 1 CREAM TOPICAL at 04:05

## 2025-05-12 RX ADMIN — IPRATROPIUM BROMIDE AND ALBUTEROL SULFATE 3 ML: .5; 3 SOLUTION RESPIRATORY (INHALATION) at 03:05

## 2025-05-12 RX ADMIN — MUPIROCIN: 20 OINTMENT TOPICAL at 09:05

## 2025-05-12 RX ADMIN — NYSTATIN AND TRIAMCINOLONE ACETONIDE: 100000; 1 CREAM TOPICAL at 09:05

## 2025-05-12 RX ADMIN — PIPERACILLIN SODIUM AND TAZOBACTAM SODIUM 4.5 G: 4; .5 INJECTION, POWDER, LYOPHILIZED, FOR SOLUTION INTRAVENOUS at 11:05

## 2025-05-12 RX ADMIN — IPRATROPIUM BROMIDE AND ALBUTEROL SULFATE 3 ML: .5; 3 SOLUTION RESPIRATORY (INHALATION) at 08:05

## 2025-05-12 RX ADMIN — CEFEPIME 1 G: 1 INJECTION, POWDER, FOR SOLUTION INTRAMUSCULAR; INTRAVENOUS at 12:05

## 2025-05-12 RX ADMIN — CEFEPIME 1 G: 1 INJECTION, POWDER, FOR SOLUTION INTRAMUSCULAR; INTRAVENOUS at 05:05

## 2025-05-12 RX ADMIN — AZITHROMYCIN MONOHYDRATE 500 MG: 500 INJECTION, POWDER, LYOPHILIZED, FOR SOLUTION INTRAVENOUS at 09:05

## 2025-05-12 RX ADMIN — IPRATROPIUM BROMIDE AND ALBUTEROL SULFATE 3 ML: .5; 3 SOLUTION RESPIRATORY (INHALATION) at 11:05

## 2025-05-12 RX ADMIN — PIPERACILLIN SODIUM AND TAZOBACTAM SODIUM 4.5 G: 4; .5 INJECTION, POWDER, LYOPHILIZED, FOR SOLUTION INTRAVENOUS at 07:05

## 2025-05-12 RX ADMIN — IPRATROPIUM BROMIDE AND ALBUTEROL SULFATE 3 ML: .5; 3 SOLUTION RESPIRATORY (INHALATION) at 04:05

## 2025-05-12 RX ADMIN — IPRATROPIUM BROMIDE AND ALBUTEROL SULFATE 3 ML: .5; 3 SOLUTION RESPIRATORY (INHALATION) at 07:05

## 2025-05-12 RX ADMIN — FLUTICASONE FUROATE AND VILANTEROL TRIFENATATE 1 PUFF: 100; 25 POWDER RESPIRATORY (INHALATION) at 09:05

## 2025-05-12 RX ADMIN — IPRATROPIUM BROMIDE AND ALBUTEROL SULFATE 3 ML: .5; 3 SOLUTION RESPIRATORY (INHALATION) at 12:05

## 2025-05-12 NOTE — SUBJECTIVE & OBJECTIVE
No past medical history on file.    No past surgical history on file.    Review of patient's allergies indicates:   Allergen Reactions    Cephalexin Hives    Levofloxacin Hives, Other (See Comments) and Rash     Skin turned red       Current Neurological Medications:     No current facility-administered medications on file prior to encounter.     Current Outpatient Medications on File Prior to Encounter   Medication Sig    ALPRAZolam (XANAX) 0.5 MG tablet Take 0.5 mg by mouth nightly as needed.    atorvastatin (LIPITOR) 40 MG tablet Take 40 mg by mouth every evening.    benztropine (COGENTIN) 1 MG tablet Take 1 mg by mouth once daily.    cholecalciferol, vitamin D3, 1,250 mcg (50,000 unit) capsule Take 50,000 Units by mouth every 7 days.    donepeziL (ARICEPT) 10 MG tablet Take 10 mg by mouth every evening.    doxepin (SINEQUAN) 50 MG capsule Take 50 mg by mouth every evening.    DULoxetine (CYMBALTA) 60 MG capsule Take 60 mg by mouth every morning.    ferrous sulfate 325 (65 FE) MG EC tablet Take 325 mg by mouth once daily.    fluticasone-salmeterol diskus inhaler 100-50 mcg Inhale 1 puff into the lungs 2 (two) times a day.    gabapentin (NEURONTIN) 800 MG tablet Take 800 mg by mouth 3 (three) times daily.    ibuprofen (ADVIL,MOTRIN) 800 MG tablet Take 800 mg by mouth daily as needed.    LACTULOSE ORAL Take 20 g by mouth 2 (two) times daily as needed.    loratadine (CLARITIN) 10 mg tablet Take 10 mg by mouth once daily.    magnesium oxide (MAG-OX) 400 mg (241.3 mg magnesium) tablet Take 400 mg by mouth once daily.    metFORMIN (GLUCOPHAGE) 500 MG tablet Take 500 mg by mouth 2 (two) times daily with meals.    MOUNJARO 5 mg/0.5 mL PnIj Inject 5 mg into the skin once a week. Given every Sunday    nystatin (MYCOSTATIN) ointment Apply 1 Application topically 2 (two) times daily.    OLANZapine (ZYPREXA) 20 MG tablet Take 20 mg by mouth every evening.    omeprazole (PRILOSEC) 40 MG capsule Take 40 mg by mouth every  morning.    tiotropium (SPIRIVA) 18 mcg inhalation capsule Inhale 18 mcg into the lungs Daily. 2 puffs daily    trospium (SANCTURA) 20 mg Tab tablet Take 20 mg by mouth 2 (two) times daily.    WIXELA INHUB 250-50 mcg/dose diskus inhaler Inhale 1 puff into the lungs 2 (two) times daily.     Family History    None       Tobacco Use    Smoking status: Not on file    Smokeless tobacco: Not on file   Substance and Sexual Activity    Alcohol use: Not on file    Drug use: Not on file    Sexual activity: Not on file     Review of Systems   Unable to perform ROS: Acuity of condition       Objective:     Vital Signs (Most Recent):  Temp: 98 °F (36.7 °C) (05/12/25 1215)  Pulse: 100 (05/12/25 1215)  Resp: (!) 24 (05/12/25 1139)  BP: 114/81 (05/12/25 1215)  SpO2: (!) 86 % (05/12/25 1215) Vital Signs (24h Range):  Temp:  [97.9 °F (36.6 °C)-98.3 °F (36.8 °C)] 98 °F (36.7 °C)  Pulse:  [] 100  Resp:  [12-49] 24  SpO2:  [86 %-99 %] 86 %  BP: (114-143)/(66-81) 114/81     Weight: 91.9 kg (202 lb 9.6 oz)  Body mass index is 34.24 kg/m².     Physical Exam  Vitals reviewed.   Constitutional:       General: She is awake.   HENT:      Head: Normocephalic and atraumatic.      Nose: Nose normal.      Mouth/Throat:      Pharynx: Oropharynx is clear.   Eyes:      Pupils: Pupils are equal, round, and reactive to light.   Pulmonary:      Effort: Pulmonary effort is normal.   Skin:     General: Skin is warm and dry.          NEUROLOGICAL EXAMINATION:     MENTAL STATUS        Awake, confused, follows some simple commands  Repeating words when prompted with questions     CRANIAL NERVES     CN II   Visual acuity: (makes eye contact and tracks examiner)    CN III, IV, VI   Pupils are equal, round, and reactive to light.  Nystagmus: none   Conjugate gaze: present    MOTOR EXAM        Moving extremities, no focal weakness noted       Significant Labs:   Recent Lab Results         05/12/25  0718   05/11/25  1430        Allens Test   Yes       Anion  Gap 12.0         BUN 28.5         BUN/CREAT RATIO 40         Calcium 10.6         Calcium Level Ionized   1.25       Chloride 93         CO2 31         Creatinine 0.71         Drawn by   cw rrt       eGFR >60  Comment: Estimated GFR calculated using the CKD-EPI creatinine (2021) equation.         Glucose 149         LPM   4       O2 Hb, Blood Gas   91.9       Oxygen Device, Blood gas   Cannula       Base Excess, Blood gas   13.40       CO Hgb   1.7       POC HCO3   39.8       Met Hgb   1.1       POC PCO2   56.0       POC PH   7.460       POC PO2   65.0       Potassium 4.6         Potassium, Blood Gas   3.9       Sample site   Right Radial Artery       Sample Type   Arterial Blood       sO2, Blood gas   93.5       Sodium 136         Sodium, Blood Gas   125       TOC2, Blood gas   41.5       THb, Blood gas   14.0               Significant Imaging:   CT head w/o 5/11/2025:  FINDINGS:  No acute intra-cranial hemorrhage, midline shift, mass effect or extra-axial collection.     The ventricles are normal in size and configuration.  There are mild patchy areas of decreased attenuation in periventricular and deep white matter, while nonspecific, most likely sequela of chronic microvascular ischemia.     No sulcal effacement.  Normal grey-white matter differentiation.     Visualized osseous structures are unremarkable.  Visualized paranasal sinuses and mastoid air cells are clear.     Impression:     No acute intracranial abnormality or significant interval changes    I have reviewed all pertinent imaging results/findings within the past 24 hours.

## 2025-05-12 NOTE — HPI
73 y/o F with PMH bipolar, memory loss, HTN, HLD, COPD who presented to ED on 5/7 following a fall and complaints of weakness.  Patient has been on the ground by her daughter as patient was unable to get back up on her own.  Patient lives alone, performs IADLs independently, and ambulates with a rolling walker at baseline.  Patient found to have RLL PNA on imaging for which she was started on cefepime and vanc on admit. noted to have confusion, repeating words, and tremulous that began on 05/11, cefepime subsequently discontinued.    Initial CT head w/o on admit and repeat on 5/11 both unrevealing for acute intracranial abnormalities.

## 2025-05-12 NOTE — CONSULTS
"5/12/2025  Shyanne Cannon   1950   [id number]            Psychiatry Evaluation    Date of Admission: 5/7/2025  6:46 PM    Chief Complaint: Psychiatric consult for "Level II"    SUBJECTIVE:   History of Present Illness (Per Internal Medicine):   "Shyanne Cannon is a 74 y.o. female who has a medical history of bipolar disorder, anxiety, parkinsonism, memory loss, hypertension, hyperlipidemia, overactive bladder, chronic constipation, and COPD.  The patient presented to [facility] on 5/7/2025 with a primary complaint of fall and weakness.  Patient mentions that she has indoor 2 falls in last 4 days.  Yesterday, she had a fall as she was grabbing a picture of ice tea from the Fridge.  She fell to the ground and drop the entire patient advised to be on herself and the floor. After falling, she was too weak to stand back up to her feet so she laid there until her daughter came home.  She mentions that she lives alone and moves around with a rolling walker.  Daughter does daily wellness checks.  Patient denies any fever, chills, body aches, nausea, vomiting, loss his consciousness, head trauma, loss of bowel or bladder, or cough.     In the ED, vital signs were found to be normal.  CBC shows WBC count of 33 K. CMP is unremarkable.  CPK slightly elevated at 1443.  Troponin and lactic acid are WNL.  CT head shows no acute intracranial pathology.  CT abdomen pelvis shows no posttraumatic abnormality of the abdomen and pelvis.  CT spine shows no acute osseous abnormality, however ground-glass opacities are noted in the right lower lobe.  Blood cultures, urine cultures, sputum cultures pending."    History of Present Illness (Psychiatry):  Ms. Cannon is a 74-year-old female with a past psychiatric history of bipolar disorder, anxiety, and dementia. Also with past medical history significant for Parkinson's. Psychiatry consulted due to trigger of level 2.       Upon evaluation she is resting quietly in bed and in no " "apparent distress. Answers open ended questions with a mumbled word that sounds like "dasani". Is able to answer yes/no questions but sometimes requires questions to be asked several times for her to answer with a yes or no. Her nurse reports that she was able to display orientation to self earlier today but is unable to answer with the location or time at present. She denies feeling sad, depressed, anxious, or nervous. Affect is reactive, euthymic, and full-range. She does deny impairments in sleep or appetite. She denies suicidal ideations. Denies auditory/visual hallucinations, paranoia, or homicidal ideations. Is not observed to be responding to internal stimuli.         Past Psychiatric History:   Previous Psychiatric Hospitalizations: Denies   Previous Medication Trials: Alprazolam, duloxetine,   Previous Suicide Attempts: Denies   Outpatient psychiatrist: None    Current Medications:   Home Psychiatric Meds: Alprazolam 0.5mg PO QD; Cogentin 1mg PO QD; Donepezil 10mg PO QD, doxepin 50mg PO QHS,  Duloxetine 60mg PO QD, olanzapine 20mg PO QD    Past Medical/Surgical History:   ipolar disorder, anxiety, parkinsonism, memory loss, hypertension, hyperlipidemia, overactive bladder, chronic constipation, and COPD    Family Psychiatric History:   Denies     Allergies:   Review of patient's allergies indicates:   Allergen Reactions    Cephalexin Hives    Levofloxacin Hives, Other (See Comments) and Rash     Skin turned red       Substance Abuse History:   Tobacco: Denies  Alcohol: Denies  Illicit Substances: Denies  Treatment: Denies        Scheduled Meds:    albuterol-ipratropium  3 mL Nebulization Q4H    fluticasone furoate-vilanteroL  1 puff Inhalation Daily    mupirocin   Nasal BID    nystatin-triamcinolone   Topical (Top) TID    piperacillin-tazobactam (Zosyn) IV (PEDS and ADULTS) (extended infusion is not appropriate)  4.5 g Intravenous Q8H    polyethylene glycol  17 g Oral Daily    senna  8.6 mg Oral Daily    "   PRN Meds:   Current Facility-Administered Medications:     acetaminophen, 650 mg, Oral, Q4H PRN    albuterol-ipratropium, 3 mL, Nebulization, Q4H PRN    aluminum-magnesium hydroxide-simethicone, 30 mL, Oral, QID PRN    bisacodyL, 10 mg, Rectal, Daily PRN    dextrose 50%, 12.5 g, Intravenous, PRN    dextrose 50%, 25 g, Intravenous, PRN    glucagon (human recombinant), 1 mg, Intramuscular, PRN    glucose, 16 g, Oral, PRN    glucose, 24 g, Oral, PRN    melatonin, 6 mg, Oral, Nightly PRN    sodium chloride 0.9%, 10 mL, Intravenous, PRN   Psychotherapeutics (From admission, onward)      None              Social History:  Housing Status: Unable to assess  Relationship Status/Sexual Orientation: Single   Children: Unable to assess  Education: Unable to assess  Employment Status/Info: Unable to assess    history: Denies  History of physical/sexual abuse: Denies   Access to gun: Denies      Legal History:   Past Charges/Incarcerations: Denies   Pending charges: Denies      OBJECTIVE:       Vitals   Vitals:    05/12/25 1215   BP: 114/81   Pulse: 100   Resp:    Temp: 98 °F (36.7 °C)        Labs/Imaging/Studies:   Recent Results (from the past 48 hours)   EKG 12-lead    Collection Time: 05/10/25  6:06 PM   Result Value Ref Range    QRS Duration 104 ms    [facility] QTC Calculation 445 ms   Basic Metabolic Panel    Collection Time: 05/11/25  4:47 AM   Result Value Ref Range    Sodium 133 (L) 136 - 145 mmol/L    Potassium 4.7 3.5 - 5.1 mmol/L    Chloride 91 (L) 98 - 107 mmol/L    CO2 31 23 - 31 mmol/L    Glucose 110 82 - 115 mg/dL    Blood Urea Nitrogen 19.3 9.8 - 20.1 mg/dL    Creatinine 0.68 0.55 - 1.02 mg/dL    BUN/Creatinine Ratio 28     Calcium 10.3 (H) 8.4 - 10.2 mg/dL    Anion Gap 11.0 mEq/L    eGFR >60 mL/min/1.73/m2   BNP    Collection Time: 05/11/25 12:16 PM   Result Value Ref Range    Natriuretic Peptide 31.6 <=100.0 pg/mL   Blood Gas    Collection Time: 05/11/25  2:30 PM   Result Value Ref Range    Sample Type  "Arterial Blood     Sample site Right Radial Artery     Drawn by cw rrt     pH, Blood gas 7.460 (H) 7.350 - 7.450    pCO2, Blood gas 56.0 (HH) 35.0 - 45.0 mmHg    pO2, Blood gas 65.0 (L) 80.0 - 100.0 mmHg    Sodium, Blood Gas 125 (L) 137 - 145 mmol/L    Potassium, Blood Gas 3.9 3.5 - 5.0 mmol/L    Calcium Level Ionized 1.25 (H) 1.12 - 1.23 mmol/L    TOC2, Blood gas 41.5 mmol/L    Base Excess, Blood gas 13.40 (H) -2.00 - 2.00 mmol/L    sO2, Blood gas 93.5 %    HCO3, Blood gas 39.8 (H) 22.0 - 26.0 mmol/L    THb, Blood gas 14.0 12 - 16 g/dL    O2 Hb, Blood Gas 91.9 (L) 94.0 - 97.0 %    CO Hgb 1.7 (H) 0.5 - 1.5 %    Met Hgb 1.1 0.4 - 1.5 %    Allens Test Yes     Oxygen Device, Blood gas Cannula     LPM 4    Basic Metabolic Panel    Collection Time: 05/12/25  7:18 AM   Result Value Ref Range    Sodium 136 136 - 145 mmol/L    Potassium 4.6 3.5 - 5.1 mmol/L    Chloride 93 (L) 98 - 107 mmol/L    CO2 31 23 - 31 mmol/L    Glucose 149 (H) 82 - 115 mg/dL    Blood Urea Nitrogen 28.5 (H) 9.8 - 20.1 mg/dL    Creatinine 0.71 0.55 - 1.02 mg/dL    BUN/Creatinine Ratio 40     Calcium 10.6 (H) 8.4 - 10.2 mg/dL    Anion Gap 12.0 mEq/L    eGFR >60 mL/min/1.73/m2      No results found for: "PHENYTOIN", "PHENOBARB", "VALPROATE", "CBMZ"        Psychiatric Mental Status Exam:  General Appearance: appears stated age, dressed in hospital garb, lying in bed, in no acute distress  Arousal: alert  Behavior: pleasant, appropriate eye-contact, under good behavioral control  Movements and Motor Activity: +tremors  Orientation: Unable to Assess  Speech: coherent  Mood: Euthymic  Affect: euthymic  Thought Process: difficult to assess due to poverty of thought  Associations: no loosening of associations  Thought Content and Perceptions: no suicidal or homicidal ideation, no auditory or visual hallucinations, no paranoid ideation, no ideas of reference, no evidence of delusions or psychosis  Recent and Remote Memory: Unable to Assess; per " interview/observation with patient  Attention and Concentration: Unable to Assess; per interview/observation with patient  Fund of Knowledge: Unable to Assess; based on history, vocabulary, fund of knowledge, syntax, grammar, and content  Insight: questionable; based on understanding of severity of illness and HPI  Judgment: questionable; based on patient's behavior and HPI        ASSESSMENT/PLAN:   Diagnoses:  Major Neurocognitive disorder, unspecified type, unspecified severity   Unspecified delirium  Bipolar disorder by history  Anxiety by history      Problem lists and Management Plans:  Medication Management  Duloxetine 60mg PO QD  Continue to hold alprazolam and cogentin given patients cognitive state  Continue to hold doxepin and monitor sleep  Continue to hold olanzapine  Will monitor mood, thought process, and thought content. Given parkinson's disease may benefit from seroquel if an antipsychotic is needed given lower affinity for dopamine antagonism  Recommend delirium precautions  Psychiatry will continue to follow          Marc Isaacs

## 2025-05-12 NOTE — PLAN OF CARE
1131: Spoke to patient's daughter regarding SNF placement. Requesting referrals to be sent to [facility]. [facility], [facility]. [facility], [facility]. [facility] when patient is medically ready.     1328: PASRR completed and submitted in [employer].

## 2025-05-12 NOTE — ASSESSMENT & PLAN NOTE
Likely cefepime induced with hopes pt will improve with time  Await MRI brain results  Psych c/s per primary team  Safety/fall precautions  Avoid sedating medications

## 2025-05-12 NOTE — PROGRESS NOTES
[facility] Hospital Medicine Progress Note        Chief Complaint: Inpatient Follow-up for     HPI: Shyanne Cannon is a 74 y.o. female who has a medical history of bipolar disorder, anxiety, parkinsonism, memory loss, hypertension, hyperlipidemia, overactive bladder, chronic constipation, and COPD.  The patient presented to [facility] on 5/7/2025 with a primary complaint of fall and weakness.  Patient mentions that she has indoor 2 falls in last 4 days.  Yesterday, she had a fall as she was grabbing a picture of ice tea from the Fridge.  She fell to the ground and drop the entire patient advised to be on herself and the floor. After falling, she was too weak to stand back up to her feet so she laid there until her daughter came home.  She mentions that she lives alone and moves around with a rolling walker.  Daughter does daily wellness checks.  Patient denies any fever, chills, body aches, nausea, vomiting, loss his consciousness, head trauma, loss of bowel or bladder, or cough.     In the ED, vital signs were found to be normal.  CBC shows WBC count of 33 K. CMP is unremarkable.  CPK slightly elevated at 1443.  Troponin and lactic acid are WNL.  CT head shows no acute intracranial pathology.  CT abdomen pelvis shows no posttraumatic abnormality of the abdomen and pelvis.  CT spine shows no acute osseous abnormality, however ground-glass opacities are noted in the right lower lobe.  Blood cultures, urine cultures, sputum cultures pending.       Continue with vancomycin and cefepime, add DuoNebs p.r.n., Breo Ellipta.  SLP, PT/OT, and cm consulted.  Follow up cultures and narrow antibiotics as indicated.    Interval Hx:    On Bipap while sleeping but arousable, repeats words and is tremulous. Stopping cefepime due to concern for neurotoxicity. Will consult Neurology, continue NPO for today    Case was discussed with patient's nurse and  on the floor.    Objective/physical  exam:  General: In no acute distress, afebrile, tremor in RUE  Chest: Poor air movement  Heart: RRR, +S1, S2, no appreciable murmur  Abdomen: Soft, nontender, BS +  MSK: Warm, no lower extremity edema, no clubbing or cyanosis  Neurologic: ALert but confused oriented to self and place, Cranial nerve II-XII intact, Strength 5/5 in all 4 extremities    VITAL SIGNS: 24 HRS MIN & MAX LAST   Temp  Min: 97.9 °F (36.6 °C)  Max: 98.3 °F (36.8 °C) 98 °F (36.7 °C)   BP  Min: 114/81  Max: 143/71 114/81   Pulse  Min: 78  Max: 100  100   Resp  Min: 12  Max: 49 (!) 24   SpO2  Min: 86 %  Max: 99 % (!) 86 %     I have reviewed the following labs:  Recent Labs   Lab 05/07/25 1959 05/08/25  0514 05/09/25  0656   WBC 33.77  33.77* 23.13* 13.38*   RBC 4.48 4.05* 3.97*   HGB 12.0 10.9* 10.6*   HCT 38.4 35.0* 33.8*   MCV 85.7 86.4 85.1   MCH 26.8* 26.9* 26.7*   MCHC 31.3* 31.1* 31.4*   RDW 15.3 15.3 15.1    294 301   MPV 9.5 9.7 9.6     Recent Labs   Lab 05/07/25 1959 05/08/25 0514 05/09/25  0656 05/10/25  1026 05/11/25  0447 05/11/25  1430 05/12/25  0718   * 132* 133* 134* 133*  --  136   K 4.3 4.0 4.5 5.0 4.7  --  4.6   CL 93* 95* 94* 90* 91*  --  93*   CO2 31 30 30 36* 31  --  31   BUN 24.9* 23.1* 15.5 14.0 19.3  --  28.5*   CREATININE 1.07* 0.81 0.77 0.78 0.68  --  0.71   * 99 126* 134* 110  --  149*   CALCIUM 9.8 9.5 9.6 10.5* 10.3*  --  10.6*   PH  --   --   --   --   --  7.460*  --    MG  --  1.60 1.30*  --   --   --   --    ALBUMIN 3.1* 2.8* 2.5*  --   --   --   --    PROT 6.6 6.0 5.8  --   --   --   --    ALKPHOS 96 91 81  --   --   --   --    ALT 19 17 19  --   --   --   --    AST 44 35 23  --   --   --   --    BILITOT 0.4 0.4 0.3  --   --   --   --      Microbiology Results (last 7 days)       Procedure Component Value Units Date/Time    Blood culture #2 **CANNOT BE ORDERED STAT** [[id number]]  (Normal) Collected: 05/07/25 220    Order Status: Completed Specimen: Blood Updated: 05/12/25 0103     Blood  Culture No Growth At 96 Hours    Blood culture #1 **CANNOT BE ORDERED STAT** [[id number]]  (Normal) Collected: 05/07/25 2208    Order Status: Completed Specimen: Blood Updated: 05/12/25 0103     Blood Culture No Growth At 96 Hours    Respiratory Culture [[id number]]     Order Status: Sent Specimen: Sputum              See below for Radiology    Assessment   Worsening encephalopathy  Right lower lobe pneumonia; possibly due to aspiration pneumonia  Fall and weakness  Leukocytosis - improving     Other medical history includes bipolar disorder, anxiety, parkinsonism, memory loss, hypertension, hyperlipidemia, overactive bladder, chronic constipation, COPD     -CT completed head to toe which shows no acute abnormalities except for right lower lung pneumonia - ST recs soft diet with thin liquids  -started on vancomycin-stopped and cefepime; treat for 5-7 days  - Check VBG  - Bipap nightly  -follow up with blood, urine, sputum culture  -daily labs  -SLP, PT/OT, and cm consulted  -bowel regimen  - She is encephalopathic today, CTH stable, ABG stable. will check bladder scan, UDS, tx for COPD exacerbation  - MRI brain  - Neurology consult, appreciate recommendations  - Will stop cefepime due to concerns for neurotoxicity, called and left message for pt's daughter        VTE Prophylaxis:  Lovenox     Patient condition:  Stable    Anticipated discharge and Disposition:  Likely will need SNF, awaiting MBS results      All diagnosis and differential diagnosis have been reviewed; assessment and plan has been documented; I have personally reviewed the labs and test results that are presently available; I have reviewed the patients medication list; I have reviewed the consulting providers response and recommendations. I have reviewed or attempted to review medical records based upon their availability    All of the patient's questions have been  addressed and answered. Patient's is agreeable to the above stated plan. I will  continue to monitor closely and make adjustments to medical management as needed.    Portions of this note dictated using EMR integrated voice recognition software, and may be subject to voice recognition errors not corrected at proofreading. Please contact writer for clarification if needed.   _____________________________________________________________________    Malnutrition Status:  Nutrition consulted. Most recent weight and BMI monitored-     Measurements:  Wt Readings from Last 1 Encounters:   05/08/25 91.9 kg (202 lb 9.6 oz)   Body mass index is 34.24 kg/m².    Patient has been screened and assessed by RD.    Malnutrition Type:  Context:    Level:      Malnutrition Characteristic Summary:       Interventions/Recommendations (treatment strategy):        Scheduled Med:   albuterol-ipratropium  3 mL Nebulization Q4H    fluticasone furoate-vilanteroL  1 puff Inhalation Daily    mupirocin   Nasal BID    nystatin-triamcinolone   Topical (Top) TID    piperacillin-tazobactam (Zosyn) IV (PEDS and ADULTS) (extended infusion is not appropriate)  4.5 g Intravenous Q8H    polyethylene glycol  17 g Oral Daily    senna  8.6 mg Oral Daily      Continuous Infusions:     PRN Meds:    Current Facility-Administered Medications:     acetaminophen, 650 mg, Oral, Q4H PRN    albuterol-ipratropium, 3 mL, Nebulization, Q4H PRN    aluminum-magnesium hydroxide-simethicone, 30 mL, Oral, QID PRN    bisacodyL, 10 mg, Rectal, Daily PRN    dextrose 50%, 12.5 g, Intravenous, PRN    dextrose 50%, 25 g, Intravenous, PRN    glucagon (human recombinant), 1 mg, Intramuscular, PRN    glucose, 16 g, Oral, PRN    glucose, 24 g, Oral, PRN    melatonin, 6 mg, Oral, Nightly PRN    sodium chloride 0.9%, 10 mL, Intravenous, PRN     Radiology:  I have personally reviewed the following imaging and agree with the radiologist.     CT Head Without Contrast  Narrative: CT head without contrast    INDICATION:  Mental status change of unknown cause  para    TECHNIQUE:  Routine CT of the head was without contrast    Total DLP: 988 mGy.cm    Automatic exposure control was utilized to reduce the patient's dose    COMPARISON:  05/07/2025    FINDINGS:  No acute intra-cranial hemorrhage, midline shift, mass effect or extra-axial collection.    The ventricles are normal in size and configuration.  There are mild patchy areas of decreased attenuation in periventricular and deep white matter, while nonspecific, most likely sequela of chronic microvascular ischemia.    No sulcal effacement.  Normal grey-white matter differentiation.    Visualized osseous structures are unremarkable.  Visualized paranasal sinuses and mastoid air cells are clear.  Impression: No acute intracranial abnormality or significant interval changes    Electronically signed by: Jerzy Humphrey MD  Date:    05/11/2025  Time:    15:25  X-Ray Chest 1 View  Narrative: EXAMINATION:  XR CHEST 1 VIEW    CLINICAL HISTORY:  Sob;    TECHNIQUE:  One view    COMPARISON:  December 19, 2024..    FINDINGS:  Cardiopericardial silhouette is within normal limits.  There are bilateral lungs infiltrates which are more pronounced and more distributed on the right for which infectious cause is favored.  Please correlate clinically.  No significant fluid within the pleural spaces.  No pneumothorax.  Right axillary metallic clips.  Impression: Lungs infiltrates more distributed on the right.    Electronically signed by: Dank Barcenas  Date:    05/11/2025  Time:    13:21      Maria Teresa Osman MD  Department of Hospital Medicine   [facility]   05/12/2025

## 2025-05-12 NOTE — CONSULTS
[facility]  Neurology  Consult Note    Patient Name: Shyanne Cannon  MRN: [id number]  Admission Date: 5/7/2025  Hospital Length of Stay: 5 days  Code Status: Full Code   Attending Provider: Reyes, Thairy G, DO   Consulting Provider: MONICA Dudley  Primary Care Physician: Mayito Roberts MD  Principal Problem:<principal problem not specified>    Inpatient Consult to Neurology Services (General Neurology)  Consult performed by: Haleigh Jensen AGACNP-BC  Consult ordered by: Maria Teresa Osman MD         Subjective:     Chief Complaint:       HPI:   73 y/o F with PMH bipolar, memory loss, HTN, HLD, COPD who presented to ED on 5/7 following a fall and complaints of weakness.  Patient has been on the ground by her daughter as patient was unable to get back up on her own.  Patient lives alone, performs IADLs independently, and ambulates with a rolling walker at baseline.  Patient found to have RLL PNA on imaging for which she was started on cefepime and vanc on admit. noted to have confusion, repeating words, and tremulous that began on 05/11, cefepime subsequently discontinued.    Initial CT head w/o on admit and repeat on 5/11 both unrevealing for acute intracranial abnormalities.     No past medical history on file.    No past surgical history on file.    Review of patient's allergies indicates:   Allergen Reactions    Cephalexin Hives    Levofloxacin Hives, Other (See Comments) and Rash     Skin turned red       Current Neurological Medications:     No current facility-administered medications on file prior to encounter.     Current Outpatient Medications on File Prior to Encounter   Medication Sig    ALPRAZolam (XANAX) 0.5 MG tablet Take 0.5 mg by mouth nightly as needed.    atorvastatin (LIPITOR) 40 MG tablet Take 40 mg by mouth every evening.    benztropine (COGENTIN) 1 MG tablet Take 1 mg by mouth once daily.    cholecalciferol, vitamin D3, 1,250 mcg  (50,000 unit) capsule Take 50,000 Units by mouth every 7 days.    donepeziL (ARICEPT) 10 MG tablet Take 10 mg by mouth every evening.    doxepin (SINEQUAN) 50 MG capsule Take 50 mg by mouth every evening.    DULoxetine (CYMBALTA) 60 MG capsule Take 60 mg by mouth every morning.    ferrous sulfate 325 (65 FE) MG EC tablet Take 325 mg by mouth once daily.    fluticasone-salmeterol diskus inhaler 100-50 mcg Inhale 1 puff into the lungs 2 (two) times a day.    gabapentin (NEURONTIN) 800 MG tablet Take 800 mg by mouth 3 (three) times daily.    ibuprofen (ADVIL,MOTRIN) 800 MG tablet Take 800 mg by mouth daily as needed.    LACTULOSE ORAL Take 20 g by mouth 2 (two) times daily as needed.    loratadine (CLARITIN) 10 mg tablet Take 10 mg by mouth once daily.    magnesium oxide (MAG-OX) 400 mg (241.3 mg magnesium) tablet Take 400 mg by mouth once daily.    metFORMIN (GLUCOPHAGE) 500 MG tablet Take 500 mg by mouth 2 (two) times daily with meals.    MOUNJARO 5 mg/0.5 mL PnIj Inject 5 mg into the skin once a week. Given every Sunday    nystatin (MYCOSTATIN) ointment Apply 1 Application topically 2 (two) times daily.    OLANZapine (ZYPREXA) 20 MG tablet Take 20 mg by mouth every evening.    omeprazole (PRILOSEC) 40 MG capsule Take 40 mg by mouth every morning.    tiotropium (SPIRIVA) 18 mcg inhalation capsule Inhale 18 mcg into the lungs Daily. 2 puffs daily    trospium (SANCTURA) 20 mg Tab tablet Take 20 mg by mouth 2 (two) times daily.    WIXELA INHUB 250-50 mcg/dose diskus inhaler Inhale 1 puff into the lungs 2 (two) times daily.     Family History    None       Tobacco Use    Smoking status: Not on file    Smokeless tobacco: Not on file   Substance and Sexual Activity    Alcohol use: Not on file    Drug use: Not on file    Sexual activity: Not on file     Review of Systems   Unable to perform ROS: Acuity of condition       Objective:     Vital Signs (Most Recent):  Temp: 98 °F (36.7 °C) (05/12/25  1215)  Pulse: 100 (05/12/25 1215)  Resp: (!) 24 (05/12/25 1139)  BP: 114/81 (05/12/25 1215)  SpO2: (!) 86 % (05/12/25 1215) Vital Signs (24h Range):  Temp:  [97.9 °F (36.6 °C)-98.3 °F (36.8 °C)] 98 °F (36.7 °C)  Pulse:  [] 100  Resp:  [12-49] 24  SpO2:  [86 %-99 %] 86 %  BP: (114-143)/(66-81) 114/81     Weight: 91.9 kg (202 lb 9.6 oz)  Body mass index is 34.24 kg/m².     Physical Exam  Vitals reviewed.   Constitutional:       General: She is awake.   HENT:      Head: Normocephalic and atraumatic.      Nose: Nose normal.      Mouth/Throat:      Pharynx: Oropharynx is clear.   Eyes:      Pupils: Pupils are equal, round, and reactive to light.   Pulmonary:      Effort: Pulmonary effort is normal.   Skin:     General: Skin is warm and dry.          NEUROLOGICAL EXAMINATION:     MENTAL STATUS        Awake, confused, follows some simple commands  Repeating words when prompted with questions     CRANIAL NERVES     CN II   Visual acuity: (makes eye contact and tracks examiner)    CN III, IV, VI   Pupils are equal, round, and reactive to light.  Nystagmus: none   Conjugate gaze: present    MOTOR EXAM        Moving extremities, no focal weakness noted       Significant Labs:   Recent Lab Results         05/12/25  0718   05/11/25  1430        Allens Test   Yes       Anion Gap 12.0         BUN 28.5         BUN/CREAT RATIO 40         Calcium 10.6         Calcium Level Ionized   1.25       Chloride 93         CO2 31         Creatinine 0.71         Drawn by   cw rrt       eGFR >60  Comment: Estimated GFR calculated using the CKD-EPI creatinine (2021) equation.         Glucose 149         LPM   4       O2 Hb, Blood Gas   91.9       Oxygen Device, Blood gas   Cannula       Base Excess, Blood gas   13.40       CO Hgb   1.7       POC HCO3   39.8       Met Hgb   1.1       POC PCO2   56.0       POC PH   7.460       POC PO2   65.0       Potassium 4.6         Potassium, Blood Gas   3.9       Sample site   Right Radial Artery        Sample Type   Arterial Blood       sO2, Blood gas   93.5       Sodium 136         Sodium, Blood Gas   125       TOC2, Blood gas   41.5       THb, Blood gas   14.0               Significant Imaging:   CT head w/o 5/11/2025:  FINDINGS:  No acute intra-cranial hemorrhage, midline shift, mass effect or extra-axial collection.     The ventricles are normal in size and configuration.  There are mild patchy areas of decreased attenuation in periventricular and deep white matter, while nonspecific, most likely sequela of chronic microvascular ischemia.     No sulcal effacement.  Normal grey-white matter differentiation.     Visualized osseous structures are unremarkable.  Visualized paranasal sinuses and mastoid air cells are clear.     Impression:     No acute intracranial abnormality or significant interval changes    I have reviewed all pertinent imaging results/findings within the past 24 hours.  Assessment and Plan:     Encephalopathy  Likely cefepime induced with hopes pt will improve with time  Await MRI brain results  Psych c/s per primary team  Safety/fall precautions  Avoid sedating medications        VTE Risk Mitigation (From admission, onward)           Ordered     Reason for No Pharmacological VTE Prophylaxis  Once        Comments: Will assess when patient is examined   Question:  Reasons:  Answer:  Physician Provided (leave comment)    05/07/25 2331     IP VTE HIGH RISK PATIENT  Once         05/07/25 2331     Place sequential compression device  Until discontinued         05/07/25 2331                    Thank you for your consult. Further recommendations to follow per MD Haleigh Jensen, [facility]-BC  Neurology  [facility]

## 2025-05-13 LAB
ANION GAP SERPL CALC-SCNC: 11 MEQ/L
BACTERIA BLD CULT: NORMAL
BACTERIA BLD CULT: NORMAL
BUN SERPL-MCNC: 31.4 MG/DL (ref 9.8–20.1)
CALCIUM SERPL-MCNC: 10.3 MG/DL (ref 8.4–10.2)
CHLORIDE SERPL-SCNC: 92 MMOL/L (ref 98–107)
CO2 SERPL-SCNC: 30 MMOL/L (ref 23–31)
CREAT SERPL-MCNC: 0.85 MG/DL (ref 0.55–1.02)
CREAT/UREA NIT SERPL: 37
GFR SERPLBLD CREATININE-BSD FMLA CKD-EPI: >60 ML/MIN/1.73/M2
GLUCOSE SERPL-MCNC: 116 MG/DL (ref 82–115)
POTASSIUM SERPL-SCNC: 4.4 MMOL/L (ref 3.5–5.1)
SODIUM SERPL-SCNC: 133 MMOL/L (ref 136–145)

## 2025-05-13 PROCEDURE — 21400001 HC TELEMETRY ROOM

## 2025-05-13 PROCEDURE — 97116 GAIT TRAINING THERAPY: CPT | Mod: CQ

## 2025-05-13 PROCEDURE — 99900035 HC TECH TIME PER 15 MIN (STAT)

## 2025-05-13 PROCEDURE — 25000242 PHARM REV CODE 250 ALT 637 W/ HCPCS: Performed by: STUDENT IN AN ORGANIZED HEALTH CARE EDUCATION/TRAINING PROGRAM

## 2025-05-13 PROCEDURE — 63600175 PHARM REV CODE 636 W HCPCS: Performed by: INTERNAL MEDICINE

## 2025-05-13 PROCEDURE — 11000001 HC ACUTE MED/SURG PRIVATE ROOM

## 2025-05-13 PROCEDURE — 92610 EVALUATE SWALLOWING FUNCTION: CPT

## 2025-05-13 PROCEDURE — 94761 N-INVAS EAR/PLS OXIMETRY MLT: CPT

## 2025-05-13 PROCEDURE — 25000003 PHARM REV CODE 250: Performed by: INTERNAL MEDICINE

## 2025-05-13 PROCEDURE — 27100171 HC OXYGEN HIGH FLOW UP TO 24 HOURS

## 2025-05-13 PROCEDURE — 94760 N-INVAS EAR/PLS OXIMETRY 1: CPT

## 2025-05-13 PROCEDURE — 97535 SELF CARE MNGMENT TRAINING: CPT | Mod: CO

## 2025-05-13 PROCEDURE — 25000003 PHARM REV CODE 250

## 2025-05-13 PROCEDURE — 94640 AIRWAY INHALATION TREATMENT: CPT

## 2025-05-13 PROCEDURE — 80048 BASIC METABOLIC PNL TOTAL CA: CPT | Performed by: INTERNAL MEDICINE

## 2025-05-13 PROCEDURE — 99900031 HC PATIENT EDUCATION (STAT)

## 2025-05-13 PROCEDURE — 25000242 PHARM REV CODE 250 ALT 637 W/ HCPCS: Performed by: INTERNAL MEDICINE

## 2025-05-13 PROCEDURE — 36415 COLL VENOUS BLD VENIPUNCTURE: CPT | Performed by: INTERNAL MEDICINE

## 2025-05-13 PROCEDURE — 94660 CPAP INITIATION&MGMT: CPT

## 2025-05-13 RX ADMIN — IPRATROPIUM BROMIDE AND ALBUTEROL SULFATE 3 ML: .5; 3 SOLUTION RESPIRATORY (INHALATION) at 12:05

## 2025-05-13 RX ADMIN — DULOXETINE 60 MG: 30 CAPSULE, DELAYED RELEASE ORAL at 08:05

## 2025-05-13 RX ADMIN — FLUTICASONE FUROATE AND VILANTEROL TRIFENATATE 1 PUFF: 100; 25 POWDER RESPIRATORY (INHALATION) at 08:05

## 2025-05-13 RX ADMIN — IPRATROPIUM BROMIDE AND ALBUTEROL SULFATE 3 ML: .5; 3 SOLUTION RESPIRATORY (INHALATION) at 08:05

## 2025-05-13 RX ADMIN — PIPERACILLIN SODIUM AND TAZOBACTAM SODIUM 4.5 G: 4; .5 INJECTION, POWDER, LYOPHILIZED, FOR SOLUTION INTRAVENOUS at 07:05

## 2025-05-13 RX ADMIN — PIPERACILLIN SODIUM AND TAZOBACTAM SODIUM 4.5 G: 4; .5 INJECTION, POWDER, LYOPHILIZED, FOR SOLUTION INTRAVENOUS at 10:05

## 2025-05-13 RX ADMIN — IPRATROPIUM BROMIDE AND ALBUTEROL SULFATE 3 ML: .5; 3 SOLUTION RESPIRATORY (INHALATION) at 04:05

## 2025-05-13 RX ADMIN — IPRATROPIUM BROMIDE AND ALBUTEROL SULFATE 3 ML: .5; 3 SOLUTION RESPIRATORY (INHALATION) at 07:05

## 2025-05-13 RX ADMIN — NYSTATIN AND TRIAMCINOLONE ACETONIDE: 100000; 1 CREAM TOPICAL at 08:05

## 2025-05-13 RX ADMIN — PIPERACILLIN SODIUM AND TAZOBACTAM SODIUM 4.5 G: 4; .5 INJECTION, POWDER, LYOPHILIZED, FOR SOLUTION INTRAVENOUS at 02:05

## 2025-05-13 RX ADMIN — NYSTATIN AND TRIAMCINOLONE ACETONIDE: 100000; 1 CREAM TOPICAL at 05:05

## 2025-05-13 RX ADMIN — TRAZODONE HYDROCHLORIDE 25 MG: 50 TABLET ORAL at 08:05

## 2025-05-13 NOTE — PROGRESS NOTES
"5/13/2025  Shyanne Cannon   1950   [id number]        Psychiatry Progress Note       SUBJECTIVE:   Ms. Cannon is a 74-year-old female with a past psychiatric history of bipolar disorder, anxiety, and dementia. Also with past medical history significant for Parkinson's. Psychiatry consulted due to trigger of level 2.   Upon evaluation she is resting quietly in bed and in no apparent distress. Answers open ended questions with a mumbled word that sounds like "dasani". Is able to answer yes/no questions but sometimes requires questions to be asked several times for her to answer with a yes or no. Her nurse reports that she was able to display orientation to self earlier today but is unable to answer with the location or time at present. She denies feeling sad, depressed, anxious, or nervous. Affect is reactive, euthymic, and full-range. She does deny impairments in sleep or appetite. She denies suicidal ideations. Denies auditory/visual hallucinations, paranoia, or homicidal ideations. Is not observed to be responding to internal stimuli.     Displays improved thought process today that is linear and no longer repeating words. Oriented to person and place. Displays inattention to interview at times. When asked about her mood she states "I don't know". Also states that she doesn't know when asked if she feels depressed or anxious or irritable. Affect is constricted and does not appear depressed or anxious. She is unable to report any past episodes resembling nova but has impaired memory. Does report difficulty with sleep overnight. Denies auditory/visual hallucinations or paranoia. Not observed to be responding to internal stimuli and no evidence of psychosis at this time.        Current Medications:   Scheduled Meds:    albuterol-ipratropium  3 mL Nebulization Q4H    DULoxetine  60 mg Oral Daily    fluticasone furoate-vilanteroL  1 puff Inhalation Daily    nystatin-triamcinolone   Topical (Top) TID    " piperacillin-tazobactam (Zosyn) IV (PEDS and ADULTS) (extended infusion is not appropriate)  4.5 g Intravenous Q8H    polyethylene glycol  17 g Oral Daily    senna  8.6 mg Oral Daily      PRN Meds:   Current Facility-Administered Medications:     acetaminophen, 650 mg, Oral, Q4H PRN    albuterol-ipratropium, 3 mL, Nebulization, Q4H PRN    aluminum-magnesium hydroxide-simethicone, 30 mL, Oral, QID PRN    bisacodyL, 10 mg, Rectal, Daily PRN    dextrose 50%, 12.5 g, Intravenous, PRN    dextrose 50%, 25 g, Intravenous, PRN    glucagon (human recombinant), 1 mg, Intramuscular, PRN    glucose, 16 g, Oral, PRN    glucose, 24 g, Oral, PRN    melatonin, 6 mg, Oral, Nightly PRN    sodium chloride 0.9%, 10 mL, Intravenous, PRN   Psychotherapeutics (From admission, onward)      Start     Stop Route Frequency Ordered    05/13/25 0900  DULoxetine DR capsule 60 mg         -- Oral Daily 05/12/25 1812            Allergies:   Review of patient's allergies indicates:   Allergen Reactions    Cephalexin Hives    Levofloxacin Hives, Other (See Comments) and Rash     Skin turned red        OBJECTIVE:   Vitals   Vitals:    05/13/25 0859   BP:    Pulse: 77   Resp: 18   Temp:         Labs/Imaging/Studies:   Recent Results (from the past 36 hours)   Basic Metabolic Panel    Collection Time: 05/12/25  7:18 AM   Result Value Ref Range    Sodium 136 136 - 145 mmol/L    Potassium 4.6 3.5 - 5.1 mmol/L    Chloride 93 (L) 98 - 107 mmol/L    CO2 31 23 - 31 mmol/L    Glucose 149 (H) 82 - 115 mg/dL    Blood Urea Nitrogen 28.5 (H) 9.8 - 20.1 mg/dL    Creatinine 0.71 0.55 - 1.02 mg/dL    BUN/Creatinine Ratio 40     Calcium 10.6 (H) 8.4 - 10.2 mg/dL    Anion Gap 12.0 mEq/L    eGFR >60 mL/min/1.73/m2   Basic Metabolic Panel    Collection Time: 05/13/25  5:38 AM   Result Value Ref Range    Sodium 133 (L) 136 - 145 mmol/L    Potassium 4.4 3.5 - 5.1 mmol/L    Chloride 92 (L) 98 - 107 mmol/L    CO2 30 23 - 31 mmol/L    Glucose 116 (H) 82 - 115 mg/dL    Blood  Urea Nitrogen 31.4 (H) 9.8 - 20.1 mg/dL    Creatinine 0.85 0.55 - 1.02 mg/dL    BUN/Creatinine Ratio 37     Calcium 10.3 (H) 8.4 - 10.2 mg/dL    Anion Gap 11.0 mEq/L    eGFR >60 mL/min/1.73/m2              Psychiatric Mental Status Exam:  General Appearance: appears stated age, dressed in hospital garb, lying in bed, in no acute distress  Arousal: alert  Behavior: cooperative, polite, appropriate eye-contact, under good behavioral control  Movements and Motor Activity: +tremors  Orientation: oriented to person and place  Speech: coherent, slowed  Mood: Guarded  Affect: constricted  Thought Process: linear, goal-directed  Associations: no loosening of associations  Thought Content and Perceptions: no suicidal or homicidal ideation, no auditory or visual hallucinations, no paranoid ideation, no ideas of reference, no evidence of delusions or psychosis  Recent and Remote Memory: impaired; per interview/observation with patient  Attention and Concentration: easily distractible; per interview/observation with patient  Fund of Knowledge: vocabulary appropriate; based on history, vocabulary, fund of knowledge, syntax, grammar, and content  Insight: questionable; based on understanding of severity of illness and HPI  Judgment: questionable; based on patient's behavior and HPI    ASSESSMENT/PLAN:   Problems Addressed/Diagnoses:  Major Neurocognitive disorder, unspecified type, unspecified severity   Unspecified delirium  Bipolar disorder by history  Anxiety by history       Plan:  Medication Management  Duloxetine 60mg PO QD  Trazodone 25mg PO QHS  Recommend delirium precautions  Legal  PEC not recommended. Low risk of imminent harm to self or others.   Psychiatry will continue to follow        Marc Isaacs

## 2025-05-13 NOTE — PROGRESS NOTES
Inpatient Nutrition Assessment    Admit Date: 5/7/2025   Total duration of encounter: 6 days   Patient Age: 74 y.o.    Nutrition Recommendation/Prescription     Resume soft and bite sized diet once medically appropriate   Chocolate Boost Plus BID (360 kcal and 14 gm protein per serving)  Monitor PO intake, labs and weight    Communication of Recommendations: reviewed with patient    Nutrition Assessment     Malnutrition Assessment/Nutrition-Focused Physical Exam       Malnutrition Level: other (see comments) (Does not meet criteria) (05/13/25 1330)  Energy Intake (Malnutrition): less than or equal to 50% for greater than or equal to 5 days (05/13/25 1330)  Weight Loss (Malnutrition): other (see comments) (Does not meet criteria) (05/13/25 1330)                                         Fluid Accumulation (Malnutrition): other (see comments) (Not present) (05/13/25 1330)        A minimum of two characteristics is recommended for diagnosis of either severe or non-severe malnutrition.    Chart Review    Reason Seen: length of stay    Malnutrition Screening Tool Results   Have you recently lost weight without trying?: No  Have you been eating poorly because of a decreased appetite?: No   MST Score: 0   Diagnosis:  Worsening encephalopathy  Right lower lobe pneumonia; possibly due to aspiration pneumonia  Fall and weakness  Leukocytosis - improving    Relevant Medical History: bipolar disorder, anxiety, dementia, Parkinson's, HTN, HLD, overactive bladder, chronic constipation, COPD     Scheduled Medications:  albuterol-ipratropium, 3 mL, Q4H  DULoxetine, 60 mg, Daily  fluticasone furoate-vilanteroL, 1 puff, Daily  nystatin-triamcinolone, , TID  piperacillin-tazobactam (Zosyn) IV (PEDS and ADULTS) (extended infusion is not appropriate), 4.5 g, Q8H  polyethylene glycol, 17 g, Daily  senna, 8.6 mg, Daily  traZODone, 25 mg, QHS    Continuous Infusions:   PRN Medications:  acetaminophen, 650 mg, Q4H PRN  albuterol-ipratropium,  3 mL, Q4H PRN  aluminum-magnesium hydroxide-simethicone, 30 mL, QID PRN  bisacodyL, 10 mg, Daily PRN  dextrose 50%, 12.5 g, PRN  dextrose 50%, 25 g, PRN  glucagon (human recombinant), 1 mg, PRN  glucose, 16 g, PRN  glucose, 24 g, PRN  melatonin, 6 mg, Nightly PRN  sodium chloride 0.9%, 10 mL, PRN    Calorie Containing IV Medications: no significant kcals from medications at this time    Recent Labs   Lab 05/07/25  1959 05/08/25  0514 05/09/25  0656 05/10/25  1026 05/11/25  0447 05/12/25  0718 05/13/25  0538   * 132* 133* 134* 133* 136 133*   K 4.3 4.0 4.5 5.0 4.7 4.6 4.4   CALCIUM 9.8 9.5 9.6 10.5* 10.3* 10.6* 10.3*   PHOS  --   --  5.4*  --   --   --   --    MG  --  1.60 1.30*  --   --   --   --    CL 93* 95* 94* 90* 91* 93* 92*   CO2 31 30 30 36* 31 31 30   BUN 24.9* 23.1* 15.5 14.0 19.3 28.5* 31.4*   CREATININE 1.07* 0.81 0.77 0.78 0.68 0.71 0.85   EGFRNORACEVR 55 >60 >60 >60 >60 >60 >60   * 99 126* 134* 110 149* 116*   BILITOT 0.4 0.4 0.3  --   --   --   --    ALKPHOS 96 91 81  --   --   --   --    ALT 19 17 19  --   --   --   --    AST 44 35 23  --   --   --   --    ALBUMIN 3.1* 2.8* 2.5*  --   --   --   --    WBC 33.77  33.77* 23.13* 13.38*  --   --   --   --    HGB 12.0 10.9* 10.6*  --   --   --   --    HCT 38.4 35.0* 33.8*  --   --   --   --      Nutrition Orders:  Diet NPO      Appetite/Oral Intake: NPO/NPO  Factors Affecting Nutritional Intake: impaired cognitive status/motor control, difficulty/impaired swallowing, and NPO  Social Needs Impacting Access to Food: none identified  Food/Christian/Cultural Preferences: none reported  Food Allergies: no known food allergies  Last Bowel Movement: 05/12/25  Wound(s):     Wound 05/08/25 0308 Abrasion(s) Left-Tissue loss description: Partial thickness       Wound 05/08/25 0309 Abrasion(s) Right-Tissue loss description: Partial thickness       Wound 05/08/25 0310 Pressure Injury Perirectal-Tissue loss description: Not applicable     Comments    5/13:  "Pt NPO x2 days / mental status, mentation appears to be improving. Poor PO intake prior to NPO noted, with 0-25% intake of meals documented. Pt agreed to ONS once diet advanced. Pt reports good appetite PTA. Denies n/v/d/c; LBM . Denies unintentional weight loss. Weight appears stable per EMR weight history review.    Anthropometrics    Height: 5' 4.5" (163.8 cm), Height Method: Stated  Last Weight: 91.9 kg (202 lb 9.6 oz) (25 0009), Weight Method: Bed Scale  BMI (Calculated): 34.3  BMI Classification: obese grade I (BMI 30-34.9)     Ideal Body Weight (IBW), Female: 122.5 lb     % Ideal Body Weight, Female (lb): 165.39 %                    Usual Body Weight (UBW), k.1 kg  % Usual Body Weight: 105.73     Usual Weight Provided By: EMR weight history and patient denies unintentional weight loss    Wt Readings from Last 5 Encounters:   25 91.9 kg (202 lb 9.6 oz)   24 87.1 kg (192 lb)   08/10/20 87.1 kg (192 lb 0.3 oz)     Weight Change(s) Since Admission:   Wt Readings from Last 1 Encounters:   25 0009 91.9 kg (202 lb 9.6 oz)   25 0000 91.9 kg (202 lb 9.6 oz)   25 1844 88.5 kg (195 lb)   Admit Weight: 88.5 kg (195 lb) (25 1844), Weight Method: Stated    Estimated Needs    Weight Used For Calorie Calculations: 91.9 kg (202 lb 9.6 oz)  Energy Calorie Requirements (kcal): 2213-2412 kcal (1.1-1.2 SF)  [facility]  Weight Used For Protein Calculations: 91.9 kg (202 lb 9.6 oz)  Protein Requirements: 92 gm (1.0 gm/kg)  Fluid Requirements (mL): 2298 mL (25 mL/kg)        Enteral Nutrition     Patient not receiving enteral nutrition at this time.    Parenteral Nutrition     Patient not receiving parenteral nutrition support at this time.    Evaluation of Received Nutrient Intake    Calories: not meeting estimated needs  Protein: not meeting estimated needs    Patient Education     Not applicable.    Nutrition Diagnosis     PES: Inadequate oral intake " related to acute illness as evidenced by poor PO intake. (new)     Nutrition Interventions     Intervention(s): general/healthful diet, commercial beverage, and collaboration with other providers    Goal: Meet greater than 80% of nutritional needs by follow-up. (new)    Nutrition Goals & Monitoring     Dietitian will monitor: food and beverage intake and gastrointestinal profile  Discharge planning: resume home regimen  Nutrition Risk/Follow-Up: patient at increased nutrition risk; dietitian will follow-up twice weekly   Please consult if re-assessment needed sooner.

## 2025-05-13 NOTE — ASSESSMENT & PLAN NOTE
Significant improvement this a.m.  Likely cefepime induced  MRI no longer indicated  Further recommendations to follow per MD

## 2025-05-13 NOTE — PT/OT/SLP PROGRESS
Physical Therapy Treatment    Patient Name:  Shyanne Cannon   MRN:  [id number]    Recommendations:     Discharge therapy intensity: Moderate Intensity Therapy   Discharge Equipment Recommendations: to be determined by next level of care  Barriers to discharge: Decreased caregiver support and Impaired mobility    Assessment:     Shyanne Cannon is a 74 y.o. female admitted with a medical diagnosis of  R lower lobe pneumonia 2/2 aspiration pneumonia, fall, hx of parkinson's. .  She presents with the following impairments/functional limitations: weakness, gait instability, impaired balance, impaired endurance, decreased safety awareness, impaired functional mobility .    Pt with decreased safety awareness and remains a fall risk. Lives alone. Rec mod intensity upon d/c.     Rehab Prognosis: Good; patient would benefit from acute skilled PT services to address these deficits and reach maximum level of function.    Recent Surgery: * No surgery found *      Plan:     During this hospitalization, patient would benefit from acute PT services 5 x/week to address the identified rehab impairments via gait training, therapeutic activities, therapeutic exercises and progress toward the following goals:    Plan of Care Expires:  06/09/25    Subjective     Chief Complaint:   Patient/Family Comments/goals:   Pain/Comfort:  Pain Rating 1: 0/10      Objective:     Communicated with NSG prior to session.  Patient found HOB elevated with bed alarm upon PT entry to room.     General Precautions: Standard, aspiration  Orthopedic Precautions: N/A  Braces: N/A  Respiratory Status: Nasal cannula, flow 2 L/min  Blood Pressure:   Skin Integrity: Visible skin intact      Functional Mobility:  Bed Mobility:     Scooting: minimum assistance  Transfers:     Sit to Stand:  minimum assistance with rolling walker and 1 trial from EOB and 1 trial from toilet   Toilet Transfer: minimum assistance with  rolling walker  using  Step Transfer and  pt with mild BLE instability but no buckling noted.   Gait: pt amb 22ft 2x with RW Ricardo. Pt with short step-through gait pattern. Mild BLE instability throughout both trials but no buckling noted. Seated rest between trials.     Education:  Patient provided with verbal education education regarding fall prevention and safety awareness.  Additional teaching is warranted.     Patient left up in chair with all lines intact, call button in reach, chair alarm on, and geomat cushion    GOALS:   Multidisciplinary Problems       Physical Therapy Goals          Problem: Physical Therapy    Goal Priority Disciplines Outcome Interventions   Physical Therapy Goal     PT, PT/OT Progressing    Description: Goals to be met by: 25     Patient will increase functional independence with mobility by performin. Supine to sit with Modified [place]  2. Sit to supine with Modified [place]  3. Sit to stand transfer with Modified [place]  4. Bed to chair transfer with Modified [place] using Rolling Walker  5. Gait  x 150 feet with Modified [place] using Rolling Walker.                          Time Tracking:     PT Received On: 25  PT Start Time: 1346     PT Stop Time: 1402  PT Total Time (min): 16 min     Billable Minutes: Gait Training 16    Treatment Type: Treatment  PT/PTA: PTA     Number of PTA visits since last PT visit: 2025

## 2025-05-13 NOTE — SUBJECTIVE & OBJECTIVE
Subjective:     Interval History:   Naming significantly improved this a.m., as patient is able to answer questions appropriately and follow commands.  No longer repeating words.    Current Neurological Medications:     Current Medications[1]    Review of Systems  A 14pt ros was reviewed & is negative unless o/w documented in the hpi    Objective:     Vital Signs (Most Recent):  Temp: 98 °F (36.7 °C) (05/13/25 0730)  Pulse: 77 (05/13/25 0859)  Resp: 18 (05/13/25 0859)  BP: 129/70 (05/13/25 0730)  SpO2: 95 % (05/13/25 0859) Vital Signs (24h Range):  Temp:  [97.5 °F (36.4 °C)-98 °F (36.7 °C)] 98 °F (36.7 °C)  Pulse:  [] 77  Resp:  [12-24] 18  SpO2:  [86 %-98 %] 95 %  BP: ()/(65-81) 129/70     Weight: 91.9 kg (202 lb 9.6 oz)  Body mass index is 34.24 kg/m².     Physical Exam   GENERAL: NAD, calm, cooperative, appropriate, awake/alert  MENTAL STATUS: Oriented x4, follows commands reliably  SPEECH/LANGUAGE: Clear, coherent  CN:  gaze conjugate  PERRLA  Motor: no focal motor weakness  Sensory: Normal to tactile stim/vibration  Gait: not observed         Significant Labs:   Recent Lab Results         05/13/25  0538        Anion Gap 11.0       BUN 31.4       BUN/CREAT RATIO 37       Calcium 10.3       Chloride 92       CO2 30       Creatinine 0.85       eGFR >60  Comment: Estimated GFR calculated using the CKD-EPI creatinine (2021) equation.       Glucose 116       Potassium 4.4       Sodium 133               Significant Imaging: I have reviewed all pertinent imaging results/findings within the past 24 hours.       [1]   Current Facility-Administered Medications   Medication Dose Route Frequency Provider Last Rate Last Admin    acetaminophen tablet 650 mg  650 mg Oral Q4H PRN Maritza Dominguez, FNP        albuterol-ipratropium 2.5 mg-0.5 mg/3 mL nebulizer solution 3 mL  3 mL Nebulization Q4H PRN Jose Dominguezyssa, FNP        albuterol-ipratropium 2.5 mg-0.5 mg/3 mL nebulizer solution 3 mL  3 mL Nebulization Q4H  Maria Teresa Osman MD   3 mL at 05/13/25 0807    aluminum-magnesium hydroxide-simethicone 200-200-20 mg/5 mL suspension 30 mL  30 mL Oral QID PRN Maritza Dominguez, CADENP        bisacodyL suppository 10 mg  10 mg Rectal Daily PRN Lianet Dominguezsa, FNP        dextrose 50% injection 12.5 g  12.5 g Intravenous PRN Angelica, Maritza, FNP        dextrose 50% injection 25 g  25 g Intravenous PRN Lianet Dominguezsa, FNP        DULoxetine DR capsule 60 mg  60 mg Oral Daily Marc Isaacs, NP   60 mg at 05/13/25 0859    fluticasone furoate-vilanteroL 100-25 mcg/dose diskus inhaler 1 puff  1 puff Inhalation Daily Susie Londono MD   1 puff at 05/13/25 0859    glucagon (human recombinant) injection 1 mg  1 mg Intramuscular PRN Angelica, Maritza, FNP        glucose chewable tablet 16 g  16 g Oral PRN Lianet Dominguezsa, CADENP        glucose chewable tablet 24 g  24 g Oral PRN Lianet Dominguezsa, FNP        melatonin tablet 6 mg  6 mg Oral Nightly PRN Maritza Dominguez, FNP   6 mg at 05/08/25 2110    nystatin-triamcinolone cream   Topical (Top) TID Reyes, Thairy G, DO   Given at 05/13/25 0859    piperacillin-tazobactam (ZOSYN) 4.5 g in D5W 100 mL IVPB (MB+)  4.5 g Intravenous Q8H Maria Teresa Osman MD 25 mL/hr at 05/13/25 1025 4.5 g at 05/13/25 1025    polyethylene glycol packet 17 g  17 g Oral Daily Susie Londono MD   17 g at 05/11/25 1030    senna tablet 8.6 mg  8.6 mg Oral Daily Susie Londono MD   8.6 mg at 05/11/25 1030    sodium chloride 0.9% flush 10 mL  10 mL Intravenous PRN Lianet Dominguezsa, FNP        trazodone split tablet 25 mg  25 mg Oral QHS Marc Isaacs, NP

## 2025-05-13 NOTE — PT/OT/SLP EVAL
[facility]  Speech Language Pathology Department  Clinical Swallow Evaluation    Patient Name:  Shyanne Cannon   MRN:  [id number]    Recommendations     General recommendations:  SLP follow up x1  Solid texture recommendation:  Soft & Bite Sized Diet - IDDSI Level 6  Liquid consistency recommendation: Thin liquids - IDDSI Level 0   Medications: per patient preference  Swallow strategies/precautions: small bites/sips and slow rate  Precautions: aspiration    History     Shyanne Cannon is a/n 74 y.o. female with history of parkinsonism and COPD was admitted following fall and new onset weakness. Chest imaging shows  ground-glass opacities are noted in the right lower lobe.     MBS completed 5/8 with recommendations of soft and bite sized solids and and thin liquids. 5/11 Pt made NPO d/t requiring BiPAP for respiratory support. SLP to re-evaluate swallow function.       Home diet texture/consistency: Regular and thin liquids  Current method of nutrition: NPO    Imaging   Results for orders placed during the hospital encounter of 05/07/25    X-Ray Chest 1 View    Narrative  EXAMINATION:  XR CHEST 1 VIEW    CLINICAL HISTORY:  Sob;  TECHNIQUE:  One view  COMPARISON:  December 19, 2024..    FINDINGS:  Cardiopericardial silhouette is within normal limits.  There are bilateral lungs infiltrates which are more pronounced and more distributed on the right for which infectious cause is favored.  Please correlate clinically.  No significant fluid within the pleural spaces.  No pneumothorax.  Right axillary metallic clips.  Impression  Lungs infiltrates more distributed on the right.    Electronically signed by: Dank Barcenas  Date:    05/11/2025  Time:    13:21  No results found for this or any previous visit  Results for orders placed during the hospital encounter of 12/18/24    MRI Brain Without Contrast    Narrative  EXAMINATION:  MRI BRAIN WITHOUT CONTRAST    CLINICAL  HISTORY:  AMS;  TECHNIQUE:  Multiplanar multisequence MR imaging of the brain was performed without contrast.  COMPARISON:  CT scan dated 12/18/2024  FINDINGS:  No intracranial mass or lesion is seen.  No hemorrhage is seen.  No acute infarct is seen.  No restricted diffusion abnormality is seen.  There is diffuse cerebral atrophy seen along with some compensatory ventricular dilatation and periventricular white matter change consistent with patient's age.  Posterior fossa appears normal.  Calvarium is intact.  Paranasal sinuses appear grossly unremarkable.  Impression  Chronic age related changes  Otherwise unremarkable  Electronically signed by: Derik Estes  Date:    12/19/2024  Time:    17:44    Subjective     Patient awake and alert.  Spiritual/Cultural/Shinto Beliefs/Practices that affect care: no  Pain/Comfort:  0/10    Objective     ORAL MUSCULATURE  Dentition: edentulous  Secretion Management: adequate  Mucosal Quality: good  Facial Movement: WFL  Vocal Quality: adequate    PO TRIALS  Consistency Fed By Oral Symptoms Pharyngeal Symptoms   Thin liquid by straw Self None None   Puree Self None None   Soft & Bite-sized solid Self None None   Regular solid Self Prolonged bolus formation/mastication None     Assessment     No signs/sx of aspiration observed. REC: soft and bite sized solids. SLP to follow up x1 regarding diet tolerance.     Outcome Measures     Functional Oral Intake Scale: 6 - Total oral diet with multiple consistencies without special preparation, but with specific food limitation    Education     Patient provided with verbal education regarding swallow function.  Understanding was verbalized.    Plan     SLP Follow-Up:  Yes   Plan of Care reviewed with:  patient     Time Tracking     SLP Treatment Date:   05/13/25  Speech Start Time:  1430  Speech Stop Time:  1445     Speech Total Time (min):  15 min    Billable minutes:  Swallow and Oral Function Evaluation, 15 minutes     05/13/2025

## 2025-05-13 NOTE — PROGRESS NOTES
[facility] [address]  Neurology  Progress Note    Patient Name: Shyanne Cannon  MRN: [id number]  Admission Date: 5/7/2025  Hospital Length of Stay: 6 days  Code Status: Full Code   Attending Provider: Reyes, Thairy G, DO  Primary Care Physician: Juanita Willingham FNP   Principal Problem:<principal problem not specified>    HPI:   75 y/o F with PMH bipolar, memory loss, HTN, HLD, COPD who presented to ED on 5/7 following a fall and complaints of weakness.  Patient has been on the ground by her daughter as patient was unable to get back up on her own.  Patient lives alone, performs IADLs independently, and ambulates with a rolling walker at baseline.  Patient found to have RLL PNA on imaging for which she was started on cefepime and vanc on admit. noted to have confusion, repeating words, and tremulous that began on 05/11, cefepime subsequently discontinued.    Initial CT head w/o on admit and repeat on 5/11 both unrevealing for acute intracranial abnormalities.    Overview/Hospital Course:  No notes on file        Subjective:     Interval History:   Naming significantly improved this a.m., as patient is able to answer questions appropriately and follow commands.  No longer repeating words.    Current Neurological Medications:     Current Medications[1]    Review of Systems  A 14pt ros was reviewed & is negative unless o/w documented in the hpi    Objective:     Vital Signs (Most Recent):  Temp: 98 °F (36.7 °C) (05/13/25 0730)  Pulse: 77 (05/13/25 0859)  Resp: 18 (05/13/25 0859)  BP: 129/70 (05/13/25 0730)  SpO2: 95 % (05/13/25 0859) Vital Signs (24h Range):  Temp:  [97.5 °F (36.4 °C)-98 °F (36.7 °C)] 98 °F (36.7 °C)  Pulse:  [] 77  Resp:  [12-24] 18  SpO2:  [86 %-98 %] 95 %  BP: ()/(65-81) 129/70     Weight: 91.9 kg (202 lb 9.6 oz)  Body mass index is 34.24 kg/m².     Physical Exam   GENERAL: NAD, calm, cooperative, appropriate, awake/alert  MENTAL STATUS: Oriented x4, follows  commands reliably  SPEECH/LANGUAGE: Clear, coherent  CN:  gaze conjugate  PERRLA  Motor: no focal motor weakness  Sensory: Normal to tactile stim/vibration  Gait: not observed         Significant Labs:   Recent Lab Results         05/13/25  0538        Anion Gap 11.0       BUN 31.4       BUN/CREAT RATIO 37       Calcium 10.3       Chloride 92       CO2 30       Creatinine 0.85       eGFR >60  Comment: Estimated GFR calculated using the CKD-EPI creatinine (2021) equation.       Glucose 116       Potassium 4.4       Sodium 133               Significant Imaging: I have reviewed all pertinent imaging results/findings within the past 24 hours.    Assessment and Plan:     Encephalopathy  Significant improvement this a.m.  Likely cefepime induced  MRI no longer indicated  Further recommendations to follow per MD        VTE Risk Mitigation (From admission, onward)           Ordered     Reason for No Pharmacological VTE Prophylaxis  Once        Comments: Will assess when patient is examined   Question:  Reasons:  Answer:  Physician Provided (leave comment)    05/07/25 2331     IP VTE HIGH RISK PATIENT  Once         05/07/25 2331     Place sequential compression device  Until discontinued         05/07/25 2331                    BÁRBARA Dudley-BC  Neurology  [facility]       [1]   Current Facility-Administered Medications   Medication Dose Route Frequency Provider Last Rate Last Admin    acetaminophen tablet 650 mg  650 mg Oral Q4H PRN Maritza Dominguez FNP        albuterol-ipratropium 2.5 mg-0.5 mg/3 mL nebulizer solution 3 mL  3 mL Nebulization Q4H PRN Maritza Dominguez FNP        albuterol-ipratropium 2.5 mg-0.5 mg/3 mL nebulizer solution 3 mL  3 mL Nebulization Q4H Maria Teresa Osman MD   3 mL at 05/13/25 0807    aluminum-magnesium hydroxide-simethicone 200-200-20 mg/5 mL suspension 30 mL  30 mL Oral QID PRN Maritza Dominguez FNP        bisacodyL suppository 10 mg  10 mg Rectal Daily  PRN Angelica, Maritza, FNP        dextrose 50% injection 12.5 g  12.5 g Intravenous PRN Angelica, Maritza, FNP        dextrose 50% injection 25 g  25 g Intravenous PRN Jose Dominguezyssa, FNP        DULoxetine DR capsule 60 mg  60 mg Oral Daily Marc Isaacs NP   60 mg at 05/13/25 0859    fluticasone furoate-vilanteroL 100-25 mcg/dose diskus inhaler 1 puff  1 puff Inhalation Daily Susie Londono MD   1 puff at 05/13/25 0859    glucagon (human recombinant) injection 1 mg  1 mg Intramuscular PRN Angelica, Maritza, FNP        glucose chewable tablet 16 g  16 g Oral PRN Angelica Maritza, FNP        glucose chewable tablet 24 g  24 g Oral PRN Lianet Dominguezsa, FNP        melatonin tablet 6 mg  6 mg Oral Nightly PRN Maritza Dominguez, FNP   6 mg at 05/08/25 2110    nystatin-triamcinolone cream   Topical (Top) TID Reyes, Thairy G, DO   Given at 05/13/25 0859    piperacillin-tazobactam (ZOSYN) 4.5 g in D5W 100 mL IVPB (MB+)  4.5 g Intravenous Q8H Maria Teresa Osman MD 25 mL/hr at 05/13/25 1025 4.5 g at 05/13/25 1025    polyethylene glycol packet 17 g  17 g Oral Daily Susie Londono MD   17 g at 05/11/25 1030    senna tablet 8.6 mg  8.6 mg Oral Daily Susie Londono MD   8.6 mg at 05/11/25 1030    sodium chloride 0.9% flush 10 mL  10 mL Intravenous PRN Angelica, Maritza, FNP        trazodone split tablet 25 mg  25 mg Oral QHS Marc Isaacs, NP

## 2025-05-13 NOTE — PROGRESS NOTES
[facility]  Wound Care    Patient Name:  Shyanne Cannon   MRN:  [id number]  Date: 5/13/2025  Diagnosis: <principal problem not specified>    History:     No past medical history on file.    Social History[1]    Precautions:     Allergies as of 05/07/2025 - Reviewed 05/07/2025   Allergen Reaction Noted    Cephalexin Hives 10/09/2015    Levofloxacin Hives, Other (See Comments), and Rash 10/09/2015       [facility] Assessment Details/Treatment     Follow up visit, noting some improvement with redness at gluteal cleft , Versette in use , however not working well, hygiene measures provided. Patient remains resting on a pressure redistribution mattress and demonstrated ability to mobilize self to off load her bony prominences with minimal assistance.      05/13/25 0830        Wound 05/08/25 0310 Pressure Injury Perirectal   Date First Assessed/Time First Assessed: 05/08/25 0310   Primary Wound Type: Pressure Injury  Location: Perirectal   Wound Image    Pressure Injury Stage   (blanchable erythema consistent with incontinence associated dermatitus)   Dressing Appearance Open to air   Drainage Amount None   Appearance Red;Intact   Tissue loss description Not applicable   Red (%), Wound Tissue Color 100 %   Periwound Area Intact;Dry   Wound Edges Other (see comments)  (gluteal cleft redness consistent with IAD)   Care Cleansed with:;Soap and water;Applied:;Skin Barrier   Off Loading   (turns self in bed.)       Recommendations made to primary team for local wound and skin care measures and avoid use of VERSETTE, and pressure injury prevention measures. Orders placed.     05/13/2025         [1]   Social History  Socioeconomic History    Marital status: Single     Social Drivers of Health     Financial Resource Strain: Low Risk  (5/10/2025)    Overall Financial Resource Strain (CARDIA)     Difficulty of Paying Living Expenses: Not very hard   Food Insecurity: No Food Insecurity (5/10/2025)     Hunger Vital Sign     Worried About Running Out of Food in the Last Year: Never true     Ran Out of Food in the Last Year: Never true   Transportation Needs: Unmet Transportation Needs (5/10/2025)    PRAPARE - Transportation     Lack of Transportation (Medical): Yes     Lack of Transportation (Non-Medical): Yes   Physical Activity: Inactive (5/8/2025)    Exercise Vital Sign     Days of Exercise per Week: 0 days     Minutes of Exercise per Session: 0 min   Stress: Stress Concern Present (5/10/2025)    [employer] - Occupational Stress Questionnaire     Feeling of Stress : To some extent   Housing Stability: Low Risk  (5/10/2025)    Housing Stability Vital Sign     Unable to Pay for Housing in the Last Year: No     Homeless in the Last Year: No

## 2025-05-13 NOTE — PT/OT/SLP PROGRESS
Hold OT session at this time, pending MRI results, follow up as appropriate and schedule permits.

## 2025-05-13 NOTE — PT/OT/SLP PROGRESS
Occupational Therapy   Treatment    Name: Shyanne Cannon  MRN: [id number]  Admitting Diagnosis:   R lower lobe pneumonia 2/2 aspiration pneumonia, fall, hx of parkinson's.        Recommendations:     Recommended therapy intensity at discharge: Moderate Intensity Therapy   Discharge Equipment Recommendations:  bath bench, bedside commode  Barriers to discharge:       Assessment:     Shyanne Cannon is a 74 y.o. female with a medical diagnosis of  R lower lobe pneumonia 2/2 aspiration pneumonia, fall, hx of parkinson's. .  She presents with alert and cooperative, pt. Progressing well, improved balance and increased endurance. Recommending Mod intensity therapy pending progress. Performance deficits affecting function are weakness, impaired endurance, impaired self care skills, impaired functional mobility, gait instability, impaired balance, decreased upper extremity function.     Rehab Prognosis:  Good; patient would benefit from acute skilled OT services to address these deficits and reach maximum level of function.       Plan:     Patient to be seen 5 x/week to address the above listed problems via self-care/home management, therapeutic activities, therapeutic exercises  Plan of Care Expires: 06/09/25  Plan of Care Reviewed with: patient    Subjective     Pain/Comfort:       Objective:     Communicated with: RN prior to session.  Patient found HOB elevated with   upon OT entry to room.    General Precautions: Standard, aspiration    Orthopedic Precautions:   Braces:    Respiratory Status: Nasal cannula, flow 2 L/min  Vital Signs: Sp02: 96     Occupational Performance:   (Bed mobility-Min A)  (Sitting balance- SBA)  (Sit to stand- Min A) from EOB  Pt. Ambulating from EOB to bathroom using RW for UE support with balance. Min A  Toilet t/f- Min A for safe descend onto low surface. Pt. Left UIC with all needs within reach.  Therapeutic Positioning    OT interventions performed during the course of today's  session in an effort to prevent and/or reduce acquired pressure injuries:   Therapeutic positioning was provided at the conclusion of session to offload all bony prominences for the prevention and/or reduction of pressure injuries      [facility] 6 Click ADL:      Patient Education:  Patient provided with verbal education education regarding fall prevention, safety awareness, and pressure ulcer prevention.  Additional teaching is warranted.      Patient left up in chair with all lines intact and call button in reach.    GOALS:   Multidisciplinary Problems       Occupational Therapy Goals          Problem: Occupational Therapy    Goal Priority Disciplines Outcome Interventions   Occupational Therapy Goal     OT, PT/OT Progressing    Description: Goals to be met by: in 1 month      Patient will increase functional independence with ADLs by performing:    UE Dressing with Modified [place].  LE Dressing with Modified [place].  Grooming while standing at sink with Modified [place].  Toileting from toilet with Modified [place] for hygiene and clothing management.   Toilet transfer to toilet with Modified [place].  Increased functional strength to 4/5 for BUE.                         Time Tracking:     OT Date of Treatment: 05/13/25  OT Start Time: 1345  OT Stop Time: 1402  OT Total Time (min): 17 min    Billable Minutes:Self Care/Home Management 1    OT/BINA: BINA     Number of BINA visits since last OT visit: 1    5/13/2025

## 2025-05-14 LAB
ANION GAP SERPL CALC-SCNC: 8 MEQ/L
BASOPHILS # BLD AUTO: 0.07 X10(3)/MCL
BASOPHILS NFR BLD AUTO: 0.6 %
BUN SERPL-MCNC: 27.1 MG/DL (ref 9.8–20.1)
CALCIUM SERPL-MCNC: 10.3 MG/DL (ref 8.4–10.2)
CHLORIDE SERPL-SCNC: 91 MMOL/L (ref 98–107)
CO2 SERPL-SCNC: 36 MMOL/L (ref 23–31)
CREAT SERPL-MCNC: 0.93 MG/DL (ref 0.55–1.02)
CREAT/UREA NIT SERPL: 29
EOSINOPHIL # BLD AUTO: 0.35 X10(3)/MCL (ref 0–0.9)
EOSINOPHIL NFR BLD AUTO: 3.1 %
ERYTHROCYTE [DISTWIDTH] IN BLOOD BY AUTOMATED COUNT: 15 % (ref 11.5–17)
GFR SERPLBLD CREATININE-BSD FMLA CKD-EPI: >60 ML/MIN/1.73/M2
GLUCOSE SERPL-MCNC: 124 MG/DL (ref 82–115)
HCT VFR BLD AUTO: 38.9 % (ref 37–47)
HGB BLD-MCNC: 12.3 G/DL (ref 12–16)
IMM GRANULOCYTES # BLD AUTO: 0.28 X10(3)/MCL (ref 0–0.04)
IMM GRANULOCYTES NFR BLD AUTO: 2.5 %
LYMPHOCYTES # BLD AUTO: 1.78 X10(3)/MCL (ref 0.6–4.6)
LYMPHOCYTES NFR BLD AUTO: 15.8 %
MCH RBC QN AUTO: 26.6 PG (ref 27–31)
MCHC RBC AUTO-ENTMCNC: 31.6 G/DL (ref 33–36)
MCV RBC AUTO: 84.2 FL (ref 80–94)
MONOCYTES # BLD AUTO: 0.92 X10(3)/MCL (ref 0.1–1.3)
MONOCYTES NFR BLD AUTO: 8.2 %
NEUTROPHILS # BLD AUTO: 7.84 X10(3)/MCL (ref 2.1–9.2)
NEUTROPHILS NFR BLD AUTO: 69.8 %
NRBC BLD AUTO-RTO: 0 %
PLATELET # BLD AUTO: 349 X10(3)/MCL (ref 130–400)
PMV BLD AUTO: 9.4 FL (ref 7.4–10.4)
POTASSIUM SERPL-SCNC: 3.8 MMOL/L (ref 3.5–5.1)
RBC # BLD AUTO: 4.62 X10(6)/MCL (ref 4.2–5.4)
SODIUM SERPL-SCNC: 135 MMOL/L (ref 136–145)
WBC # BLD AUTO: 11.24 X10(3)/MCL (ref 4.5–11.5)

## 2025-05-14 PROCEDURE — 94761 N-INVAS EAR/PLS OXIMETRY MLT: CPT

## 2025-05-14 PROCEDURE — 25000003 PHARM REV CODE 250: Performed by: STUDENT IN AN ORGANIZED HEALTH CARE EDUCATION/TRAINING PROGRAM

## 2025-05-14 PROCEDURE — 21400001 HC TELEMETRY ROOM

## 2025-05-14 PROCEDURE — 97530 THERAPEUTIC ACTIVITIES: CPT

## 2025-05-14 PROCEDURE — 36415 COLL VENOUS BLD VENIPUNCTURE: CPT | Performed by: INTERNAL MEDICINE

## 2025-05-14 PROCEDURE — 63600175 PHARM REV CODE 636 W HCPCS: Performed by: INTERNAL MEDICINE

## 2025-05-14 PROCEDURE — 25000003 PHARM REV CODE 250: Performed by: INTERNAL MEDICINE

## 2025-05-14 PROCEDURE — 94660 CPAP INITIATION&MGMT: CPT

## 2025-05-14 PROCEDURE — 94640 AIRWAY INHALATION TREATMENT: CPT

## 2025-05-14 PROCEDURE — 27100171 HC OXYGEN HIGH FLOW UP TO 24 HOURS

## 2025-05-14 PROCEDURE — 25000242 PHARM REV CODE 250 ALT 637 W/ HCPCS: Performed by: INTERNAL MEDICINE

## 2025-05-14 PROCEDURE — 94760 N-INVAS EAR/PLS OXIMETRY 1: CPT

## 2025-05-14 PROCEDURE — 99900035 HC TECH TIME PER 15 MIN (STAT)

## 2025-05-14 PROCEDURE — 97535 SELF CARE MNGMENT TRAINING: CPT

## 2025-05-14 PROCEDURE — 99900031 HC PATIENT EDUCATION (STAT)

## 2025-05-14 PROCEDURE — 94799 UNLISTED PULMONARY SVC/PX: CPT | Mod: XB

## 2025-05-14 PROCEDURE — 25000003 PHARM REV CODE 250

## 2025-05-14 PROCEDURE — 25000242 PHARM REV CODE 250 ALT 637 W/ HCPCS: Performed by: STUDENT IN AN ORGANIZED HEALTH CARE EDUCATION/TRAINING PROGRAM

## 2025-05-14 PROCEDURE — 80048 BASIC METABOLIC PNL TOTAL CA: CPT | Performed by: INTERNAL MEDICINE

## 2025-05-14 PROCEDURE — 11000001 HC ACUTE MED/SURG PRIVATE ROOM

## 2025-05-14 PROCEDURE — 85025 COMPLETE CBC W/AUTO DIFF WBC: CPT | Performed by: INTERNAL MEDICINE

## 2025-05-14 RX ADMIN — FLUTICASONE FUROATE AND VILANTEROL TRIFENATATE 1 PUFF: 100; 25 POWDER RESPIRATORY (INHALATION) at 09:05

## 2025-05-14 RX ADMIN — IPRATROPIUM BROMIDE AND ALBUTEROL SULFATE 3 ML: .5; 3 SOLUTION RESPIRATORY (INHALATION) at 04:05

## 2025-05-14 RX ADMIN — TRAZODONE HYDROCHLORIDE 25 MG: 50 TABLET ORAL at 09:05

## 2025-05-14 RX ADMIN — NYSTATIN AND TRIAMCINOLONE ACETONIDE: 100000; 1 CREAM TOPICAL at 09:05

## 2025-05-14 RX ADMIN — IPRATROPIUM BROMIDE AND ALBUTEROL SULFATE 3 ML: .5; 3 SOLUTION RESPIRATORY (INHALATION) at 11:05

## 2025-05-14 RX ADMIN — PIPERACILLIN SODIUM AND TAZOBACTAM SODIUM 4.5 G: 4; .5 INJECTION, POWDER, LYOPHILIZED, FOR SOLUTION INTRAVENOUS at 06:05

## 2025-05-14 RX ADMIN — PIPERACILLIN SODIUM AND TAZOBACTAM SODIUM 4.5 G: 4; .5 INJECTION, POWDER, LYOPHILIZED, FOR SOLUTION INTRAVENOUS at 03:05

## 2025-05-14 RX ADMIN — IPRATROPIUM BROMIDE AND ALBUTEROL SULFATE 3 ML: .5; 3 SOLUTION RESPIRATORY (INHALATION) at 07:05

## 2025-05-14 RX ADMIN — PIPERACILLIN SODIUM AND TAZOBACTAM SODIUM 4.5 G: 4; .5 INJECTION, POWDER, LYOPHILIZED, FOR SOLUTION INTRAVENOUS at 11:05

## 2025-05-14 RX ADMIN — DULOXETINE 60 MG: 30 CAPSULE, DELAYED RELEASE ORAL at 09:05

## 2025-05-14 RX ADMIN — IPRATROPIUM BROMIDE AND ALBUTEROL SULFATE 3 ML: .5; 3 SOLUTION RESPIRATORY (INHALATION) at 08:05

## 2025-05-14 RX ADMIN — NYSTATIN AND TRIAMCINOLONE ACETONIDE: 100000; 1 CREAM TOPICAL at 03:05

## 2025-05-14 NOTE — PLAN OF CARE
[facility] sent clinical updates to [facility] via [employer] [facility].

## 2025-05-14 NOTE — PROGRESS NOTES
[facility] Hospital Medicine Progress Note        Chief Complaint: Inpatient Follow-up for     HPI: Shyanne Cannon is a 74 y.o. female who has a medical history of bipolar disorder, anxiety, parkinsonism, memory loss, hypertension, hyperlipidemia, overactive bladder, chronic constipation, and COPD.  The patient presented to [facility] on 5/7/2025 with a primary complaint of fall and weakness.  Patient mentions that she has indoor 2 falls in last 4 days.  Yesterday, she had a fall as she was grabbing a picture of ice tea from the Fridge.  She fell to the ground and drop the entire patient advised to be on herself and the floor. After falling, she was too weak to stand back up to her feet so she laid there until her daughter came home.  She mentions that she lives alone and moves around with a rolling walker.  Daughter does daily wellness checks.  Patient denies any fever, chills, body aches, nausea, vomiting, loss his consciousness, head trauma, loss of bowel or bladder, or cough.     In the ED, vital signs were found to be normal.  CBC shows WBC count of 33 K. CMP is unremarkable.  CPK slightly elevated at 1443.  Troponin and lactic acid are WNL.  CT head shows no acute intracranial pathology.  CT abdomen pelvis shows no posttraumatic abnormality of the abdomen and pelvis.  CT spine shows no acute osseous abnormality, however ground-glass opacities are noted in the right lower lobe.  Blood cultures, urine cultures, sputum cultures pending.       Continue with vancomycin and cefepime, add DuoNebs p.r.n., Breo Ellipta.  SLP, PT/OT, and cm consulted.  Follow up cultures and narrow antibiotics as indicated.    Interval Hx:    Mental status is back to normal. Pt is sitting up and was cleared by Speech for a diet.    MRI brain canceled.    Case was discussed with patient's nurse and  on the floor.    Objective/physical exam:  General: In no acute distress, afebrile, tremor in  RUE  Chest: Poor air movement  Heart: RRR, +S1, S2, no appreciable murmur  Abdomen: Soft, nontender, BS +  MSK: Warm, no lower extremity edema, no clubbing or cyanosis  Neurologic: ALert but confused oriented to self and place, Cranial nerve II-XII intact, Strength 5/5 in all 4 extremities    VITAL SIGNS: 24 HRS MIN & MAX LAST   Temp  Min: 97.5 °F (36.4 °C)  Max: 98.1 °F (36.7 °C) 98.1 °F (36.7 °C)   BP  Min: 106/72  Max: 146/80 106/72   Pulse  Min: 76  Max: 110  90   Resp  Min: 12  Max: 24 18   SpO2  Min: 92 %  Max: 98 % (!) 94 %     I have reviewed the following labs:  Recent Labs   Lab 05/07/25 1959 05/08/25 0514 05/09/25  0656   WBC 33.77  33.77* 23.13* 13.38*   RBC 4.48 4.05* 3.97*   HGB 12.0 10.9* 10.6*   HCT 38.4 35.0* 33.8*   MCV 85.7 86.4 85.1   MCH 26.8* 26.9* 26.7*   MCHC 31.3* 31.1* 31.4*   RDW 15.3 15.3 15.1    294 301   MPV 9.5 9.7 9.6     Recent Labs   Lab 05/07/25 1959 05/08/25 0514 05/09/25  0656 05/10/25  1026 05/11/25  0447 05/11/25  1430 05/12/25  0718 05/13/25  0538   * 132* 133*   < > 133*  --  136 133*   K 4.3 4.0 4.5   < > 4.7  --  4.6 4.4   CL 93* 95* 94*   < > 91*  --  93* 92*   CO2 31 30 30   < > 31  --  31 30   BUN 24.9* 23.1* 15.5   < > 19.3  --  28.5* 31.4*   CREATININE 1.07* 0.81 0.77   < > 0.68  --  0.71 0.85   * 99 126*   < > 110  --  149* 116*   CALCIUM 9.8 9.5 9.6   < > 10.3*  --  10.6* 10.3*   PH  --   --   --   --   --  7.460*  --   --    MG  --  1.60 1.30*  --   --   --   --   --    ALBUMIN 3.1* 2.8* 2.5*  --   --   --   --   --    PROT 6.6 6.0 5.8  --   --   --   --   --    ALKPHOS 96 91 81  --   --   --   --   --    ALT 19 17 19  --   --   --   --   --    AST 44 35 23  --   --   --   --   --    BILITOT 0.4 0.4 0.3  --   --   --   --   --     < > = values in this interval not displayed.     Microbiology Results (last 7 days)       Procedure Component Value Units Date/Time    Blood culture #2 **CANNOT BE ORDERED STAT** [[id number]]  (Normal) Collected:  05/07/25 2208    Order Status: Completed Specimen: Blood Updated: 05/13/25 0105     Blood Culture No Growth at 5 days    Blood culture #1 **CANNOT BE ORDERED STAT** [[id number]]  (Normal) Collected: 05/07/25 2208    Order Status: Completed Specimen: Blood Updated: 05/13/25 0105     Blood Culture No Growth at 5 days    Respiratory Culture [[id number]]     Order Status: Sent Specimen: Sputum              See below for Radiology    Assessment   Worsening encephalopathy -resolved  Right lower lobe pneumonia; possibly due to aspiration pneumonia  Fall and weakness  Leukocytosis - improving  Hypercalcemia, corrected 11     Other medical history includes bipolar disorder, anxiety, parkinsonism, memory loss, hypertension, hyperlipidemia, overactive bladder, chronic constipation, COPD     -CT completed head to toe which shows no acute abnormalities except for right lower lung pneumonia - ST recs soft diet with thin liquids  -started on vancomycin-stopped and cefepime; treat for 5-7 days  - Check VBG  - Bipap nightly  -follow up with blood, urine, sputum culture  -daily labs  -SLP, PT/OT, and cm consulted  -bowel regimen  -  CTH stable, ABG stable. will check bladder scan, UDS, tx for COPD exacerbation  - MRI brain - canceled as pt is now alert  - Neurology consult, appreciate recommendations  - Stopped cefepime due to concerns for neurotoxicity     VTE Prophylaxis:  Lovenox     Patient condition:  Stable    Anticipated discharge and Disposition:  Pending SNF placement      All diagnosis and differential diagnosis have been reviewed; assessment and plan has been documented; I have personally reviewed the labs and test results that are presently available; I have reviewed the patients medication list; I have reviewed the consulting providers response and recommendations. I have reviewed or attempted to review medical records based upon their availability    All of the patient's questions have been  addressed and answered.  Patient's is agreeable to the above stated plan. I will continue to monitor closely and make adjustments to medical management as needed.    Portions of this note dictated using EMR integrated voice recognition software, and may be subject to voice recognition errors not corrected at proofreading. Please contact writer for clarification if needed.   _____________________________________________________________________    Malnutrition Status:  Nutrition consulted. Most recent weight and BMI monitored-     Measurements:  Wt Readings from Last 1 Encounters:   05/08/25 91.9 kg (202 lb 9.6 oz)   Body mass index is 34.24 kg/m².    Patient has been screened and assessed by RD.    Malnutrition Type:  Context:    Level: other (see comments) (Does not meet criteria)    Malnutrition Characteristic Summary:  Weight Loss (Malnutrition): other (see comments) (Does not meet criteria)  Energy Intake (Malnutrition): less than or equal to 50% for greater than or equal to 5 days  Fluid Accumulation (Malnutrition): other (see comments) (Not present)    Interventions/Recommendations (treatment strategy):        Scheduled Med:   albuterol-ipratropium  3 mL Nebulization Q4H    DULoxetine  60 mg Oral Daily    fluticasone furoate-vilanteroL  1 puff Inhalation Daily    nystatin-triamcinolone   Topical (Top) TID    piperacillin-tazobactam (Zosyn) IV (PEDS and ADULTS) (extended infusion is not appropriate)  4.5 g Intravenous Q8H    polyethylene glycol  17 g Oral Daily    senna  8.6 mg Oral Daily    traZODone  25 mg Oral QHS      Continuous Infusions:     PRN Meds:    Current Facility-Administered Medications:     acetaminophen, 650 mg, Oral, Q4H PRN    albuterol-ipratropium, 3 mL, Nebulization, Q4H PRN    aluminum-magnesium hydroxide-simethicone, 30 mL, Oral, QID PRN    bisacodyL, 10 mg, Rectal, Daily PRN    dextrose 50%, 12.5 g, Intravenous, PRN    dextrose 50%, 25 g, Intravenous, PRN    glucagon (human recombinant), 1 mg, Intramuscular,  PRN    glucose, 16 g, Oral, PRN    glucose, 24 g, Oral, PRN    melatonin, 6 mg, Oral, Nightly PRN    sodium chloride 0.9%, 10 mL, Intravenous, PRN     Radiology:  I have personally reviewed the following imaging and agree with the radiologist.     CT Head Without Contrast  Narrative: CT head without contrast    INDICATION:  Mental status change of unknown cause para    TECHNIQUE:  Routine CT of the head was without contrast    Total DLP: 988 mGy.cm    Automatic exposure control was utilized to reduce the patient's dose    COMPARISON:  05/07/2025    FINDINGS:  No acute intra-cranial hemorrhage, midline shift, mass effect or extra-axial collection.    The ventricles are normal in size and configuration.  There are mild patchy areas of decreased attenuation in periventricular and deep white matter, while nonspecific, most likely sequela of chronic microvascular ischemia.    No sulcal effacement.  Normal grey-white matter differentiation.    Visualized osseous structures are unremarkable.  Visualized paranasal sinuses and mastoid air cells are clear.  Impression: No acute intracranial abnormality or significant interval changes    Electronically signed by: Jerzy Humphrey MD  Date:    05/11/2025  Time:    15:25  X-Ray Chest 1 View  Narrative: EXAMINATION:  XR CHEST 1 VIEW    CLINICAL HISTORY:  Sob;    TECHNIQUE:  One view    COMPARISON:  December 19, 2024..    FINDINGS:  Cardiopericardial silhouette is within normal limits.  There are bilateral lungs infiltrates which are more pronounced and more distributed on the right for which infectious cause is favored.  Please correlate clinically.  No significant fluid within the pleural spaces.  No pneumothorax.  Right axillary metallic clips.  Impression: Lungs infiltrates more distributed on the right.    Electronically signed by: Dank Barcenas  Date:    05/11/2025  Time:    13:21      Maria Teresa Osman MD  Department of Hospital Medicine   [facility]  [place]   05/13/2025

## 2025-05-14 NOTE — PROGRESS NOTES
"5/14/2025  Shyanne Cannon   1950   [id number]        Psychiatry Progress Note     SUBJECTIVE:   Ms. Cannno is a 74-year-old female with a past psychiatric history of bipolar disorder, anxiety, and dementia. Also with past medical history significant for Parkinson's. Psychiatry consulted due to trigger of level 2.   Upon evaluation she is resting quietly in bed and in no apparent distress. Answers open ended questions with a mumbled word that sounds like "dasani". Is able to answer yes/no questions but sometimes requires questions to be asked several times for her to answer with a yes or no. Her nurse reports that she was able to display orientation to self earlier today but is unable to answer with the location or time at present. She denies feeling sad, depressed, anxious, or nervous. Affect is reactive, euthymic, and full-range. She does deny impairments in sleep or appetite. She denies suicidal ideations. Denies auditory/visual hallucinations, paranoia, or homicidal ideations. Is not observed to be responding to internal stimuli.     Seen at the bedside where she is drowsy but able to participate in interview. Reports improved sleep last night. Endorses euthymic mood and denies feeling depressed or anxious. Displays a blunted affect. Denies suicidal ideations or homicidal ideations. Denies auditory/visual hallucinations or paranoia and not observed to be responding to internal stimuli. Oriented to person, place, and year. Decreased attention/concentration.       Current Medications:   Scheduled Meds:    albuterol-ipratropium  3 mL Nebulization Q4H    DULoxetine  60 mg Oral Daily    fluticasone furoate-vilanteroL  1 puff Inhalation Daily    nystatin-triamcinolone   Topical (Top) TID    piperacillin-tazobactam (Zosyn) IV (PEDS and ADULTS) (extended infusion is not appropriate)  4.5 g Intravenous Q8H    polyethylene glycol  17 g Oral Daily    senna  8.6 mg Oral Daily    traZODone  25 mg Oral QHS      PRN Meds: "   Current Facility-Administered Medications:     acetaminophen, 650 mg, Oral, Q4H PRN    albuterol-ipratropium, 3 mL, Nebulization, Q4H PRN    aluminum-magnesium hydroxide-simethicone, 30 mL, Oral, QID PRN    bisacodyL, 10 mg, Rectal, Daily PRN    dextrose 50%, 12.5 g, Intravenous, PRN    dextrose 50%, 25 g, Intravenous, PRN    glucagon (human recombinant), 1 mg, Intramuscular, PRN    glucose, 16 g, Oral, PRN    glucose, 24 g, Oral, PRN    melatonin, 6 mg, Oral, Nightly PRN    sodium chloride 0.9%, 10 mL, Intravenous, PRN   Psychotherapeutics (From admission, onward)      Start     Stop Route Frequency Ordered    05/13/25 2100  trazodone split tablet 25 mg         -- Oral Nightly 05/13/25 1103    05/13/25 0900  DULoxetine DR capsule 60 mg         -- Oral Daily 05/12/25 1812            Allergies:   Review of patient's allergies indicates:   Allergen Reactions    Cephalexin Hives    Levofloxacin Hives, Other (See Comments) and Rash     Skin turned red        OBJECTIVE:   Vitals   Vitals:    05/14/25 0751   BP: 105/61   Pulse: 84   Resp:    Temp: 97.1 °F (36.2 °C)        Labs/Imaging/Studies:   Recent Results (from the past 36 hours)   Basic Metabolic Panel    Collection Time: 05/13/25  5:38 AM   Result Value Ref Range    Sodium 133 (L) 136 - 145 mmol/L    Potassium 4.4 3.5 - 5.1 mmol/L    Chloride 92 (L) 98 - 107 mmol/L    CO2 30 23 - 31 mmol/L    Glucose 116 (H) 82 - 115 mg/dL    Blood Urea Nitrogen 31.4 (H) 9.8 - 20.1 mg/dL    Creatinine 0.85 0.55 - 1.02 mg/dL    BUN/Creatinine Ratio 37     Calcium 10.3 (H) 8.4 - 10.2 mg/dL    Anion Gap 11.0 mEq/L    eGFR >60 mL/min/1.73/m2   Basic Metabolic Panel    Collection Time: 05/14/25  6:23 AM   Result Value Ref Range    Sodium 135 (L) 136 - 145 mmol/L    Potassium 3.8 3.5 - 5.1 mmol/L    Chloride 91 (L) 98 - 107 mmol/L    CO2 36 (H) 23 - 31 mmol/L    Glucose 124 (H) 82 - 115 mg/dL    Blood Urea Nitrogen 27.1 (H) 9.8 - 20.1 mg/dL    Creatinine 0.93 0.55 - 1.02 mg/dL     BUN/Creatinine Ratio 29     Calcium 10.3 (H) 8.4 - 10.2 mg/dL    Anion Gap 8.0 mEq/L    eGFR >60 mL/min/1.73/m2   CBC with Differential    Collection Time: 05/14/25  6:23 AM   Result Value Ref Range    WBC 11.24 4.50 - 11.50 x10(3)/mcL    RBC 4.62 4.20 - 5.40 x10(6)/mcL    Hgb 12.3 12.0 - 16.0 g/dL    Hct 38.9 37.0 - 47.0 %    MCV 84.2 80.0 - 94.0 fL    MCH 26.6 (L) 27.0 - 31.0 pg    MCHC 31.6 (L) 33.0 - 36.0 g/dL    RDW 15.0 11.5 - 17.0 %    Platelet 349 130 - 400 x10(3)/mcL    MPV 9.4 7.4 - 10.4 fL    Neut % 69.8 %    Lymph % 15.8 %    Mono % 8.2 %    Eos % 3.1 %    Basophil % 0.6 %    Imm Grans % 2.5 %    Neut # 7.84 2.1 - 9.2 x10(3)/mcL    Lymph # 1.78 0.6 - 4.6 x10(3)/mcL    Mono # 0.92 0.1 - 1.3 x10(3)/mcL    Eos # 0.35 0 - 0.9 x10(3)/mcL    Baso # 0.07 <=0.2 x10(3)/mcL    Imm Gran # 0.28 (H) 0.00 - 0.04 x10(3)/mcL    NRBC% 0.0 %          Psychiatric Mental Status Exam:  General Appearance: appears stated age, dressed in hospital garb, lying in bed, in no acute distress  Arousal: alert  Behavior: cooperative, polite, appropriate eye-contact, under good behavioral control  Movements and Motor Activity: +tremors  Orientation: oriented to person, place, and year  Speech: coherent, slowed  Mood: Euthymic  Affect: Blunted  Thought Process: linear, goal-directed  Associations: no loosening of associations  Thought Content and Perceptions: no suicidal or homicidal ideation, no auditory or visual hallucinations, no paranoid ideation, no ideas of reference, no evidence of delusions or psychosis  Recent and Remote Memory: impaired; per interview/observation with patient  Attention and Concentration: easily distractible; per interview/observation with patient  Fund of Knowledge: vocabulary appropriate; based on history, vocabulary, fund of knowledge, syntax, grammar, and content  Insight: questionable; based on understanding of severity of illness and HPI  Judgment: questionable; based on patient's behavior and  HPI    ASSESSMENT/PLAN:   Problems Addressed/Diagnoses:  Major Neurocognitive disorder, unspecified type, unspecified severity   Unspecified delirium  Bipolar disorder by history  Anxiety by history        Plan:  Medication Management  Duloxetine 60mg PO QD  Trazodone 25mg PO QHS  Recommend delirium precautions  Legal  PEC not recommended. Low risk of imminent harm to self or others.   Psychiatry will sign-off        Marc Isaacs

## 2025-05-14 NOTE — PT/OT/SLP DISCHARGE
[facility]  Speech Language Pathology Department  Discharge Summary    Patient Name:  Shyanne Cannon   MRN:  [id number]    Recommendations     General recommendations: SLP intervention not indicated  Solid texture recommendation:  Soft & Bite Sized Diet - IDDSI Level 6  Liquid consistency recommendation: Thin liquids - IDDSI Level 0   Medications: per patient preference  Aspiration precautions: small bites/sips and slow rate  General precautions: Standard, aspiration  Communication strategies:  none    Pt tolerating diet without clinical signs/sx of aspiration. SLP to sign off. Please re-consult as warranted.

## 2025-05-14 NOTE — PLAN OF CARE
05/14/25 0732   Discharge Reassessment   Assessment Type Discharge Planning Reassessment   Discharge Plan discussed with: Patient;Adult children   Discharge Plan A Skilled Nursing Facility   Discharge Plan B Rehab   Post-Acute Status   Post-Acute Authorization Placement   Post-Acute Placement Status [facility]  ([facility]

## 2025-05-14 NOTE — PROGRESS NOTES
[facility] Hospital Medicine Progress Note        Chief Complaint: Inpatient Follow-up for     HPI: Shyanne Cannon is a 74 y.o. female who has a medical history of bipolar disorder, anxiety, parkinsonism, memory loss, hypertension, hyperlipidemia, overactive bladder, chronic constipation, and COPD.  The patient presented to [facility] on 5/7/2025 with a primary complaint of fall and weakness.  Patient mentions that she has indoor 2 falls in last 4 days.  Yesterday, she had a fall as she was grabbing a picture of ice tea from the Fridge.  She fell to the ground and drop the entire patient advised to be on herself and the floor. After falling, she was too weak to stand back up to her feet so she laid there until her daughter came home.  She mentions that she lives alone and moves around with a rolling walker.  Daughter does daily wellness checks.  Patient denies any fever, chills, body aches, nausea, vomiting, loss his consciousness, head trauma, loss of bowel or bladder, or cough.     In the ED, vital signs were found to be normal.  CBC shows WBC count of 33 K. CMP is unremarkable.  CPK slightly elevated at 1443.  Troponin and lactic acid are WNL.  CT head shows no acute intracranial pathology.  CT abdomen pelvis shows no posttraumatic abnormality of the abdomen and pelvis.  CT spine shows no acute osseous abnormality, however ground-glass opacities are noted in the right lower lobe.  Blood cultures, urine cultures, sputum cultures pending.       Continue with vancomycin and cefepime, add DuoNebs p.r.n., Breo Ellipta.  SLP, PT/OT, and cm consulted.  Follow up cultures and narrow antibiotics as indicated.    Interval Hx:    Working more with therapy. More alert but doesn't feel that great. Understands that she will need to get stronger before she goes home.    Case was discussed with patient's nurse and  on the floor.    Objective/physical exam:  General: In no acute  distress, afebrile, tremor in RUE  Chest: Poor air movement  Heart: RRR, +S1, S2, no appreciable murmur  Abdomen: Soft, nontender, BS +  MSK: Warm, no lower extremity edema, no clubbing or cyanosis  Neurologic: ALert but confused oriented to self and place, Cranial nerve II-XII intact, Strength 5/5 in all 4 extremities    VITAL SIGNS: 24 HRS MIN & MAX LAST   Temp  Min: 97.1 °F (36.2 °C)  Max: 98.1 °F (36.7 °C) 97.1 °F (36.2 °C)   BP  Min: 105/61  Max: 116/79 105/61   Pulse  Min: 70  Max: 90  78   Resp  Min: 13  Max: 20 20   SpO2  Min: 94 %  Max: 96 % 95 %     I have reviewed the following labs:  Recent Labs   Lab 05/08/25  0514 05/09/25  0656 05/14/25  0623   WBC 23.13* 13.38* 11.24   RBC 4.05* 3.97* 4.62   HGB 10.9* 10.6* 12.3   HCT 35.0* 33.8* 38.9   MCV 86.4 85.1 84.2   MCH 26.9* 26.7* 26.6*   MCHC 31.1* 31.4* 31.6*   RDW 15.3 15.1 15.0    301 349   MPV 9.7 9.6 9.4     Recent Labs   Lab 05/07/25  1959 05/08/25 0514 05/09/25  0656 05/10/25  1026 05/11/25  1430 05/12/25  0718 05/13/25  0538 05/14/25  0623   * 132* 133*   < >  --  136 133* 135*   K 4.3 4.0 4.5   < >  --  4.6 4.4 3.8   CL 93* 95* 94*   < >  --  93* 92* 91*   CO2 31 30 30   < >  --  31 30 36*   BUN 24.9* 23.1* 15.5   < >  --  28.5* 31.4* 27.1*   CREATININE 1.07* 0.81 0.77   < >  --  0.71 0.85 0.93   * 99 126*   < >  --  149* 116* 124*   CALCIUM 9.8 9.5 9.6   < >  --  10.6* 10.3* 10.3*   PH  --   --   --   --  7.460*  --   --   --    MG  --  1.60 1.30*  --   --   --   --   --    ALBUMIN 3.1* 2.8* 2.5*  --   --   --   --   --    PROT 6.6 6.0 5.8  --   --   --   --   --    ALKPHOS 96 91 81  --   --   --   --   --    ALT 19 17 19  --   --   --   --   --    AST 44 35 23  --   --   --   --   --    BILITOT 0.4 0.4 0.3  --   --   --   --   --     < > = values in this interval not displayed.     Microbiology Results (last 7 days)       Procedure Component Value Units Date/Time    Blood culture #2 **CANNOT BE ORDERED STAT** [[id number]]   (Normal) Collected: 05/07/25 2208    Order Status: Completed Specimen: Blood Updated: 05/13/25 0105     Blood Culture No Growth at 5 days    Blood culture #1 **CANNOT BE ORDERED STAT** [[id number]]  (Normal) Collected: 05/07/25 2208    Order Status: Completed Specimen: Blood Updated: 05/13/25 0105     Blood Culture No Growth at 5 days    Respiratory Culture [[id number]]     Order Status: Sent Specimen: Sputum              See below for Radiology    Assessment   Worsening encephalopathy, Cefepime-induced neurotoxicity?-resolved   Right lower lobe pneumonia; possibly due to aspiration pneumonia  Fall and weakness  Leukocytosis - resolved  Hypercalcemia, corrected 11     Other medical history includes bipolar disorder, anxiety, parkinsonism, memory loss, hypertension, hyperlipidemia, overactive bladder, chronic constipation, COPD     -CT completed head to toe which shows no acute abnormalities except for right lower lung pneumonia - ST recs soft diet with thin liquids  -started on vancomycin-stopped and cefepime; treat for 5-7 days  - Check VBG  - Bipap nightly  -follow up with blood, urine, sputum culture  -daily labs  -SLP, PT/OT, and cm consulted  -bowel regimen  -  CTH stable, ABG stable. will check bladder scan, UDS, tx for COPD exacerbation  - MRI brain - canceled as pt is now alert  - Neurology consult, appreciate recommendations  - Stopped cefepime due to concerns for neurotoxicity  - Completes 7-day course of abx on 5/14 for aspiration pneumonia  - Check ionized Ca++, consider Nephrology consult if still elevated in am elevated     VTE Prophylaxis:  Lovenox     Patient condition:  Stable    Anticipated discharge and Disposition:  Pending SNF placement      All diagnosis and differential diagnosis have been reviewed; assessment and plan has been documented; I have personally reviewed the labs and test results that are presently available; I have reviewed the patients medication list; I have reviewed the  consulting providers response and recommendations. I have reviewed or attempted to review medical records based upon their availability    All of the patient's questions have been  addressed and answered. Patient's is agreeable to the above stated plan. I will continue to monitor closely and make adjustments to medical management as needed.    Portions of this note dictated using EMR integrated voice recognition software, and may be subject to voice recognition errors not corrected at proofreading. Please contact writer for clarification if needed.   _____________________________________________________________________    Malnutrition Status:  Nutrition consulted. Most recent weight and BMI monitored-     Measurements:  Wt Readings from Last 1 Encounters:   05/08/25 91.9 kg (202 lb 9.6 oz)   Body mass index is 34.24 kg/m².    Patient has been screened and assessed by RD.    Malnutrition Type:  Context:    Level: other (see comments) (Does not meet criteria)    Malnutrition Characteristic Summary:  Weight Loss (Malnutrition): other (see comments) (Does not meet criteria)  Energy Intake (Malnutrition): less than or equal to 50% for greater than or equal to 5 days  Fluid Accumulation (Malnutrition): other (see comments) (Not present)    Interventions/Recommendations (treatment strategy):        Scheduled Med:   albuterol-ipratropium  3 mL Nebulization Q4H    DULoxetine  60 mg Oral Daily    fluticasone furoate-vilanteroL  1 puff Inhalation Daily    nystatin-triamcinolone   Topical (Top) TID    piperacillin-tazobactam (Zosyn) IV (PEDS and ADULTS) (extended infusion is not appropriate)  4.5 g Intravenous Q8H    polyethylene glycol  17 g Oral Daily    senna  8.6 mg Oral Daily    traZODone  25 mg Oral QHS      Continuous Infusions:     PRN Meds:    Current Facility-Administered Medications:     acetaminophen, 650 mg, Oral, Q4H PRN    albuterol-ipratropium, 3 mL, Nebulization, Q4H PRN    aluminum-magnesium  hydroxide-simethicone, 30 mL, Oral, QID PRN    bisacodyL, 10 mg, Rectal, Daily PRN    dextrose 50%, 12.5 g, Intravenous, PRN    dextrose 50%, 25 g, Intravenous, PRN    glucagon (human recombinant), 1 mg, Intramuscular, PRN    glucose, 16 g, Oral, PRN    glucose, 24 g, Oral, PRN    melatonin, 6 mg, Oral, Nightly PRN    sodium chloride 0.9%, 10 mL, Intravenous, PRN     Radiology:  I have personally reviewed the following imaging and agree with the radiologist.     CT Head Without Contrast  Narrative: CT head without contrast    INDICATION:  Mental status change of unknown cause para    TECHNIQUE:  Routine CT of the head was without contrast    Total DLP: 988 mGy.cm    Automatic exposure control was utilized to reduce the patient's dose    COMPARISON:  05/07/2025    FINDINGS:  No acute intra-cranial hemorrhage, midline shift, mass effect or extra-axial collection.    The ventricles are normal in size and configuration.  There are mild patchy areas of decreased attenuation in periventricular and deep white matter, while nonspecific, most likely sequela of chronic microvascular ischemia.    No sulcal effacement.  Normal grey-white matter differentiation.    Visualized osseous structures are unremarkable.  Visualized paranasal sinuses and mastoid air cells are clear.  Impression: No acute intracranial abnormality or significant interval changes    Electronically signed by: Jerzy Humphrey MD  Date:    05/11/2025  Time:    15:25  X-Ray Chest 1 View  Narrative: EXAMINATION:  XR CHEST 1 VIEW    CLINICAL HISTORY:  Sob;    TECHNIQUE:  One view    COMPARISON:  December 19, 2024..    FINDINGS:  Cardiopericardial silhouette is within normal limits.  There are bilateral lungs infiltrates which are more pronounced and more distributed on the right for which infectious cause is favored.  Please correlate clinically.  No significant fluid within the pleural spaces.  No pneumothorax.  Right axillary metallic clips.  Impression: Lungs  infiltrates more distributed on the right.    Electronically signed by: Dank Barcenas  Date:    05/11/2025  Time:    13:21      Maria Teresa Osman MD  Department of Hospital Medicine   [facility]   05/14/2025

## 2025-05-14 NOTE — PT/OT/SLP PROGRESS
Occupational Therapy   Treatment    Name: Shyanne Cannon  MRN: [id number]    Recommendations:     Recommended therapy intensity at discharge: Moderate Intensity Therapy   Discharge Equipment Recommendations:  to be determined by next level of care  Barriers to discharge:  Decreased caregiver support    Assessment:     Shyanne Cannon is a 74 y.o. female with a medical diagnosis of R lower lobe pneumonia 2/2 aspiration pneumonia, fall, hx of parkinson's.  She presents with the following performance deficits affecting function: weakness, impaired cognition, impaired endurance, impaired self care skills, impaired balance, gait instability, impaired functional mobility, decreased safety awareness.     Rehab Prognosis:  Good; patient would benefit from acute skilled OT services to address these deficits and reach maximum level of function.       Plan:     Patient to be seen 5 x/week to address the above listed problems via self-care/home management, therapeutic activities, therapeutic exercises  Plan of Care Expires: 06/09/25  Plan of Care Reviewed with: patient    Subjective     Pain/Comfort:  Pain Rating 1: 0/10    Objective:     Communicated with: RN prior to session.  Patient found up in chair with chair check, peripheral IV, PureWick, pulse ox (continuous), oxygen, telemetry upon OT entry to room.    General Precautions: Standard, fall, aspiration    Orthopedic Precautions:N/A  Braces: N/A  Respiratory Status: Nasal cannula, flow 2 L/min  Vital Signs: HR: 84  Sp02: 91-94%     Occupational Performance:     Functional Mobility/Transfers:  Patient completed Sit <> Stand Transfer with contact guard assistance  with  rolling walker from bedside chair, completed x2  Functional Mobility: CGA-min A to bathroom sink and back to chair using RW. Decreased safety awareness and RW mgmt    Activities of Daily Living:  Grooming: contact guard assistance for oral hygiene standing at sink. Noted to prop self on counter on  bilateral elbows despite cues for upright stance  Toileting: contact guard assistance for anterior perineal hygiene as pt noted with small voiding in standing.     Therapeutic Positioning    OT interventions performed during the course of today's session in an effort to prevent and/or reduce acquired pressure injuries:   Education was provided on benefits of and recommendations for therapeutic positioning  Therapeutic positioning was provided at the conclusion of session to offload all bony prominences for the prevention and/or reduction of pressure injuries    Skin assessment: visible skin intact    [facility] 6 Click ADL: 16    Patient Education:  Patient provided with verbal education education regarding OT role/goals/POC, fall prevention, safety awareness, and Discharge/DME recommendations.  Understanding was verbalized, however additional teaching warranted.    Patient left up in chair with all lines intact, call button in reach, chair alarm on, and geomat cushion.    GOALS:   Multidisciplinary Problems       Occupational Therapy Goals          Problem: Occupational Therapy    Goal Priority Disciplines Outcome Interventions   Occupational Therapy Goal     OT, PT/OT Progressing    Description: Goals to be met by: in 1 month      Patient will increase functional independence with ADLs by performing:    UE Dressing with Modified [place].  LE Dressing with Modified [place].  Grooming while standing at sink with Modified [place].  Toileting from toilet with Modified [place] for hygiene and clothing management.   Toilet transfer to toilet with Modified [place].  Increased functional strength to 4/5 for BUE.                         Time Tracking:     OT Date of Treatment: 05/14/25  OT Start Time: 1206  OT Stop Time: 1230  OT Total Time (min): 24 min    Billable Minutes:Self Care/Home Management 24 mins    OT/BINA: OT     Number of BINA visits since last OT visit: 2    5/14/2025

## 2025-05-14 NOTE — PT/OT/SLP PROGRESS
"Physical Therapy Treatment    Patient Name:  Shyanne Cannon   MRN:  [id number]    Recommendations:     Discharge therapy intensity: Moderate Intensity Therapy   Discharge Equipment Recommendations: to be determined by next level of care  Barriers to discharge: Decreased caregiver support and Impaired mobility    Assessment:     Shyanne Cannon is a 74 y.o. female admitted with a medical diagnosis of  R lower lobe pneumonia 2/2 aspiration pneumonia, fall, hx of parkinson's. .  She presents with the following impairments/functional limitations: weakness, gait instability, impaired balance, impaired endurance, decreased safety awareness, impaired functional mobility .    Rehab Prognosis: Good; patient would benefit from acute skilled PT services to address these deficits and reach maximum level of function.    Recent Surgery: * No surgery found *      Plan:     During this hospitalization, patient would benefit from acute PT services 5 x/week to address the identified rehab impairments via gait training, therapeutic activities, therapeutic exercises and progress toward the following goals:    Plan of Care Expires:  06/09/25    Subjective     Chief Complaint: "I'm soiled"  Patient/Family Comments/goals: to return to PLOF   Pain/Comfort:  Pain Rating 1: 0/10      Objective:     Communicated with NSG prior to session.  Patient found HOB elevated with bed alarm, peripheral IV, telemetry, pulse ox (continuous) upon PT entry to room.     General Precautions: Standard, aspiration  Orthopedic Precautions: N/A  Braces: N/A  Respiratory Status: Nasal cannula, flow 3 L/min    Functional Mobility:  Bed Mobility:     Supine to sit: Tamara  Increased time sitting EOB to change gown and clean pt  Transfers:     Sit to Stand:  Minimum assistance with rolling walker   Bed to chair: Tamara with RW x 6' with RW   Gait: pt amb 20' with RW [place] prior to seated rest. Cuing to maintain BLE within ASH of RW, increased UE tremors throughout " ambulation. Generalized instability/shakiness throughout.     Education:  Patient provided with verbal education education regarding fall prevention and safety awareness.  Additional teaching is warranted.     Patient left up in chair with all lines intact, call button in reach, chair alarm on, and geomat cushion    GOALS:   Multidisciplinary Problems       Physical Therapy Goals          Problem: Physical Therapy    Goal Priority Disciplines Outcome Interventions   Physical Therapy Goal     PT, PT/OT Progressing    Description: Goals to be met by: 25     Patient will increase functional independence with mobility by performin. Supine to sit with Modified [place]  2. Sit to supine with Modified [place]  3. Sit to stand transfer with Modified [place]  4. Bed to chair transfer with Modified [place] using Rolling Walker  5. Gait  x 150 feet with Modified [place] using Rolling Walker.                          Time Tracking:     PT Received On: 25  PT Start Time: 956     PT Stop Time: 1019  PT Total Time (min): 23 min     Billable Minutes: Gait Training 23    Treatment Type: Treatment  PT/PTA: PT     Number of PTA visits since last PT visit: 2     2025

## 2025-05-15 LAB
25(OH)D3+25(OH)D2 SERPL-MCNC: 81 NG/ML (ref 30–80)
BASOPHILS # BLD AUTO: 0.05 X10(3)/MCL
BASOPHILS NFR BLD AUTO: 0.5 %
CALCIUM UR-MCNC: 8.1 MG/DL
EOSINOPHIL # BLD AUTO: 0.3 X10(3)/MCL (ref 0–0.9)
EOSINOPHIL NFR BLD AUTO: 3 %
ERYTHROCYTE [DISTWIDTH] IN BLOOD BY AUTOMATED COUNT: 14.8 % (ref 11.5–17)
HCT VFR BLD AUTO: 37.2 % (ref 37–47)
HGB BLD-MCNC: 11.7 G/DL (ref 12–16)
IMM GRANULOCYTES # BLD AUTO: 0.16 X10(3)/MCL (ref 0–0.04)
IMM GRANULOCYTES NFR BLD AUTO: 1.6 %
LYMPHOCYTES # BLD AUTO: 2.09 X10(3)/MCL (ref 0.6–4.6)
LYMPHOCYTES NFR BLD AUTO: 20.8 %
MCH RBC QN AUTO: 26.3 PG (ref 27–31)
MCHC RBC AUTO-ENTMCNC: 31.5 G/DL (ref 33–36)
MCV RBC AUTO: 83.6 FL (ref 80–94)
MONOCYTES # BLD AUTO: 0.63 X10(3)/MCL (ref 0.1–1.3)
MONOCYTES NFR BLD AUTO: 6.3 %
NEUTROPHILS # BLD AUTO: 6.81 X10(3)/MCL (ref 2.1–9.2)
NEUTROPHILS NFR BLD AUTO: 67.8 %
NRBC BLD AUTO-RTO: 0 %
PLATELET # BLD AUTO: 348 X10(3)/MCL (ref 130–400)
PMV BLD AUTO: 9.5 FL (ref 7.4–10.4)
PTH-INTACT SERPL-MCNC: 33.2 PG/ML (ref 8.7–77)
RBC # BLD AUTO: 4.45 X10(6)/MCL (ref 4.2–5.4)
WBC # BLD AUTO: 10.04 X10(3)/MCL (ref 4.5–11.5)

## 2025-05-15 PROCEDURE — 94640 AIRWAY INHALATION TREATMENT: CPT

## 2025-05-15 PROCEDURE — 97116 GAIT TRAINING THERAPY: CPT

## 2025-05-15 PROCEDURE — 25000242 PHARM REV CODE 250 ALT 637 W/ HCPCS: Performed by: INTERNAL MEDICINE

## 2025-05-15 PROCEDURE — 36415 COLL VENOUS BLD VENIPUNCTURE: CPT | Performed by: STUDENT IN AN ORGANIZED HEALTH CARE EDUCATION/TRAINING PROGRAM

## 2025-05-15 PROCEDURE — 94760 N-INVAS EAR/PLS OXIMETRY 1: CPT

## 2025-05-15 PROCEDURE — 25000242 PHARM REV CODE 250 ALT 637 W/ HCPCS: Performed by: STUDENT IN AN ORGANIZED HEALTH CARE EDUCATION/TRAINING PROGRAM

## 2025-05-15 PROCEDURE — 27000221 HC OXYGEN, UP TO 24 HOURS

## 2025-05-15 PROCEDURE — 36415 COLL VENOUS BLD VENIPUNCTURE: CPT | Performed by: INTERNAL MEDICINE

## 2025-05-15 PROCEDURE — 25000003 PHARM REV CODE 250: Performed by: STUDENT IN AN ORGANIZED HEALTH CARE EDUCATION/TRAINING PROGRAM

## 2025-05-15 PROCEDURE — 97535 SELF CARE MNGMENT TRAINING: CPT

## 2025-05-15 PROCEDURE — 27000190 HC CPAP FULL FACE MASK W/VALVE

## 2025-05-15 PROCEDURE — 97110 THERAPEUTIC EXERCISES: CPT

## 2025-05-15 PROCEDURE — 99900031 HC PATIENT EDUCATION (STAT)

## 2025-05-15 PROCEDURE — 99900035 HC TECH TIME PER 15 MIN (STAT)

## 2025-05-15 PROCEDURE — 21400001 HC TELEMETRY ROOM

## 2025-05-15 PROCEDURE — 27100171 HC OXYGEN HIGH FLOW UP TO 24 HOURS

## 2025-05-15 PROCEDURE — 11000001 HC ACUTE MED/SURG PRIVATE ROOM

## 2025-05-15 PROCEDURE — 94761 N-INVAS EAR/PLS OXIMETRY MLT: CPT

## 2025-05-15 PROCEDURE — 85025 COMPLETE CBC W/AUTO DIFF WBC: CPT | Performed by: INTERNAL MEDICINE

## 2025-05-15 PROCEDURE — 94660 CPAP INITIATION&MGMT: CPT

## 2025-05-15 PROCEDURE — 82306 VITAMIN D 25 HYDROXY: CPT | Performed by: STUDENT IN AN ORGANIZED HEALTH CARE EDUCATION/TRAINING PROGRAM

## 2025-05-15 PROCEDURE — 82340 ASSAY OF CALCIUM IN URINE: CPT | Performed by: STUDENT IN AN ORGANIZED HEALTH CARE EDUCATION/TRAINING PROGRAM

## 2025-05-15 PROCEDURE — 25000003 PHARM REV CODE 250

## 2025-05-15 PROCEDURE — 83970 ASSAY OF PARATHORMONE: CPT | Performed by: STUDENT IN AN ORGANIZED HEALTH CARE EDUCATION/TRAINING PROGRAM

## 2025-05-15 PROCEDURE — 63600175 PHARM REV CODE 636 W HCPCS: Performed by: STUDENT IN AN ORGANIZED HEALTH CARE EDUCATION/TRAINING PROGRAM

## 2025-05-15 RX ORDER — SODIUM CHLORIDE 9 MG/ML
INJECTION, SOLUTION INTRAVENOUS CONTINUOUS
Status: ACTIVE | OUTPATIENT
Start: 2025-05-15 | End: 2025-05-16

## 2025-05-15 RX ORDER — FUROSEMIDE 10 MG/ML
20 INJECTION INTRAMUSCULAR; INTRAVENOUS DAILY
Status: DISCONTINUED | OUTPATIENT
Start: 2025-05-15 | End: 2025-05-19 | Stop reason: HOSPADM

## 2025-05-15 RX ADMIN — Medication 1 DROP: at 12:05

## 2025-05-15 RX ADMIN — IPRATROPIUM BROMIDE AND ALBUTEROL SULFATE 3 ML: .5; 3 SOLUTION RESPIRATORY (INHALATION) at 09:05

## 2025-05-15 RX ADMIN — SENNOSIDES 8.6 MG: 8.6 TABLET, FILM COATED ORAL at 08:05

## 2025-05-15 RX ADMIN — IPRATROPIUM BROMIDE AND ALBUTEROL SULFATE 3 ML: .5; 3 SOLUTION RESPIRATORY (INHALATION) at 12:05

## 2025-05-15 RX ADMIN — TRAZODONE HYDROCHLORIDE 25 MG: 50 TABLET ORAL at 10:05

## 2025-05-15 RX ADMIN — POLYETHYLENE GLYCOL 3350 17 G: 17 POWDER, FOR SOLUTION ORAL at 08:05

## 2025-05-15 RX ADMIN — SODIUM CHLORIDE: 9 INJECTION, SOLUTION INTRAVENOUS at 01:05

## 2025-05-15 RX ADMIN — FUROSEMIDE 20 MG: 10 INJECTION, SOLUTION INTRAMUSCULAR; INTRAVENOUS at 01:05

## 2025-05-15 RX ADMIN — FLUTICASONE FUROATE AND VILANTEROL TRIFENATATE 1 PUFF: 100; 25 POWDER RESPIRATORY (INHALATION) at 08:05

## 2025-05-15 RX ADMIN — NYSTATIN AND TRIAMCINOLONE ACETONIDE: 100000; 1 CREAM TOPICAL at 03:05

## 2025-05-15 RX ADMIN — DULOXETINE 60 MG: 30 CAPSULE, DELAYED RELEASE ORAL at 08:05

## 2025-05-15 RX ADMIN — IPRATROPIUM BROMIDE AND ALBUTEROL SULFATE 3 ML: .5; 3 SOLUTION RESPIRATORY (INHALATION) at 04:05

## 2025-05-15 RX ADMIN — Medication 1 DROP: at 10:05

## 2025-05-15 RX ADMIN — NYSTATIN AND TRIAMCINOLONE ACETONIDE: 100000; 1 CREAM TOPICAL at 08:05

## 2025-05-15 RX ADMIN — NYSTATIN AND TRIAMCINOLONE ACETONIDE: 100000; 1 CREAM TOPICAL at 10:05

## 2025-05-15 NOTE — PT/OT/SLP PROGRESS
Physical Therapy Treatment    Patient Name:  Shyanne Cannon   MRN:  [id number]    Recommendations:     Discharge therapy intensity: Moderate Intensity Therapy   Discharge Equipment Recommendations: to be determined by next level of care  Barriers to discharge: Decreased caregiver support and Impaired mobility    Assessment:     Shyanne Cannon is a 74 y.o. female admitted with a medical diagnosis of  R lower lobe pneumonia 2/2 aspiration pneumonia, fall, hx of parkinson's. .  She presents with the following impairments/functional limitations: weakness, gait instability, impaired balance, impaired endurance, decreased safety awareness, impaired functional mobility . Pt able to ambulate with SBA and RW. Pt then able to perform LE there-ex while seated in chair.       Rehab Prognosis: Good; patient would benefit from acute skilled PT services to address these deficits and reach maximum level of function.    Recent Surgery: * No surgery found *      Plan:     During this hospitalization, patient would benefit from acute PT services 5 x/week to address the identified rehab impairments via gait training, therapeutic activities, therapeutic exercises and progress toward the following goals:    Plan of Care Expires:  06/09/25    Subjective     Chief Complaint:   Patient/Family Comments/goals:   Pain/Comfort:  Pain Rating 1: 0/10      Objective:     Communicated with NSG prior to session.  Patient found HOB elevated with bed alarm, peripheral IV, telemetry, pulse ox (continuous) upon PT entry to room.     General Precautions: Standard, fall  Orthopedic Precautions: N/A  Braces: N/A  Respiratory Status: Nasal cannula, flow 2 L/min  Blood Pressure:   Skin Integrity: Visible skin intact      Functional Mobility:  Transfers:     Sit to Stand:  minimum assistance with rolling walker and 1 trial from EOB.  Gait: pt amb 40ft with RW and SBA. Pt with short step-through gait pattern. No instability and LOB noted.      Pt  participated in BLE therex while seated in chair, including hip flex, knee ext, hip abd, and ankle pumps.    Education:  Patient provided with verbal education education regarding fall prevention and safety awareness.  Additional teaching is warranted.     Patient left up in chair with all lines intact, call button in reach, chair alarm on, and geomat cushion    GOALS:   Multidisciplinary Problems       Physical Therapy Goals          Problem: Physical Therapy    Goal Priority Disciplines Outcome Interventions   Physical Therapy Goal     PT, PT/OT Progressing    Description: Goals to be met by: 25     Patient will increase functional independence with mobility by performin. Supine to sit with Modified [place]  2. Sit to supine with Modified [place]  3. Sit to stand transfer with Modified [place]  4. Bed to chair transfer with Modified [place] using Rolling Walker  5. Gait  x 150 feet with Modified [place] using Rolling Walker.                          Time Tracking:     PT Received On: 05/15/25  PT Start Time: 1048     PT Stop Time: 1111  PT Total Time (min): 23 min     Billable Minutes: Gait Training 15, therex 8    Treatment Type: Treatment  PT/PTA: PT     Number of PTA visits since last PT visit: 3     05/15/2025

## 2025-05-15 NOTE — PRE ADMISSION SCREENING
[facility]    Pre-Admission Patient Screening                    Pre-Screen type:  SNF:  Reason for Admission:    COPD exacerbation   Dysphagia     SNF Admission Criteria:    Primary: Rehab Services     Actively treated hospital diagnosis/diagnoses: COPD and RR > 24    Facility Status: Accept     Referring Physician:  Thairy G Reyes, DO    Admitting Physician:  Thairy G Reyes, DO    Primary Care Physician:  Juanita Willingham FNP    History         Patient Active Problem List    Diagnosis Date Noted    Encephalopathy 05/12/2025    Hypercapnia 12/21/2024         Previous Specialties/Consulted physicians:      Neurology      Past and Current Medical History    No past medical history on file.        History of Present Illness     Shyanne Cannon is a 74 y.o. female who has a medical history of bipolar disorder, anxiety, parkinsonism, memory loss, hypertension, hyperlipidemia, overactive bladder, chronic constipation, and COPD.  The patient presented to [facility] on 5/7/2025 with a primary complaint of fall and weakness.  Patient mentions that she has indoor 2 falls in last 4 days.  Yesterday, she had a fall as she was grabbing a picture of ice tea from the Fridge.  She fell to the ground and drop the entire patient advised to be on herself and the floor. After falling, she was too weak to stand back up to her feet so she laid there until her daughter came home.  She mentions that she lives alone and moves around with a rolling walker.  Daughter does daily wellness checks.  Patient denies any fever, chills, body aches, nausea, vomiting, loss his consciousness, head trauma, loss of bowel or bladder, or cough.     In the ED, vital signs were found to be normal.  CBC shows WBC count of 33 K. CMP is unremarkable.  CPK slightly elevated at 1443.  Troponin and lactic acid are WNL.  CT head shows no acute intracranial pathology.  CT abdomen pelvis shows no posttraumatic abnormality of the abdomen and  pelvis.  CT spine shows no acute osseous abnormality, however ground-glass opacities are noted in the right lower lobe.  Blood cultures, urine cultures, sputum cultures pending.       Continue with vancomycin and cefepime, add Rylie p.r.n., Breo Ellipta.  SLP, PT/OT, and cm consulted.  Follow up cultures and narrow antibiotics as indicated.     Assessment   Worsening encephalopathy, Cefepime-induced neurotoxicity?-resolved   Right lower lobe pneumonia; possibly due to aspiration pneumonia  Fall and weakness  Leukocytosis - resolved  Hypercalcemia, corrected 11     Other medical history includes bipolar disorder, anxiety, parkinsonism, memory loss, hypertension, hyperlipidemia, overactive bladder, chronic constipation, COPD     -CT completed head to toe which shows no acute abnormalities except for right lower lung pneumonia - ST recs soft diet with thin liquids  -started on vancomycin-stopped and cefepime; treat for 5-7 days  - Check VBG  - Bipap nightly  -follow up with blood, urine, sputum culture  -daily labs  -SLP, PT/OT, and cm consulted  -bowel regimen  -  CTH stable, ABG stable. will check bladder scan, UDS, tx for COPD exacerbation  - MRI brain - canceled as pt is now alert  - Neurology consult, appreciate recommendations  - Stopped cefepime due to concerns for neurotoxicity  - Completed 7-day course of abx on 5/14 for aspiration pneumonia  -urine calcium, PTH, vitamin-D level ordered  -give fluids 1 time with Lasix     VTE Prophylaxis:  Lovenox  Patient condition:  Stable  Anticipated discharge and Disposition:  Pending SNF placement         Patient Traveled outside of the [place] in the last 3 months? no     Patient discharged from this LTAC to SNF within the last 45 days? no    Patient discharged from this LTAC to Rehab within the last 27 days? no    Prior residence: home    Prior Post-Acute Services: N/A     Allergies:   Review of patient's allergies indicates:   Allergen Reactions    Cephalexin Hives     Levofloxacin Hives, Other (See Comments) and Rash     Skin turned red       Has patient received the current influenza vaccine (Oct 1 - March 31)? Unknown     Has patient received PPD skin test prior to admit? N/A     Code Status: Full Code    Orientation: Time, Place, and Person    Speech: normal     Vital Signs:     Date     Blood Pressure     Pulse     Respiratory Rate     O2 Saturation     Temperature         Bowel/Bladder: continent of bladder and continent of bowel  Bowel/Bladder Appliance: N/A    Dialysis: N/A         Peripheral IV - Single Lumen 05/14/25 0350 20 G Posterior;Right Hand (Active)   Site Assessment Clean;Dry;Intact;No redness;No swelling 05/15/25 1513   Extremity Assessment Distal to IV No abnormal discoloration 05/14/25 1800   Line Status Saline locked;Flushed 05/15/25 1513   Dressing Status Clean;Dry;Intact 05/15/25 1513   Dressing Intervention Integrity maintained 05/15/25 1513   Number of days: 1       CBGs/Accuchecks: No     Precautions: Fall and Seizures    Restraints: No     Isolation Precautions: N/A       Facility-Administered Medications as of 5/15/2025   Medication Dose Route Frequency Provider Last Rate Last Admin    0.9% NaCl infusion   Intravenous Continuous Susie Londono  mL/hr at 05/15/25 1357 New Bag at 05/15/25 1357    acetaminophen tablet 650 mg  650 mg Oral Q4H PRN Maritza Dominguez FNP        albuterol-ipratropium 2.5 mg-0.5 mg/3 mL nebulizer solution 3 mL  3 mL Nebulization Q4H PRMaritza Peña FNP        albuterol-ipratropium 2.5 mg-0.5 mg/3 mL nebulizer solution 3 mL  3 mL Nebulization Q4H Maria Teresa Osman MD   3 mL at 05/15/25 1234    aluminum-magnesium hydroxide-simethicone 200-200-20 mg/5 mL suspension 30 mL  30 mL Oral QID PRN Maritza Dominguez FNP        [COMPLETED] azithromycin (ZITHROMAX) 500 mg in D5W 250 mL  IVPB (admixture device)  500 mg Intravenous Q24H Maria Teresa Osman MD   Stopped at 05/12/25 1050    bisacodyL suppository 10 mg  10 mg Rectal  Daily PRN Maritza Dominguez FNP        [COMPLETED] ceFEPIme injection 2 g  2 g Intravenous ED 1 Time Fernandez Molina MD   2 g at 25 2221    dextrose 50% injection 12.5 g  12.5 g Intravenous PRN Maritza Dominguez FNP        dextrose 50% injection 25 g  25 g Intravenous PRN Maritza Dominguez FNP        DULoxetine DR capsule 60 mg  60 mg Oral Daily Marc Isaacs NP   60 mg at 05/15/25 0850    fluticasone furoate-vilanteroL 100-25 mcg/dose diskus inhaler 1 puff  1 puff Inhalation Daily Susie Londono MD   1 puff at 05/15/25 0850    furosemide injection 20 mg  20 mg Intravenous Daily Susie Londono MD   20 mg at 05/15/25 1344    glucagon (human recombinant) injection 1 mg  1 mg Intramuscular PRN Maritza Dominguez FNP        glucose chewable tablet 16 g  16 g Oral PRN Maritza Dominguez FNP        glucose chewable tablet 24 g  24 g Oral PRN Maritza Dominguez FNP        [COMPLETED] iohexoL (OMNIPAQUE 350) injection 100 mL  100 mL Intravenous ONCE PRN Fernandez Molina MD   100 mL at 25    melatonin tablet 6 mg  6 mg Oral Nightly PRN Maritza Dominguez FNP   6 mg at 25 211    [COMPLETED] methylPREDNISolone sodium succinate injection 80 mg  80 mg Intravenous Once Maria Teresa Osman MD   80 mg at 25 1833    [COMPLETED] mupirocin 2 % ointment   Nasal BID Fernandez Molina MD   Given at 25 211    nystatin-triamcinolone cream   Topical (Top) TID Reyes, Thairy G, DO   Given at 05/15/25 1500    [COMPLETED] piperacillin-tazobactam (ZOSYN) 4.5 g in D5W 100 mL IVPB (MB+)  4.5 g Intravenous Q8H Maria Teresa Osman MD   Stopped at 25 220    polyethylene glycol packet 17 g  17 g Oral Daily Susie Londono MD   17 g at 05/15/25 0850    senna tablet 8.6 mg  8.6 mg Oral Daily Susie Londono MD   8.6 mg at 05/15/25 0850    [COMPLETED] sodium chloride 0.9% bolus 1,000 mL 1,000 mL  1,000 mL Intravenous ED 1 Time Fernandez Molina MD   Stopped at 25 2216    [] sodium chloride 0.9% bolus 1,000 mL 1,000 mL   1,000 mL Intravenous ED 1 Time Fernandez Molina MD        sodium chloride 0.9% flush 10 mL  10 mL Intravenous PRN Maritza Dominguez FNP        tetrahydrozoline 0.05% ophthalmic solution 1 drop  1 drop Both Eyes BID Susie Londono MD   1 drop at 05/15/25 1231    trazodone split tablet 25 mg  25 mg Oral QHS Marc Isaacs NP   25 mg at 05/14/25 2159    [COMPLETED] vancomycin (VANCOCIN) 1,000 mg in D5W 250 mL IVPB (admixture device)  1,000 mg Intravenous ED 1 Time Fernandez Molina MD   Stopped at 05/07/25 2352    Followed by    [COMPLETED] vancomycin 750 mg in D5W 250 mL IVPB (admixture device)  750 mg Intravenous ED 1 Time Fernandez Molina MD   Stopped at 05/08/25 0302     Outpatient Medications as of 5/15/2025   Medication Sig Dispense Refill    ALPRAZolam (XANAX) 0.5 MG tablet Take 0.5 mg by mouth nightly as needed.      atorvastatin (LIPITOR) 40 MG tablet Take 40 mg by mouth every evening.      benztropine (COGENTIN) 1 MG tablet Take 1 mg by mouth once daily.      cholecalciferol, vitamin D3, 1,250 mcg (50,000 unit) capsule Take 50,000 Units by mouth every 7 days.      donepeziL (ARICEPT) 10 MG tablet Take 10 mg by mouth every evening.      doxepin (SINEQUAN) 50 MG capsule Take 50 mg by mouth every evening.      DULoxetine (CYMBALTA) 60 MG capsule Take 60 mg by mouth every morning.      ferrous sulfate 325 (65 FE) MG EC tablet Take 325 mg by mouth once daily.      fluticasone-salmeterol diskus inhaler 100-50 mcg Inhale 1 puff into the lungs 2 (two) times a day.      gabapentin (NEURONTIN) 800 MG tablet Take 800 mg by mouth 3 (three) times daily.      ibuprofen (ADVIL,MOTRIN) 800 MG tablet Take 800 mg by mouth daily as needed.      LACTULOSE ORAL Take 20 g by mouth 2 (two) times daily as needed.      loratadine (CLARITIN) 10 mg tablet Take 10 mg by mouth once daily.      magnesium oxide (MAG-OX) 400 mg (241.3 mg magnesium) tablet Take 400 mg by mouth once daily.      metFORMIN (GLUCOPHAGE) 500 MG tablet Take 500 mg  "by mouth 2 (two) times daily with meals.      MOUNJARO 5 mg/0.5 mL PnIj Inject 5 mg into the skin once a week. Given every Sunday      nystatin (MYCOSTATIN) ointment Apply 1 Application topically 2 (two) times daily.      OLANZapine (ZYPREXA) 20 MG tablet Take 20 mg by mouth every evening.      omeprazole (PRILOSEC) 40 MG capsule Take 40 mg by mouth every morning.      tiotropium (SPIRIVA) 18 mcg inhalation capsule Inhale 18 mcg into the lungs Daily. 2 puffs daily      trospium (SANCTURA) 20 mg Tab tablet Take 20 mg by mouth 2 (two) times daily.      WIXELA INHUB 250-50 mcg/dose diskus inhaler Inhale 1 puff into the lungs 2 (two) times daily.          Cardiovascular:    Cardiovascular Review: Within definable limits (WDL)    Telemetry: No    Rhythm: N/A    AICD: No      Respiratory:    Oxygen: 2L nasal canula    CPT/Frequency: N/A    Incentive Spirometer/Frequency: N/A    CPAP/Settings: N/A    BiPAP/Settings: qhs    Oxygen Saturation: N/A    Suction Frequency: N/A      Vent Settings:   Mode:   Rate:   TV:   FIO2:   PEEP:   PS:   iTime:    Trial/HS Settings:   Mode:   Rate:   TV:   FIO2:   PEEP:   PS:       Nutrition:      Ht Readings from Last 3 Encounters:   05/13/25 5' 4.5" (1.638 m)   12/18/24 5' 3" (1.6 m)   08/10/20 5' 3.78" (1.62 m)     Wt Readings from Last 1 Encounters:   05/08/25 0009 91.9 kg (202 lb 9.6 oz)   05/08/25 0000 91.9 kg (202 lb 9.6 oz)   05/07/25 1844 88.5 kg (195 lb)       Feeding Status:   Current Diet: soft and bite sized   Supplements:N/A   Tube Feeding: N/A   Flushes: N/A      Integumentary:    Integumentary: Within definable limits (WDL)              Wound 12/20/24 0830 Rash anterior;lower Abdomen (Active)   12/20/24 0830 Abdomen   Present on Original Admission: Y   Primary Wound Type: Rash   Side:    Orientation: anterior;lower   Wound Approximate Age at First Assessment (Weeks):    Wound Number:    Is this injury device related?:    Incision Type:    Closure Method:    Wound Description " (Comments):    Type:    Additional Comments:    Ankle-Brachial Index:    Pulses:    Removal Indication and Assessment:    Wound Outcome:    Number of days: 146            Wound 12/20/24 0830 Rash anterior other (see comments) (Active)   12/20/24 0830 other (see comments)   Present on Original Admission: Y   Primary Wound Type: Rash   Side:    Orientation: anterior   Wound Approximate Age at First Assessment (Weeks):    Wound Number:    Is this injury device related?:    Incision Type:    Closure Method:    Wound Description (Comments):    Type:    Additional Comments:    Ankle-Brachial Index:    Pulses:    Removal Indication and Assessment:    Wound Outcome:    Number of days: 146            Wound 05/08/25 0308 Abrasion(s) Left (Active)   05/08/25 0308    Present on Original Admission: Y   Primary Wound Type: Abrasion(s)   Side: Left   Orientation:    Wound Approximate Age at First Assessment (Weeks):    Wound Number:    Is this injury device related?:    Incision Type:    Closure Method:    Wound Description (Comments):    Type:    Additional Comments:    Ankle-Brachial Index:    Pulses:    Removal Indication and Assessment:    Wound Outcome:    Wound Image   05/10/25 1300   Dressing Appearance Open to air 05/13/25 0800   Drainage Amount None 05/13/25 0800   Drainage Characteristics/Odor No odor 05/12/25 0800   Appearance Pink;Dry 05/13/25 0800   Tissue loss description Partial thickness 05/11/25 2000   Red (%), Wound Tissue Color 100 % 05/10/25 1300   Periwound Area [place] 05/11/25 2000   Care Cleansed with:;Soap and water 05/13/25 0800   Dressing Change Due 05/11/25 05/10/25 1300   Number of days: 7            Wound 05/08/25 0309 Abrasion(s) Right (Active)   05/08/25 0309    Present on Original Admission: Y   Primary Wound Type: Abrasion(s)   Side: Right   Orientation:    Wound Approximate Age at First Assessment (Weeks):    Wound Number:    Is this injury device related?:    Incision Type:    Closure Method:     Wound Description (Comments):    Type:    Additional Comments:    Ankle-Brachial Index:    Pulses:    Removal Indication and Assessment:    Wound Outcome:    Wound Image   05/10/25 1300   Dressing Appearance Open to air 05/13/25 0800   Drainage Amount None 05/13/25 0800   Drainage Characteristics/Odor No odor 05/12/25 0800   Appearance Red;Dry 05/13/25 0800   Tissue loss description Partial thickness 05/11/25 2000   Periwound Area [place] 05/10/25 1300   Care Cleansed with:;Soap and water 05/13/25 0800   Dressing Change Due 05/11/25 05/10/25 1300   Number of days: 7            Wound 05/08/25 0310 Pressure Injury Perirectal (Active)   05/08/25 0310 Perirectal   Present on Original Admission:    Primary Wound Type: Pressure inj   Side:    Orientation:    Wound Approximate Age at First Assessment (Weeks):    Wound Number:    Is this injury device related?:    Incision Type:    Closure Method:    Wound Description (Comments):    Type:    Additional Comments:    Ankle-Brachial Index:    Pulses:    Removal Indication and Assessment:    Wound Outcome:    Wound Image   05/13/25 0830   Dressing Appearance Open to air 05/13/25 0830   Drainage Amount None 05/13/25 0830   Drainage Characteristics/Odor No odor 05/12/25 0800   Appearance Red;Intact 05/13/25 0830   Tissue loss description Not applicable 05/13/25 0830   Red (%), Wound Tissue Color 100 % 05/13/25 0830   Periwound Area Intact;Dry 05/13/25 0830   Wound Edges Other (see comments) 05/13/25 0830   Care Cleansed with:;Soap and water;Applied:;Skin Barrier 05/13/25 0830   Dressing Applied 05/10/25 2000   Periwound Care Dry periwound area maintained 05/11/25 2000   Off Loading Pillow 05/11/25 2000   Dressing Change Due 05/11/25 05/10/25 1300   Number of days: 7         Musculoskeletal:    Transfer assist: Moderate Assistance    Weight Bearing Status: N/A    Comments: N/A    ADL Assist: Moderate Assistance    Special Equipment: walker    Radiology:    Radiology (Last 168  hours)               05/15 1127 Cardiac monitoring strips     05/15 0700 Cardiac monitoring strips     05/15 0230 Cardiac monitoring strips     05/14 2228 Cardiac monitoring strips     05/14 1827 Cardiac monitoring strips     05/14 1437 Cardiac monitoring strips     05/14 1041 Cardiac monitoring strips     05/14 0627 Cardiac monitoring strips     05/14 0311 Cardiac monitoring strips     05/13 2245 Cardiac monitoring strips     05/13 1934 Cardiac monitoring strips     05/13 1430 Cardiac monitoring strips     05/13 1026 Cardiac monitoring strips     05/13 0639 Cardiac monitoring strips     05/13 0213 Cardiac monitoring strips     05/12 2214 Cardiac monitoring strips     05/12 1823 Cardiac monitoring strips     05/12 1433 Cardiac monitoring strips     05/12 1032 Cardiac monitoring strips     05/12 0638 Cardiac monitoring strips     05/12 0243 Cardiac monitoring strips     05/11 2240 Cardiac monitoring strips     05/11 1834 Cardiac monitoring strips     05/11 1540 Cardiac monitoring strips     05/11 1522 CT Head Without Contrast       05/11 1432 Cardiac monitoring strips     05/11 1310 X-Ray Chest 1 View       05/11 1030 Cardiac monitoring strips     05/11 0636 Cardiac monitoring strips     05/11 0242 Cardiac monitoring strips     05/10 2225 Cardiac monitoring strips     05/10 1809 Cardiac monitoring strips     05/10 1432 Cardiac monitoring strips     05/10 1034 Cardiac monitoring strips     05/10 0623 Cardiac monitoring strips     05/10 0335 Cardiac monitoring strips     05/09 2215 Cardiac monitoring strips     05/09 1442 Cardiac monitoring strips     05/09 1101 Cardiac monitoring strips     05/09 0646 Cardiac monitoring strips     05/09 0338 Cardiac monitoring strips     05/08 2342 Cardiac monitoring strips     05/07 0000 Cardiac monitoring strips     05/07 0000 Cardiac monitoring strips     05/07 0000 Cardiac monitoring strips     05/07 0000 Cardiac monitoring strips     05/07 0000 Cardiac monitoring strips     05/07  0000 Cardiac monitoring strips     05/07 0000 Cardiac monitoring strips     05/07 0000 Cardiac monitoring strips     05/07 0000 Cardiac monitoring strips     05/07 0000 Cardiac monitoring strips     05/07 0000 Cardiac monitoring strips     05/07 0000 Cardiac monitoring strips     05/07 0000 Cardiac monitoring strips     05/07 0000 Cardiac monitoring strips     05/07 0000 Cardiac monitoring strips     05/07 0000 Cardiac monitoring strips     05/07 0000 Cardiac monitoring strips              CT Head Without Contrast  Narrative: CT head without contrast    INDICATION:  Mental status change of unknown cause para    TECHNIQUE:  Routine CT of the head was without contrast    Total DLP: 988 mGy.cm    Automatic exposure control was utilized to reduce the patient's dose    COMPARISON:  05/07/2025    FINDINGS:  No acute intra-cranial hemorrhage, midline shift, mass effect or extra-axial collection.    The ventricles are normal in size and configuration.  There are mild patchy areas of decreased attenuation in periventricular and deep white matter, while nonspecific, most likely sequela of chronic microvascular ischemia.    No sulcal effacement.  Normal grey-white matter differentiation.    Visualized osseous structures are unremarkable.  Visualized paranasal sinuses and mastoid air cells are clear.  Impression: No acute intracranial abnormality or significant interval changes    Electronically signed by: Jerzy Humphrey MD  Date:    05/11/2025  Time:    15:25  X-Ray Chest 1 View  Narrative: EXAMINATION:  XR CHEST 1 VIEW    CLINICAL HISTORY:  Sob;    TECHNIQUE:  One view    COMPARISON:  December 19, 2024..    FINDINGS:  Cardiopericardial silhouette is within normal limits.  There are bilateral lungs infiltrates which are more pronounced and more distributed on the right for which infectious cause is favored.  Please correlate clinically.  No significant fluid within the pleural spaces.  No pneumothorax.  Right axillary metallic  "clips.  Impression: Lungs infiltrates more distributed on the right.    Electronically signed by: Dank Barcenas  Date:    05/11/2025  Time:    13:21      Lab/Cultures:    Blood Urea Nitrogen   Date Value Ref Range Status   05/14/2025 27.1 (H) 9.8 - 20.1 mg/dL Final   05/13/2025 31.4 (H) 9.8 - 20.1 mg/dL Final   07/18/2024 25 5 - 25 mg/dL Final   12/29/2023 8 5 - 25 mg/dL Final     Creatinine   Date Value Ref Range Status   05/14/2025 0.93 0.55 - 1.02 mg/dL Final   05/13/2025 0.85 0.55 - 1.02 mg/dL Final   07/18/2024 0.86 0.55 - 1.02 mg/dL Final   12/29/2023 0.62 0.57 - 1.25 mg/dL Final     WBC   Date Value Ref Range Status   05/15/2025 10.04 4.50 - 11.50 x10(3)/mcL Final   05/14/2025 11.24 4.50 - 11.50 x10(3)/mcL Final   05/07/2025 33.77 x10(3)/mcL Final     White Blood Cell Count   Date Value Ref Range Status   04/07/2025 16 (H) 3.4 - 10.8 x10E3/uL Final      Blood Culture   Date Value Ref Range Status   05/07/2025 No Growth at 5 days  Final   05/07/2025 No Growth at 5 days  Final     Urine Culture   Date Value Ref Range Status   12/23/2023 (A)  Final    10,000 - 25,000 colonies/ml Enterococcus faecium VRE     No results for input(s): "PH", "PCO2", "PO2", "HCO3", "POCSATURATED", "BE" in the last 72 hours.       "

## 2025-05-15 NOTE — PT/OT/SLP PROGRESS
Occupational Therapy   Treatment    Name: Shyanne Cannon  MRN: [id number]  Admitting Diagnosis:  <principal problem not specified>       Recommendations:     Recommended therapy intensity at discharge: Moderate Intensity Therapy   Discharge Equipment Recommendations:  to be determined by next level of care  Barriers to discharge:  Decreased caregiver support    Assessment:     Shyanne Cannon is a 74 y.o. female with a medical diagnosis of encephalopathy, RL lobe pneumonia.  She presents with the following. Performance deficits affecting function are weakness, impaired cognition, impaired endurance, impaired self care skills, impaired balance, gait instability, impaired functional mobility, decreased safety awareness.     Rehab Prognosis:  Good; patient would benefit from acute skilled OT services to address these deficits and reach maximum level of function.       Plan:     Patient to be seen 5 x/week to address the above listed problems via self-care/home management, therapeutic activities, therapeutic exercises  Plan of Care Expires: 06/09/25  Plan of Care Reviewed with: patient    Subjective     Pain/Comfort:  Pain Rating 1: 0/10    Objective:     Communicated with: nrsg prior to session.  Patient found HOB elevated with bed alarm upon OT entry to room.    General Precautions: Standard, fall    Orthopedic Precautions:N/A  Braces: N/A  Respiratory Status: Nasal cannula, flow 2 L/min  Vital Signs: Sp02: 94     Occupational Performance:     Functional Mobility/Transfers:  Patient completed Toilet Transfer Step Transfer technique with minimum assistance with  rolling walker  Functional Mobility: slow but steady     Activities of Daily Living:  Grooming: stand by assistance stood at sink for oral care ; no LOB ; RW in front       [facility] 6 Click ADL: 16    Patient Education:  Patient provided with verbal education education regarding OT role/goals/POC, fall prevention, and safety awareness.  Understanding was  verbalized.      Patient left up in chair with all lines intact, call button in reach, and chair alarm on.    GOALS:   Multidisciplinary Problems       Occupational Therapy Goals          Problem: Occupational Therapy    Goal Priority Disciplines Outcome Interventions   Occupational Therapy Goal     OT, PT/OT Progressing    Description: Goals to be met by: in 1 month      Patient will increase functional independence with ADLs by performing:    UE Dressing with Modified [place].  LE Dressing with Modified [place].  Grooming while standing at sink with Modified [place].  Toileting from toilet with Modified [place] for hygiene and clothing management.   Toilet transfer to toilet with Modified [place].  Increased functional strength to 4/5 for BUE.                         Time Tracking:     OT Date of Treatment:    OT Start Time: 0955  OT Stop Time: 1014  OT Total Time (min): 19 min    Billable Minutes:Self Care/Home Management 19    OT/BINA: OT     Number of BINA visits since last OT visit: 3    5/15/2025

## 2025-05-15 NOTE — PROGRESS NOTES
[facility] Hospital Medicine Progress Note        Chief Complaint: Inpatient Follow-up for     HPI: Shyanne Cannon is a 74 y.o. female who has a medical history of bipolar disorder, anxiety, parkinsonism, memory loss, hypertension, hyperlipidemia, overactive bladder, chronic constipation, and COPD.  The patient presented to [facility] on 5/7/2025 with a primary complaint of fall and weakness.  Patient mentions that she has indoor 2 falls in last 4 days.  Yesterday, she had a fall as she was grabbing a picture of ice tea from the Fridge.  She fell to the ground and drop the entire patient advised to be on herself and the floor. After falling, she was too weak to stand back up to her feet so she laid there until her daughter came home.  She mentions that she lives alone and moves around with a rolling walker.  Daughter does daily wellness checks.  Patient denies any fever, chills, body aches, nausea, vomiting, loss his consciousness, head trauma, loss of bowel or bladder, or cough.     In the ED, vital signs were found to be normal.  CBC shows WBC count of 33 K. CMP is unremarkable.  CPK slightly elevated at 1443.  Troponin and lactic acid are WNL.  CT head shows no acute intracranial pathology.  CT abdomen pelvis shows no posttraumatic abnormality of the abdomen and pelvis.  CT spine shows no acute osseous abnormality, however ground-glass opacities are noted in the right lower lobe.  Blood cultures, urine cultures, sputum cultures pending.       Continue with vancomycin and cefepime, add DuoNebs p.r.n., Breo Ellipta.  SLP, PT/OT, and cm consulted.  Follow up cultures and narrow antibiotics as indicated.    Interval Hx:    Patient was seen and evaluated at bedside, oxygenating well on room air.  No complaints at this time.  Hypercalcemia has improved however we will order a urinary calcium, PTH, and vitamin-D level.  Give fluids and Lasix 1 time.    Case was discussed with patient's  nurse and  on the floor.    Objective/physical exam:  General: In no acute distress, afebrile, tremor in RUE  Chest: Poor air movement  Heart: RRR, +S1, S2, no appreciable murmur  Abdomen: Soft, nontender, BS +  MSK: Warm, no lower extremity edema, no clubbing or cyanosis  Neurologic: ALert but confused oriented to self and place, Cranial nerve II-XII intact, Strength 5/5 in all 4 extremities    VITAL SIGNS: 24 HRS MIN & MAX LAST   Temp  Min: 97.5 °F (36.4 °C)  Max: 98.6 °F (37 °C) 97.8 °F (36.6 °C)   BP  Min: 105/61  Max: 124/84 124/84   Pulse  Min: 75  Max: 87  87   Resp  Min: 12  Max: 20 18   SpO2  Min: 91 %  Max: 98 % (!) 92 %     I have reviewed the following labs:  Recent Labs   Lab 05/09/25  0656 05/14/25  0623 05/15/25  0515   WBC 13.38* 11.24 10.04   RBC 3.97* 4.62 4.45   HGB 10.6* 12.3 11.7*   HCT 33.8* 38.9 37.2   MCV 85.1 84.2 83.6   MCH 26.7* 26.6* 26.3*   MCHC 31.4* 31.6* 31.5*   RDW 15.1 15.0 14.8    349 348   MPV 9.6 9.4 9.5     Recent Labs   Lab 05/09/25  0656 05/10/25  1026 05/11/25  1430 05/12/25  0718 05/13/25  0538 05/14/25  0623   *   < >  --  136 133* 135*   K 4.5   < >  --  4.6 4.4 3.8   CL 94*   < >  --  93* 92* 91*   CO2 30   < >  --  31 30 36*   BUN 15.5   < >  --  28.5* 31.4* 27.1*   CREATININE 0.77   < >  --  0.71 0.85 0.93   *   < >  --  149* 116* 124*   CALCIUM 9.6   < >  --  10.6* 10.3* 10.3*   PH  --   --  7.460*  --   --   --    MG 1.30*  --   --   --   --   --    ALBUMIN 2.5*  --   --   --   --   --    PROT 5.8  --   --   --   --   --    ALKPHOS 81  --   --   --   --   --    ALT 19  --   --   --   --   --    AST 23  --   --   --   --   --    BILITOT 0.3  --   --   --   --   --     < > = values in this interval not displayed.     Microbiology Results (last 7 days)       Procedure Component Value Units Date/Time    Blood culture #2 **CANNOT BE ORDERED STAT** [[id number]]  (Normal) Collected: 05/07/25 2204    Order Status: Completed Specimen: Blood  Updated: 05/13/25 0105     Blood Culture No Growth at 5 days    Blood culture #1 **CANNOT BE ORDERED STAT** [[id number]]  (Normal) Collected: 05/07/25 2208    Order Status: Completed Specimen: Blood Updated: 05/13/25 0105     Blood Culture No Growth at 5 days             See below for Radiology    Assessment   Worsening encephalopathy, Cefepime-induced neurotoxicity?-resolved   Right lower lobe pneumonia; possibly due to aspiration pneumonia  Fall and weakness  Leukocytosis - resolved  Hypercalcemia, corrected 11     Other medical history includes bipolar disorder, anxiety, parkinsonism, memory loss, hypertension, hyperlipidemia, overactive bladder, chronic constipation, COPD     -CT completed head to toe which shows no acute abnormalities except for right lower lung pneumonia - ST recs soft diet with thin liquids  -started on vancomycin-stopped and cefepime; treat for 5-7 days  - Check VBG  - Bipap nightly  -follow up with blood, urine, sputum culture  -daily labs  -SLP, PT/OT, and cm consulted  -bowel regimen  -  CTH stable, ABG stable. will check bladder scan, UDS, tx for COPD exacerbation  - MRI brain - canceled as pt is now alert  - Neurology consult, appreciate recommendations  - Stopped cefepime due to concerns for neurotoxicity  - Completed 7-day course of abx on 5/14 for aspiration pneumonia  -urine calcium, PTH, vitamin-D level ordered  -give fluids 1 time with Lasix     VTE Prophylaxis:  Lovenox  Patient condition:  Stable  Anticipated discharge and Disposition:  Pending SNF placement      All diagnosis and differential diagnosis have been reviewed; assessment and plan has been documented; I have personally reviewed the labs and test results that are presently available; I have reviewed the patients medication list; I have reviewed the consulting providers response and recommendations. I have reviewed or attempted to review medical records based upon their availability    All of the patient's questions  have been  addressed and answered. Patient's is agreeable to the above stated plan. I will continue to monitor closely and make adjustments to medical management as needed.    Portions of this note dictated using EMR integrated voice recognition software, and may be subject to voice recognition errors not corrected at proofreading. Please contact writer for clarification if needed.   _____________________________________________________________________    Malnutrition Status:  Nutrition consulted. Most recent weight and BMI monitored-     Measurements:  Wt Readings from Last 1 Encounters:   05/08/25 91.9 kg (202 lb 9.6 oz)   Body mass index is 34.24 kg/m².    Patient has been screened and assessed by RD.    Malnutrition Type:  Context:    Level: other (see comments) (Does not meet criteria)    Malnutrition Characteristic Summary:  Weight Loss (Malnutrition): other (see comments) (Does not meet criteria)  Energy Intake (Malnutrition): less than or equal to 50% for greater than or equal to 5 days  Fluid Accumulation (Malnutrition): other (see comments) (Not present)    Interventions/Recommendations (treatment strategy):        Scheduled Med:   albuterol-ipratropium  3 mL Nebulization Q4H    DULoxetine  60 mg Oral Daily    fluticasone furoate-vilanteroL  1 puff Inhalation Daily    nystatin-triamcinolone   Topical (Top) TID    polyethylene glycol  17 g Oral Daily    senna  8.6 mg Oral Daily    tetrahydrozoline 0.05%  1 drop Both Eyes BID    traZODone  25 mg Oral QHS      Continuous Infusions:     PRN Meds:    Current Facility-Administered Medications:     acetaminophen, 650 mg, Oral, Q4H PRN    albuterol-ipratropium, 3 mL, Nebulization, Q4H PRN    aluminum-magnesium hydroxide-simethicone, 30 mL, Oral, QID PRN    bisacodyL, 10 mg, Rectal, Daily PRN    dextrose 50%, 12.5 g, Intravenous, PRN    dextrose 50%, 25 g, Intravenous, PRN    glucagon (human recombinant), 1 mg, Intramuscular, PRN    glucose, 16 g, Oral, PRN     glucose, 24 g, Oral, PRN    melatonin, 6 mg, Oral, Nightly PRN    sodium chloride 0.9%, 10 mL, Intravenous, PRN     Radiology:  I have personally reviewed the following imaging and agree with the radiologist.     CT Head Without Contrast  Narrative: CT head without contrast    INDICATION:  Mental status change of unknown cause para    TECHNIQUE:  Routine CT of the head was without contrast    Total DLP: 988 mGy.cm    Automatic exposure control was utilized to reduce the patient's dose    COMPARISON:  05/07/2025    FINDINGS:  No acute intra-cranial hemorrhage, midline shift, mass effect or extra-axial collection.    The ventricles are normal in size and configuration.  There are mild patchy areas of decreased attenuation in periventricular and deep white matter, while nonspecific, most likely sequela of chronic microvascular ischemia.    No sulcal effacement.  Normal grey-white matter differentiation.    Visualized osseous structures are unremarkable.  Visualized paranasal sinuses and mastoid air cells are clear.  Impression: No acute intracranial abnormality or significant interval changes    Electronically signed by: Jerzy Humphrey MD  Date:    05/11/2025  Time:    15:25  X-Ray Chest 1 View  Narrative: EXAMINATION:  XR CHEST 1 VIEW    CLINICAL HISTORY:  Sob;    TECHNIQUE:  One view    COMPARISON:  December 19, 2024..    FINDINGS:  Cardiopericardial silhouette is within normal limits.  There are bilateral lungs infiltrates which are more pronounced and more distributed on the right for which infectious cause is favored.  Please correlate clinically.  No significant fluid within the pleural spaces.  No pneumothorax.  Right axillary metallic clips.  Impression: Lungs infiltrates more distributed on the right.    Electronically signed by: Dank Barcenas  Date:    05/11/2025  Time:    13:21      Susie Londono MD  Department of Hospital Medicine   [facility]   05/15/2025

## 2025-05-16 LAB
ALBUMIN SERPL-MCNC: 3 G/DL (ref 3.4–4.8)
ALBUMIN/GLOB SERPL: 0.9 RATIO (ref 1.1–2)
ALP SERPL-CCNC: 58 UNIT/L (ref 40–150)
ALT SERPL-CCNC: 12 UNIT/L (ref 0–55)
ANION GAP SERPL CALC-SCNC: 6 MEQ/L
AST SERPL-CCNC: 13 UNIT/L (ref 11–45)
BASOPHILS # BLD AUTO: 0.07 X10(3)/MCL
BASOPHILS NFR BLD AUTO: 0.6 %
BILIRUB SERPL-MCNC: 0.4 MG/DL
BUN SERPL-MCNC: 17.7 MG/DL (ref 9.8–20.1)
CALCIUM SERPL-MCNC: 9.4 MG/DL (ref 8.4–10.2)
CHLORIDE SERPL-SCNC: 95 MMOL/L (ref 98–107)
CO2 SERPL-SCNC: 36 MMOL/L (ref 23–31)
CREAT SERPL-MCNC: 0.71 MG/DL (ref 0.55–1.02)
CREAT/UREA NIT SERPL: 25
DRUGS UR SCN NOM: NORMAL
EOSINOPHIL # BLD AUTO: 0.29 X10(3)/MCL (ref 0–0.9)
EOSINOPHIL NFR BLD AUTO: 2.6 %
ERYTHROCYTE [DISTWIDTH] IN BLOOD BY AUTOMATED COUNT: 14.9 % (ref 11.5–17)
GFR SERPLBLD CREATININE-BSD FMLA CKD-EPI: >60 ML/MIN/1.73/M2
GLOBULIN SER-MCNC: 3.3 GM/DL (ref 2.4–3.5)
GLUCOSE SERPL-MCNC: 121 MG/DL (ref 82–115)
HCT VFR BLD AUTO: 36.7 % (ref 37–47)
HGB BLD-MCNC: 11.5 G/DL (ref 12–16)
IMM GRANULOCYTES # BLD AUTO: 0.11 X10(3)/MCL (ref 0–0.04)
IMM GRANULOCYTES NFR BLD AUTO: 1 %
LYMPHOCYTES # BLD AUTO: 1.98 X10(3)/MCL (ref 0.6–4.6)
LYMPHOCYTES NFR BLD AUTO: 17.8 %
MAGNESIUM SERPL-MCNC: 1.5 MG/DL (ref 1.6–2.6)
MCH RBC QN AUTO: 26.6 PG (ref 27–31)
MCHC RBC AUTO-ENTMCNC: 31.3 G/DL (ref 33–36)
MCV RBC AUTO: 84.8 FL (ref 80–94)
MONOCYTES # BLD AUTO: 0.64 X10(3)/MCL (ref 0.1–1.3)
MONOCYTES NFR BLD AUTO: 5.7 %
NEUTROPHILS # BLD AUTO: 8.06 X10(3)/MCL (ref 2.1–9.2)
NEUTROPHILS NFR BLD AUTO: 72.3 %
NRBC BLD AUTO-RTO: 0 %
PHOSPHATE SERPL-MCNC: 3.2 MG/DL (ref 2.3–4.7)
PLATELET # BLD AUTO: 357 X10(3)/MCL (ref 130–400)
PMV BLD AUTO: 9.7 FL (ref 7.4–10.4)
POTASSIUM SERPL-SCNC: 4.2 MMOL/L (ref 3.5–5.1)
PROT SERPL-MCNC: 6.3 GM/DL (ref 5.8–7.6)
RBC # BLD AUTO: 4.33 X10(6)/MCL (ref 4.2–5.4)
SODIUM SERPL-SCNC: 137 MMOL/L (ref 136–145)
WBC # BLD AUTO: 11.15 X10(3)/MCL (ref 4.5–11.5)

## 2025-05-16 PROCEDURE — 84100 ASSAY OF PHOSPHORUS: CPT | Performed by: STUDENT IN AN ORGANIZED HEALTH CARE EDUCATION/TRAINING PROGRAM

## 2025-05-16 PROCEDURE — 21400001 HC TELEMETRY ROOM

## 2025-05-16 PROCEDURE — 99900031 HC PATIENT EDUCATION (STAT)

## 2025-05-16 PROCEDURE — 94640 AIRWAY INHALATION TREATMENT: CPT

## 2025-05-16 PROCEDURE — 25000242 PHARM REV CODE 250 ALT 637 W/ HCPCS: Performed by: INTERNAL MEDICINE

## 2025-05-16 PROCEDURE — 11000001 HC ACUTE MED/SURG PRIVATE ROOM

## 2025-05-16 PROCEDURE — 97116 GAIT TRAINING THERAPY: CPT | Mod: CQ

## 2025-05-16 PROCEDURE — 36415 COLL VENOUS BLD VENIPUNCTURE: CPT | Performed by: STUDENT IN AN ORGANIZED HEALTH CARE EDUCATION/TRAINING PROGRAM

## 2025-05-16 PROCEDURE — 85025 COMPLETE CBC W/AUTO DIFF WBC: CPT | Performed by: STUDENT IN AN ORGANIZED HEALTH CARE EDUCATION/TRAINING PROGRAM

## 2025-05-16 PROCEDURE — 25000003 PHARM REV CODE 250

## 2025-05-16 PROCEDURE — 97535 SELF CARE MNGMENT TRAINING: CPT | Mod: CO

## 2025-05-16 PROCEDURE — 99900035 HC TECH TIME PER 15 MIN (STAT)

## 2025-05-16 PROCEDURE — 25000003 PHARM REV CODE 250: Performed by: STUDENT IN AN ORGANIZED HEALTH CARE EDUCATION/TRAINING PROGRAM

## 2025-05-16 PROCEDURE — 94761 N-INVAS EAR/PLS OXIMETRY MLT: CPT

## 2025-05-16 PROCEDURE — 94760 N-INVAS EAR/PLS OXIMETRY 1: CPT

## 2025-05-16 PROCEDURE — 80053 COMPREHEN METABOLIC PANEL: CPT | Performed by: STUDENT IN AN ORGANIZED HEALTH CARE EDUCATION/TRAINING PROGRAM

## 2025-05-16 PROCEDURE — 63600175 PHARM REV CODE 636 W HCPCS: Performed by: STUDENT IN AN ORGANIZED HEALTH CARE EDUCATION/TRAINING PROGRAM

## 2025-05-16 PROCEDURE — 83735 ASSAY OF MAGNESIUM: CPT | Performed by: STUDENT IN AN ORGANIZED HEALTH CARE EDUCATION/TRAINING PROGRAM

## 2025-05-16 PROCEDURE — 94799 UNLISTED PULMONARY SVC/PX: CPT

## 2025-05-16 PROCEDURE — 27100171 HC OXYGEN HIGH FLOW UP TO 24 HOURS

## 2025-05-16 PROCEDURE — 25000242 PHARM REV CODE 250 ALT 637 W/ HCPCS: Performed by: STUDENT IN AN ORGANIZED HEALTH CARE EDUCATION/TRAINING PROGRAM

## 2025-05-16 RX ORDER — MAGNESIUM SULFATE HEPTAHYDRATE 40 MG/ML
2 INJECTION, SOLUTION INTRAVENOUS ONCE
Status: COMPLETED | OUTPATIENT
Start: 2025-05-16 | End: 2025-05-16

## 2025-05-16 RX ADMIN — IPRATROPIUM BROMIDE AND ALBUTEROL SULFATE 3 ML: .5; 3 SOLUTION RESPIRATORY (INHALATION) at 08:05

## 2025-05-16 RX ADMIN — FLUTICASONE FUROATE AND VILANTEROL TRIFENATATE 1 PUFF: 100; 25 POWDER RESPIRATORY (INHALATION) at 09:05

## 2025-05-16 RX ADMIN — NYSTATIN AND TRIAMCINOLONE ACETONIDE: 100000; 1 CREAM TOPICAL at 03:05

## 2025-05-16 RX ADMIN — FUROSEMIDE 20 MG: 10 INJECTION, SOLUTION INTRAMUSCULAR; INTRAVENOUS at 09:05

## 2025-05-16 RX ADMIN — IPRATROPIUM BROMIDE AND ALBUTEROL SULFATE 3 ML: .5; 3 SOLUTION RESPIRATORY (INHALATION) at 12:05

## 2025-05-16 RX ADMIN — MAGNESIUM SULFATE HEPTAHYDRATE 2 G: 40 INJECTION, SOLUTION INTRAVENOUS at 04:05

## 2025-05-16 RX ADMIN — MAGNESIUM SULFATE HEPTAHYDRATE 2 G: 40 INJECTION, SOLUTION INTRAVENOUS at 06:05

## 2025-05-16 RX ADMIN — Medication 1 DROP: at 09:05

## 2025-05-16 RX ADMIN — NYSTATIN AND TRIAMCINOLONE ACETONIDE: 100000; 1 CREAM TOPICAL at 09:05

## 2025-05-16 RX ADMIN — IPRATROPIUM BROMIDE AND ALBUTEROL SULFATE 3 ML: .5; 3 SOLUTION RESPIRATORY (INHALATION) at 04:05

## 2025-05-16 RX ADMIN — TRAZODONE HYDROCHLORIDE 25 MG: 50 TABLET ORAL at 09:05

## 2025-05-16 RX ADMIN — SODIUM CHLORIDE: 9 INJECTION, SOLUTION INTRAVENOUS at 03:05

## 2025-05-16 RX ADMIN — DULOXETINE 60 MG: 30 CAPSULE, DELAYED RELEASE ORAL at 09:05

## 2025-05-16 RX ADMIN — POLYETHYLENE GLYCOL 3350 17 G: 17 POWDER, FOR SOLUTION ORAL at 09:05

## 2025-05-16 RX ADMIN — SENNOSIDES 8.6 MG: 8.6 TABLET, FILM COATED ORAL at 09:05

## 2025-05-16 NOTE — PT/OT/SLP PROGRESS
"Physical Therapy Treatment    Patient Name:  Shyanne Cannon   MRN:  [id number]    Recommendations:     Discharge therapy intensity: Moderate Intensity Therapy   Discharge Equipment Recommendations: to be determined by next level of care  Barriers to discharge: Decreased caregiver support, Impaired mobility, and Ongoing medical needs    Assessment:     Shyanne Cannon is a 74 y.o. female admitted with a medical diagnosis of R lower lobe pneumonia 2/2 aspiration pneumonia, fall, hx of parkinson's .  She presents with the following impairments/functional limitations: weakness, gait instability, impaired balance, impaired endurance, decreased safety awareness, impaired functional mobility.    Rehab Prognosis: Good; patient would benefit from acute skilled PT services to address these deficits and reach maximum level of function.    Recent Surgery: * No surgery found *      Plan:     During this hospitalization, patient would benefit from acute PT services 5 x/week to address the identified rehab impairments via gait training, therapeutic activities, therapeutic exercises and progress toward the following goals:    Plan of Care Expires:  06/09/25    Subjective     Chief Complaint: "I never buy cookies and my daughter bought me so many cookies"  Patient/Family Comments/goals: none stated  Pain/Comfort:  Pain Rating 1: 0/10      Objective:     Communicated with nurse prior to session.  Patient found HOB elevated with bed alarm, peripheral IV, telemetry, pulse ox (continuous) upon PT entry to room.     General Precautions: Standard, fall  Orthopedic Precautions: N/A  Braces: N/A  Respiratory Status: Nasal cannula, flow 2 L/min  Blood Pressure: nt  Skin Integrity: Visible skin intact      Functional Mobility:  Bed Mobility:     Scooting: contact guard assistance  Supine to Sit: contact guard assistance  Transfers:  Sit to Stand:  contact guard assistance with rolling walker  Bed to Chair: contact guard assistance with "  rolling walker  using  Step Transfer  Toilet Transfer: contact guard assistance with  rolling walker  using  Step Transfer  Gait: patient amb 90ftx2 with rolling walker with step through gait pattern. Patient required seated rest break in between amb trials.    Education:  Patient provided with verbal education education regarding PT role/goals/POC, post-op precautions, fall prevention, and safety awareness.  Understanding was verbalized.     Patient left up in chair with all lines intact and call button in reach    GOALS:   Multidisciplinary Problems       Physical Therapy Goals          Problem: Physical Therapy    Goal Priority Disciplines Outcome Interventions   Physical Therapy Goal     PT, PT/OT Progressing    Description: Goals to be met by: 25     Patient will increase functional independence with mobility by performin. Supine to sit with Modified [place]  2. Sit to supine with Modified [place]  3. Sit to stand transfer with Modified [place]  4. Bed to chair transfer with Modified [place] using Rolling Walker  5. Gait  x 150 feet with Modified [place] using Rolling Walker.                          Time Tracking:     PT Received On: 25  PT Start Time: 1032     PT Stop Time: 1055  PT Total Time (min): 23 min     Billable Minutes: Gait Training 23    Treatment Type: Treatment  PT/PTA: PTA     Number of PTA visits since last PT visit: 4     2025

## 2025-05-16 NOTE — PHYSICIAN QUERY
Please provide the respiratory diagnosis:    Chronic Respiratory Failure with Hypoxia and Hypercapnia

## 2025-05-16 NOTE — PT/OT/SLP PROGRESS
Occupational Therapy   Treatment    Name: Shyanne Cannon  MRN: [id number]    Recommendations:     Recommended therapy intensity at discharge: Moderate Intensity Therapy   Discharge Equipment Recommendations:  to be determined by next level of care  Barriers to discharge:       Assessment:     Shyanne Cannon is a 74 y.o. female with a medical diagnosis of R lower lobe pneumonia 2/2 aspiration pneumonia, fall, hx of parkinson's. Performance deficits affecting function are weakness, impaired cognition, impaired endurance, impaired self care skills, impaired balance, gait instability, impaired functional mobility, decreased safety awareness.     Rehab Prognosis:  Good; patient would benefit from acute skilled OT services to address these deficits and reach maximum level of function.       Plan:     Patient to be seen 5 x/week to address the above listed problems via self-care/home management, therapeutic activities, therapeutic exercises  Plan of Care Expires: 06/09/25  Plan of Care Reviewed with: patient    Subjective     Pain/Comfort:  Pain Rating 1: 0/10    Objective:     Communicated with: RN prior to session.  Patient found HOB elevated with peripheral IV, telemetry upon OT entry to room.    General Precautions: Standard, fall    Orthopedic Precautions:N/A  Braces: N/A  Respiratory Status: Room air     Occupational Performance:     Bed Mobility:    Patient completed Scooting/Bridging with contact guard assistance  Patient completed Supine to Sit with contact guard assistance     Functional Mobility/Transfers:  Patient completed Sit <> Stand Transfer with contact guard assistance  with  rolling walker   Patient completed Toilet Transfer Step Transfer technique with contact guard assistance with  rolling walker  Functional Mobility: ambulated to bathroom with CGA and RW. No LOB.     Activities of Daily Living:  LE dressing: donned socks with SBA from long sitting position.     Therapeutic Positioning    OT  interventions performed during the course of today's session in an effort to prevent and/or reduce acquired pressure injuries:   Education was provided on benefits of and recommendations for therapeutic positioning    [facility] 6 Click ADL: 21    Patient Education:  Patient provided with verbal education education regarding OT role/goals/POC, fall prevention, and safety awareness.  Understanding was verbalized.      Patient left up in chair with all lines intact, call button in reach, and chair alarm on.    GOALS:   Multidisciplinary Problems       Occupational Therapy Goals          Problem: Occupational Therapy    Goal Priority Disciplines Outcome Interventions   Occupational Therapy Goal     OT, PT/OT Progressing    Description: Goals to be met by: in 1 month      Patient will increase functional independence with ADLs by performing:    UE Dressing with Modified [place].  LE Dressing with Modified [place].  Grooming while standing at sink with Modified [place].  Toileting from toilet with Modified [place] for hygiene and clothing management.   Toilet transfer to toilet with Modified [place].  Increased functional strength to 4/5 for BUE.                         Time Tracking:     OT Date of Treatment: 05/16/25  OT Start Time: 1032  OT Stop Time: 1055  OT Total Time (min): 23 min    Billable Minutes:Self Care/Home Management 23    OT/BINA: BINA     Number of BINA visits since last OT visit: 4    5/16/2025

## 2025-05-16 NOTE — PROGRESS NOTES
[facility] [address]  Wound Care    Patient Name:  Shyanne Cannon   MRN:  [id number]  Date: 5/16/2025  Diagnosis: <principal problem not specified>    History:     No past medical history on file.    Social History[1]    Precautions:     Allergies as of 05/07/2025 - Reviewed 05/07/2025   Allergen Reaction Noted    Cephalexin Hives 10/09/2015    Levofloxacin Hives, Other (See Comments), and Rash 10/09/2015       WO Assessment Details/Treatment        05/16/25 0930   WO Assessment   Visit Date 05/16/25   Visit Time 0930   Consult Type Follow Up   [facility] Speciality Wound   Intervention chart review;applied   Teaching on-going        Wound 05/08/25 0310 Pressure Injury Perirectal   Date First Assessed/Time First Assessed: 05/08/25 0310   Primary Wound Type: Pressure Injury  Location: Perirectal   Wound Image    Pressure Injury Stage OTH  (suggestive of IAD, such as candidiasis)   Dressing Appearance Open to air   Drainage Amount None   Drainage Characteristics/Odor No odor   Appearance Red;Intact   Tissue loss description Not applicable   Black (%), Wound Tissue Color 0 %   Red (%), Wound Tissue Color 100 %   Yellow (%), Wound Tissue Color 0 %   Periwound Area Dry;Intact   Care Cleansed with:;Antimicrobial agent;Soap and water   Dressing Applied;Other (comment)  (Zinc oxide)     [facility] Follow up for sacrum. Nurse present at bedside. Continue current treatment recommendations in place. TERRIE Mc assisted with pt/ orders. Sacrum: cleanse well with soap and water. Apply Zinc oxide. Nursing to continue with turning every two hours and apply wedge. Pt had no further questions or concerns. Wound care will follow up.     05/16/2025         [1]   Social History  Socioeconomic History    Marital status: Single     Social Drivers of Health     Financial Resource Strain: Low Risk  (5/10/2025)    Overall Financial Resource Strain (CARDIA)     Difficulty of Paying Living Expenses: Not very hard   Food  Insecurity: No Food Insecurity (5/10/2025)    Hunger Vital Sign     Worried About Running Out of Food in the Last Year: Never true     Ran Out of Food in the Last Year: Never true   Transportation Needs: Unmet Transportation Needs (5/10/2025)    PRAPARE - Transportation     Lack of Transportation (Medical): Yes     Lack of Transportation (Non-Medical): Yes   Physical Activity: Inactive (5/8/2025)    Exercise Vital Sign     Days of Exercise per Week: 0 days     Minutes of Exercise per Session: 0 min   Stress: Stress Concern Present (5/10/2025)    [place] [employer] - Occupational Stress Questionnaire     Feeling of Stress : To some extent   Housing Stability: Low Risk  (5/10/2025)    Housing Stability Vital Sign     Unable to Pay for Housing in the Last Year: No     Homeless in the Last Year: No

## 2025-05-16 NOTE — PROGRESS NOTES
[facility] Hospital Medicine Progress Note        Chief Complaint: Inpatient Follow-up for     HPI: Shyanne Cannon is a 74 y.o. female who has a medical history of bipolar disorder, anxiety, parkinsonism, memory loss, hypertension, hyperlipidemia, overactive bladder, chronic constipation, and COPD.  The patient presented to [facility] on 5/7/2025 with a primary complaint of fall and weakness.  Patient mentions that she has indoor 2 falls in last 4 days.  Yesterday, she had a fall as she was grabbing a picture of ice tea from the Fridge.  She fell to the ground and drop the entire patient advised to be on herself and the floor. After falling, she was too weak to stand back up to her feet so she laid there until her daughter came home.  She mentions that she lives alone and moves around with a rolling walker.  Daughter does daily wellness checks.  Patient denies any fever, chills, body aches, nausea, vomiting, loss his consciousness, head trauma, loss of bowel or bladder, or cough.     In the ED, vital signs were found to be normal.  CBC shows WBC count of 33 K. CMP is unremarkable.  CPK slightly elevated at 1443.  Troponin and lactic acid are WNL.  CT head shows no acute intracranial pathology.  CT abdomen pelvis shows no posttraumatic abnormality of the abdomen and pelvis.  CT spine shows no acute osseous abnormality, however ground-glass opacities are noted in the right lower lobe.  Blood cultures, urine cultures, sputum cultures pending.       Continue with vancomycin and cefepime, add DuoNebs p.r.n., Breo Ellipta.  SLP, PT/OT, and cm consulted.  Follow up cultures and narrow antibiotics as indicated.    Interval Hx:    Patient was seen and evaluated at bedside, oxygenating well on room air.  No complaints at this time.  Hypercalcemia resolved with fluids and lasix one time. Ucalcium and PTH and wnl. Vitamin-D level is elevated but not in toxic range. Likely all due to dehydration.  Can order TSH to complete mild hypercalcemia w/u. Placement pending.    Case was discussed with patient's nurse and  on the floor.    Objective/physical exam:  General: In no acute distress, afebrile, tremor in RUE  Chest: Poor air movement  Heart: RRR, +S1, S2, no appreciable murmur  Abdomen: Soft, nontender, BS +  MSK: Warm, no lower extremity edema, no clubbing or cyanosis  Neurologic: ALert but confused oriented to self and place, Cranial nerve II-XII intact, Strength 5/5 in all 4 extremities    VITAL SIGNS: 24 HRS MIN & MAX LAST   Temp  Min: 97.7 °F (36.5 °C)  Max: 98.2 °F (36.8 °C) 98 °F (36.7 °C)   BP  Min: 102/57  Max: 128/82 (!) 102/57   Pulse  Min: 68  Max: 91  83   Resp  Min: 15  Max: 20 16   SpO2  Min: 93 %  Max: 99 % (!) 94 %     I have reviewed the following labs:  Recent Labs   Lab 05/14/25  0623 05/15/25  0515 05/16/25  0704   WBC 11.24 10.04 11.15   RBC 4.62 4.45 4.33   HGB 12.3 11.7* 11.5*   HCT 38.9 37.2 36.7*   MCV 84.2 83.6 84.8   MCH 26.6* 26.3* 26.6*   MCHC 31.6* 31.5* 31.3*   RDW 15.0 14.8 14.9    348 357   MPV 9.4 9.5 9.7     Recent Labs   Lab 05/11/25  1430 05/12/25  0718 05/13/25  0538 05/14/25  0623 05/16/25  0704   NA  --    < > 133* 135* 137   K  --    < > 4.4 3.8 4.2   CL  --    < > 92* 91* 95*   CO2  --    < > 30 36* 36*   BUN  --    < > 31.4* 27.1* 17.7   CREATININE  --    < > 0.85 0.93 0.71   GLU  --    < > 116* 124* 121*   CALCIUM  --    < > 10.3* 10.3* 9.4   PH 7.460*  --   --   --   --    MG  --   --   --   --  1.50*   ALBUMIN  --   --   --   --  3.0*   PROT  --   --   --   --  6.3   ALKPHOS  --   --   --   --  58   ALT  --   --   --   --  12   AST  --   --   --   --  13   BILITOT  --   --   --   --  0.4    < > = values in this interval not displayed.     Microbiology Results (last 7 days)       Procedure Component Value Units Date/Time    Blood culture #2 **CANNOT BE ORDERED STAT** [[id number]]  (Normal) Collected: 05/07/25 9210    Order Status: Completed  Specimen: Blood Updated: 05/13/25 0105     Blood Culture No Growth at 5 days    Blood culture #1 **CANNOT BE ORDERED STAT** [[id number]]  (Normal) Collected: 05/07/25 2208    Order Status: Completed Specimen: Blood Updated: 05/13/25 0105     Blood Culture No Growth at 5 days             See below for Radiology    Assessment   Worsening encephalopathy, Cefepime-induced neurotoxicity?-resolved   Right lower lobe pneumonia; possibly due to aspiration pneumonia  Fall and weakness  Leukocytosis - resolved  Hypercalcemia, secondary to Elevated vitD stores and possibly dehydration     Other medical history includes bipolar disorder, anxiety, parkinsonism, memory loss, hypertension, hyperlipidemia, overactive bladder, chronic constipation, COPD     -CT completed head to toe which shows no acute abnormalities except for right lower lung pneumonia - ST recs soft diet with thin liquids  -started on vancomycin-stopped and cefepime; treat for 5-7 days  - Check VBG  - Bipap nightly  -follow up with blood, urine, sputum culture  -daily labs  -SLP, PT/OT, and cm consulted  -bowel regimen  -  CTH stable, ABG stable. will check bladder scan, UDS, tx for COPD exacerbation  - MRI brain - canceled as pt is now alert  - Neurology consult, appreciate recommendations  - Stopped cefepime due to concerns for neurotoxicity  - Completed 7-day course of abx on 5/14 for aspiration pneumonia  -urine calcium and PTH are wnl  -elevated vitamin-D level-but it is not in toxic range  -Likely hypercalcemia is due to elevated vitaminD levels and dehydration as Ca,PO4, and vitD levels were all elevated   -hypercalcemia is fluid responsive     VTE Prophylaxis:  Lovenox  Patient condition:  Stable  Anticipated discharge and Disposition:  Pending SNF placement      All diagnosis and differential diagnosis have been reviewed; assessment and plan has been documented; I have personally reviewed the labs and test results that are presently available; I have  reviewed the patients medication list; I have reviewed the consulting providers response and recommendations. I have reviewed or attempted to review medical records based upon their availability    All of the patient's questions have been  addressed and answered. Patient's is agreeable to the above stated plan. I will continue to monitor closely and make adjustments to medical management as needed.    Portions of this note dictated using EMR integrated voice recognition software, and may be subject to voice recognition errors not corrected at proofreading. Please contact writer for clarification if needed.   _____________________________________________________________________    Malnutrition Status:  Nutrition consulted. Most recent weight and BMI monitored-     Measurements:  Wt Readings from Last 1 Encounters:   05/08/25 91.9 kg (202 lb 9.6 oz)   Body mass index is 34.24 kg/m².    Patient has been screened and assessed by RD.    Malnutrition Type:  Context:    Level: other (see comments) (Does not meet criteria)    Malnutrition Characteristic Summary:  Weight Loss (Malnutrition): other (see comments) (Does not meet criteria)  Energy Intake (Malnutrition): less than or equal to 50% for greater than or equal to 5 days  Fluid Accumulation (Malnutrition): other (see comments) (Not present)    Interventions/Recommendations (treatment strategy):        Scheduled Med:   albuterol-ipratropium  3 mL Nebulization Q4H    DULoxetine  60 mg Oral Daily    fluticasone furoate-vilanteroL  1 puff Inhalation Daily    furosemide (LASIX) injection  20 mg Intravenous Daily    magnesium sulfate 2 g IVPB  2 g Intravenous Once    Followed by    magnesium sulfate 2 g IVPB  2 g Intravenous Once    nystatin-triamcinolone   Topical (Top) TID    polyethylene glycol  17 g Oral Daily    senna  8.6 mg Oral Daily    tetrahydrozoline 0.05%  1 drop Both Eyes BID    traZODone  25 mg Oral QHS      Continuous Infusions:     PRN Meds:    Current  Facility-Administered Medications:     acetaminophen, 650 mg, Oral, Q4H PRN    albuterol-ipratropium, 3 mL, Nebulization, Q4H PRN    aluminum-magnesium hydroxide-simethicone, 30 mL, Oral, QID PRN    bisacodyL, 10 mg, Rectal, Daily PRN    dextrose 50%, 12.5 g, Intravenous, PRN    dextrose 50%, 25 g, Intravenous, PRN    glucagon (human recombinant), 1 mg, Intramuscular, PRN    glucose, 16 g, Oral, PRN    glucose, 24 g, Oral, PRN    melatonin, 6 mg, Oral, Nightly PRN    sodium chloride 0.9%, 10 mL, Intravenous, PRN     Radiology:  I have personally reviewed the following imaging and agree with the radiologist.     CT Head Without Contrast  Narrative: CT head without contrast    INDICATION:  Mental status change of unknown cause para    TECHNIQUE:  Routine CT of the head was without contrast    Total DLP: 988 mGy.cm    Automatic exposure control was utilized to reduce the patient's dose    COMPARISON:  05/07/2025    FINDINGS:  No acute intra-cranial hemorrhage, midline shift, mass effect or extra-axial collection.    The ventricles are normal in size and configuration.  There are mild patchy areas of decreased attenuation in periventricular and deep white matter, while nonspecific, most likely sequela of chronic microvascular ischemia.    No sulcal effacement.  Normal grey-white matter differentiation.    Visualized osseous structures are unremarkable.  Visualized paranasal sinuses and mastoid air cells are clear.  Impression: No acute intracranial abnormality or significant interval changes    Electronically signed by: Jerzy Humphrey MD  Date:    05/11/2025  Time:    15:25  X-Ray Chest 1 View  Narrative: EXAMINATION:  XR CHEST 1 VIEW    CLINICAL HISTORY:  Sob;    TECHNIQUE:  One view    COMPARISON:  December 19, 2024..    FINDINGS:  Cardiopericardial silhouette is within normal limits.  There are bilateral lungs infiltrates which are more pronounced and more distributed on the right for which infectious cause is  favored.  Please correlate clinically.  No significant fluid within the pleural spaces.  No pneumothorax.  Right axillary metallic clips.  Impression: Lungs infiltrates more distributed on the right.    Electronically signed by: Dank Barcenas  Date:    05/11/2025  Time:    13:21      Susie Londono MD  Department of Hospital Medicine   [facility]   05/16/2025

## 2025-05-17 LAB
ALBUMIN SERPL-MCNC: 3.1 G/DL (ref 3.4–4.8)
ALBUMIN/GLOB SERPL: 1 RATIO (ref 1.1–2)
ALP SERPL-CCNC: 64 UNIT/L (ref 40–150)
ALT SERPL-CCNC: 12 UNIT/L (ref 0–55)
ANION GAP SERPL CALC-SCNC: 8 MEQ/L
AST SERPL-CCNC: 13 UNIT/L (ref 11–45)
BILIRUB SERPL-MCNC: 0.3 MG/DL
BUN SERPL-MCNC: 17.2 MG/DL (ref 9.8–20.1)
CALCIUM SERPL-MCNC: 8.9 MG/DL (ref 8.4–10.2)
CHLORIDE SERPL-SCNC: 95 MMOL/L (ref 98–107)
CO2 SERPL-SCNC: 29 MMOL/L (ref 23–31)
CREAT SERPL-MCNC: 0.69 MG/DL (ref 0.55–1.02)
CREAT/UREA NIT SERPL: 25
GFR SERPLBLD CREATININE-BSD FMLA CKD-EPI: >60 ML/MIN/1.73/M2
GLOBULIN SER-MCNC: 3.2 GM/DL (ref 2.4–3.5)
GLUCOSE SERPL-MCNC: 136 MG/DL (ref 82–115)
MAGNESIUM SERPL-MCNC: 1.9 MG/DL (ref 1.6–2.6)
PHOSPHATE SERPL-MCNC: 2.7 MG/DL (ref 2.3–4.7)
POTASSIUM SERPL-SCNC: 4.1 MMOL/L (ref 3.5–5.1)
PROT SERPL-MCNC: 6.3 GM/DL (ref 5.8–7.6)
SODIUM SERPL-SCNC: 132 MMOL/L (ref 136–145)
TSH SERPL-ACNC: 1.61 UIU/ML (ref 0.35–4.94)

## 2025-05-17 PROCEDURE — 21400001 HC TELEMETRY ROOM

## 2025-05-17 PROCEDURE — 99900031 HC PATIENT EDUCATION (STAT)

## 2025-05-17 PROCEDURE — 25000242 PHARM REV CODE 250 ALT 637 W/ HCPCS: Performed by: INTERNAL MEDICINE

## 2025-05-17 PROCEDURE — 36415 COLL VENOUS BLD VENIPUNCTURE: CPT | Performed by: STUDENT IN AN ORGANIZED HEALTH CARE EDUCATION/TRAINING PROGRAM

## 2025-05-17 PROCEDURE — 94640 AIRWAY INHALATION TREATMENT: CPT

## 2025-05-17 PROCEDURE — 25000003 PHARM REV CODE 250: Performed by: STUDENT IN AN ORGANIZED HEALTH CARE EDUCATION/TRAINING PROGRAM

## 2025-05-17 PROCEDURE — 99900035 HC TECH TIME PER 15 MIN (STAT)

## 2025-05-17 PROCEDURE — 94760 N-INVAS EAR/PLS OXIMETRY 1: CPT

## 2025-05-17 PROCEDURE — 84443 ASSAY THYROID STIM HORMONE: CPT | Performed by: STUDENT IN AN ORGANIZED HEALTH CARE EDUCATION/TRAINING PROGRAM

## 2025-05-17 PROCEDURE — 27100171 HC OXYGEN HIGH FLOW UP TO 24 HOURS

## 2025-05-17 PROCEDURE — 84100 ASSAY OF PHOSPHORUS: CPT | Performed by: STUDENT IN AN ORGANIZED HEALTH CARE EDUCATION/TRAINING PROGRAM

## 2025-05-17 PROCEDURE — 83735 ASSAY OF MAGNESIUM: CPT | Performed by: STUDENT IN AN ORGANIZED HEALTH CARE EDUCATION/TRAINING PROGRAM

## 2025-05-17 PROCEDURE — 25000003 PHARM REV CODE 250

## 2025-05-17 PROCEDURE — 94761 N-INVAS EAR/PLS OXIMETRY MLT: CPT

## 2025-05-17 PROCEDURE — 94660 CPAP INITIATION&MGMT: CPT

## 2025-05-17 PROCEDURE — 80053 COMPREHEN METABOLIC PANEL: CPT | Performed by: STUDENT IN AN ORGANIZED HEALTH CARE EDUCATION/TRAINING PROGRAM

## 2025-05-17 PROCEDURE — 11000001 HC ACUTE MED/SURG PRIVATE ROOM

## 2025-05-17 PROCEDURE — 25000242 PHARM REV CODE 250 ALT 637 W/ HCPCS: Performed by: STUDENT IN AN ORGANIZED HEALTH CARE EDUCATION/TRAINING PROGRAM

## 2025-05-17 PROCEDURE — 94799 UNLISTED PULMONARY SVC/PX: CPT

## 2025-05-17 PROCEDURE — 63600175 PHARM REV CODE 636 W HCPCS: Performed by: STUDENT IN AN ORGANIZED HEALTH CARE EDUCATION/TRAINING PROGRAM

## 2025-05-17 RX ADMIN — POLYETHYLENE GLYCOL 3350 17 G: 17 POWDER, FOR SOLUTION ORAL at 09:05

## 2025-05-17 RX ADMIN — FUROSEMIDE 20 MG: 10 INJECTION, SOLUTION INTRAMUSCULAR; INTRAVENOUS at 09:05

## 2025-05-17 RX ADMIN — IPRATROPIUM BROMIDE AND ALBUTEROL SULFATE 3 ML: .5; 3 SOLUTION RESPIRATORY (INHALATION) at 07:05

## 2025-05-17 RX ADMIN — NYSTATIN AND TRIAMCINOLONE ACETONIDE: 100000; 1 CREAM TOPICAL at 09:05

## 2025-05-17 RX ADMIN — Medication 1 DROP: at 09:05

## 2025-05-17 RX ADMIN — FLUTICASONE FUROATE AND VILANTEROL TRIFENATATE 1 PUFF: 100; 25 POWDER RESPIRATORY (INHALATION) at 09:05

## 2025-05-17 RX ADMIN — SENNOSIDES 8.6 MG: 8.6 TABLET, FILM COATED ORAL at 09:05

## 2025-05-17 RX ADMIN — IPRATROPIUM BROMIDE AND ALBUTEROL SULFATE 3 ML: .5; 3 SOLUTION RESPIRATORY (INHALATION) at 12:05

## 2025-05-17 RX ADMIN — TRAZODONE HYDROCHLORIDE 25 MG: 50 TABLET ORAL at 09:05

## 2025-05-17 RX ADMIN — DULOXETINE 60 MG: 30 CAPSULE, DELAYED RELEASE ORAL at 09:05

## 2025-05-17 RX ADMIN — NYSTATIN AND TRIAMCINOLONE ACETONIDE: 100000; 1 CREAM TOPICAL at 05:05

## 2025-05-17 RX ADMIN — IPRATROPIUM BROMIDE AND ALBUTEROL SULFATE 3 ML: .5; 3 SOLUTION RESPIRATORY (INHALATION) at 04:05

## 2025-05-17 RX ADMIN — IPRATROPIUM BROMIDE AND ALBUTEROL SULFATE 3 ML: .5; 3 SOLUTION RESPIRATORY (INHALATION) at 08:05

## 2025-05-17 NOTE — PROGRESS NOTES
[facility] Hospital Medicine Progress Note        Chief Complaint: Inpatient Follow-up for     HPI: Shyanne Cannon is a 74 y.o. female who has a medical history of bipolar disorder, anxiety, parkinsonism, memory loss, hypertension, hyperlipidemia, overactive bladder, chronic constipation, and COPD.  The patient presented to [facility] on 5/7/2025 with a primary complaint of fall and weakness.  Patient mentions that she has indoor 2 falls in last 4 days.  Yesterday, she had a fall as she was grabbing a picture of ice tea from the Fridge.  She fell to the ground and drop the entire patient advised to be on herself and the floor. After falling, she was too weak to stand back up to her feet so she laid there until her daughter came home.  She mentions that she lives alone and moves around with a rolling walker.  Daughter does daily wellness checks.  Patient denies any fever, chills, body aches, nausea, vomiting, loss his consciousness, head trauma, loss of bowel or bladder, or cough.     In the ED, vital signs were found to be normal.  CBC shows WBC count of 33 K. CMP is unremarkable.  CPK slightly elevated at 1443.  Troponin and lactic acid are WNL.  CT head shows no acute intracranial pathology.  CT abdomen pelvis shows no posttraumatic abnormality of the abdomen and pelvis.  CT spine shows no acute osseous abnormality, however ground-glass opacities are noted in the right lower lobe.  Blood cultures, urine cultures, sputum cultures pending.       Continue with vancomycin and cefepime, add DuoNebs p.r.n., Breo Ellipta.  SLP, PT/OT, and cm consulted.  Follow up cultures and narrow antibiotics as indicated.    Interval Hx:    Patient was seen and evaluated at bedside, oxygenating well on room air.  No complaints at this time. Placement pending. Continue to monitor.    Case was discussed with patient's nurse and  on the floor.    Objective/physical exam:  General: In no acute  distress, afebrile, tremor in RUE  Chest: Poor air movement  Heart: RRR, +S1, S2, no appreciable murmur  Abdomen: Soft, nontender, BS +  MSK: Warm, no lower extremity edema, no clubbing or cyanosis  Neurologic: ALert but confused oriented to self and place, Cranial nerve II-XII intact, Strength 5/5 in all 4 extremities    VITAL SIGNS: 24 HRS MIN & MAX LAST   Temp  Min: 97.8 °F (36.6 °C)  Max: 98.3 °F (36.8 °C) 98.2 °F (36.8 °C)   BP  Min: 102/57  Max: 121/67 113/61   Pulse  Min: 74  Max: 85  74   Resp  Min: 15  Max: 19 15   SpO2  Min: 92 %  Max: 97 % (!) 93 %     I have reviewed the following labs:  Recent Labs   Lab 05/14/25  0623 05/15/25  0515 05/16/25  0704   WBC 11.24 10.04 11.15   RBC 4.62 4.45 4.33   HGB 12.3 11.7* 11.5*   HCT 38.9 37.2 36.7*   MCV 84.2 83.6 84.8   MCH 26.6* 26.3* 26.6*   MCHC 31.6* 31.5* 31.3*   RDW 15.0 14.8 14.9    348 357   MPV 9.4 9.5 9.7     Recent Labs   Lab 05/11/25  1430 05/12/25  0718 05/14/25  0623 05/16/25  0704 05/17/25  0624   NA  --    < > 135* 137 132*   K  --    < > 3.8 4.2 4.1   CL  --    < > 91* 95* 95*   CO2  --    < > 36* 36* 29   BUN  --    < > 27.1* 17.7 17.2   CREATININE  --    < > 0.93 0.71 0.69   GLU  --    < > 124* 121* 136*   CALCIUM  --    < > 10.3* 9.4 8.9   PH 7.460*  --   --   --   --    MG  --   --   --  1.50* 1.90   ALBUMIN  --   --   --  3.0* 3.1*   PROT  --   --   --  6.3 6.3   ALKPHOS  --   --   --  58 64   ALT  --   --   --  12 12   AST  --   --   --  13 13   BILITOT  --   --   --  0.4 0.3    < > = values in this interval not displayed.     Microbiology Results (last 7 days)       Procedure Component Value Units Date/Time    Blood culture #2 **CANNOT BE ORDERED STAT** [[id number]]  (Normal) Collected: 05/07/25 2208    Order Status: Completed Specimen: Blood Updated: 05/13/25 0105     Blood Culture No Growth at 5 days    Blood culture #1 **CANNOT BE ORDERED STAT** [[id number]]  (Normal) Collected: 05/07/25 2208    Order Status: Completed Specimen:  Blood Updated: 05/13/25 0105     Blood Culture No Growth at 5 days             See below for Radiology    Assessment   Worsening encephalopathy, Cefepime-induced neurotoxicity?-resolved   Right lower lobe pneumonia; possibly due to aspiration pneumonia  Fall and weakness  Leukocytosis - resolved  Hypercalcemia, secondary to Elevated vitD stores and possibly dehydration     Other medical history includes bipolar disorder, anxiety, parkinsonism, memory loss, hypertension, hyperlipidemia, overactive bladder, chronic constipation, COPD     -CT completed head to toe which shows no acute abnormalities except for right lower lung pneumonia - ST recs soft diet with thin liquids  -started on vancomycin-stopped and cefepime; treat for 5-7 days  - Check VBG  - Bipap nightly  -follow up with blood, urine, sputum culture  -daily labs  -SLP, PT/OT, and cm consulted  -bowel regimen  -  CTH stable, ABG stable. will check bladder scan, UDS, tx for COPD exacerbation  - MRI brain - canceled as pt is now alert  - Neurology consult, appreciate recommendations  - Stopped cefepime due to concerns for neurotoxicity  - Completed 7-day course of abx on 5/14 for aspiration pneumonia  -urine calcium and PTH are wnl  -elevated vitamin-D level-but it is not in toxic range  -Likely hypercalcemia is due to elevated vitaminD levels and dehydration as Ca,PO4, and vitD levels were all elevated   -hypercalcemia is fluid responsive  -BMP from 5/17 look good.     Lab Holiday-repeat q48h and then prn     VTE Prophylaxis:  Lovenox  Patient condition:  Stable  Anticipated discharge and Disposition:  Pending SNF placement      All diagnosis and differential diagnosis have been reviewed; assessment and plan has been documented; I have personally reviewed the labs and test results that are presently available; I have reviewed the patients medication list; I have reviewed the consulting providers response and recommendations. I have reviewed or attempted to  review medical records based upon their availability    All of the patient's questions have been  addressed and answered. Patient's is agreeable to the above stated plan. I will continue to monitor closely and make adjustments to medical management as needed.    Portions of this note dictated using EMR integrated voice recognition software, and may be subject to voice recognition errors not corrected at proofreading. Please contact writer for clarification if needed.   _____________________________________________________________________    Malnutrition Status:  Nutrition consulted. Most recent weight and BMI monitored-     Measurements:  Wt Readings from Last 1 Encounters:   05/08/25 91.9 kg (202 lb 9.6 oz)   Body mass index is 34.24 kg/m².    Patient has been screened and assessed by RD.    Malnutrition Type:  Context:    Level: other (see comments) (Does not meet criteria)    Malnutrition Characteristic Summary:  Weight Loss (Malnutrition): other (see comments) (Does not meet criteria)  Energy Intake (Malnutrition): less than or equal to 50% for greater than or equal to 5 days  Fluid Accumulation (Malnutrition): other (see comments) (Not present)    Interventions/Recommendations (treatment strategy):        Scheduled Med:   albuterol-ipratropium  3 mL Nebulization Q4H    DULoxetine  60 mg Oral Daily    fluticasone furoate-vilanteroL  1 puff Inhalation Daily    furosemide (LASIX) injection  20 mg Intravenous Daily    nystatin-triamcinolone   Topical (Top) TID    polyethylene glycol  17 g Oral Daily    senna  8.6 mg Oral Daily    tetrahydrozoline 0.05%  1 drop Both Eyes BID    traZODone  25 mg Oral QHS      Continuous Infusions:     PRN Meds:    Current Facility-Administered Medications:     acetaminophen, 650 mg, Oral, Q4H PRN    albuterol-ipratropium, 3 mL, Nebulization, Q4H PRN    aluminum-magnesium hydroxide-simethicone, 30 mL, Oral, QID PRN    bisacodyL, 10 mg, Rectal, Daily PRN    dextrose 50%, 12.5 g,  Intravenous, PRN    dextrose 50%, 25 g, Intravenous, PRN    glucagon (human recombinant), 1 mg, Intramuscular, PRN    glucose, 16 g, Oral, PRN    glucose, 24 g, Oral, PRN    melatonin, 6 mg, Oral, Nightly PRN    sodium chloride 0.9%, 10 mL, Intravenous, PRN     Radiology:  I have personally reviewed the following imaging and agree with the radiologist.     CT Head Without Contrast  Narrative: CT head without contrast    INDICATION:  Mental status change of unknown cause para    TECHNIQUE:  Routine CT of the head was without contrast    Total DLP: 988 mGy.cm    Automatic exposure control was utilized to reduce the patient's dose    COMPARISON:  05/07/2025    FINDINGS:  No acute intra-cranial hemorrhage, midline shift, mass effect or extra-axial collection.    The ventricles are normal in size and configuration.  There are mild patchy areas of decreased attenuation in periventricular and deep white matter, while nonspecific, most likely sequela of chronic microvascular ischemia.    No sulcal effacement.  Normal grey-white matter differentiation.    Visualized osseous structures are unremarkable.  Visualized paranasal sinuses and mastoid air cells are clear.  Impression: No acute intracranial abnormality or significant interval changes    Electronically signed by: Jerzy Humphrey MD  Date:    05/11/2025  Time:    15:25  X-Ray Chest 1 View  Narrative: EXAMINATION:  XR CHEST 1 VIEW    CLINICAL HISTORY:  Sob;    TECHNIQUE:  One view    COMPARISON:  December 19, 2024..    FINDINGS:  Cardiopericardial silhouette is within normal limits.  There are bilateral lungs infiltrates which are more pronounced and more distributed on the right for which infectious cause is favored.  Please correlate clinically.  No significant fluid within the pleural spaces.  No pneumothorax.  Right axillary metallic clips.  Impression: Lungs infiltrates more distributed on the right.    Electronically signed by: Dank  Narinder  Date:    05/11/2025  Time:    13:21      Susie Londono MD  Department of Hospital Medicine   [facility]   05/17/2025

## 2025-05-18 PROCEDURE — 27000221 HC OXYGEN, UP TO 24 HOURS

## 2025-05-18 PROCEDURE — 99900031 HC PATIENT EDUCATION (STAT)

## 2025-05-18 PROCEDURE — 94660 CPAP INITIATION&MGMT: CPT

## 2025-05-18 PROCEDURE — 25000003 PHARM REV CODE 250

## 2025-05-18 PROCEDURE — 94760 N-INVAS EAR/PLS OXIMETRY 1: CPT

## 2025-05-18 PROCEDURE — 25000003 PHARM REV CODE 250: Performed by: STUDENT IN AN ORGANIZED HEALTH CARE EDUCATION/TRAINING PROGRAM

## 2025-05-18 PROCEDURE — 25000242 PHARM REV CODE 250 ALT 637 W/ HCPCS: Performed by: INTERNAL MEDICINE

## 2025-05-18 PROCEDURE — 11000001 HC ACUTE MED/SURG PRIVATE ROOM

## 2025-05-18 PROCEDURE — 21400001 HC TELEMETRY ROOM

## 2025-05-18 PROCEDURE — 94761 N-INVAS EAR/PLS OXIMETRY MLT: CPT

## 2025-05-18 PROCEDURE — 25000242 PHARM REV CODE 250 ALT 637 W/ HCPCS: Performed by: STUDENT IN AN ORGANIZED HEALTH CARE EDUCATION/TRAINING PROGRAM

## 2025-05-18 PROCEDURE — 94640 AIRWAY INHALATION TREATMENT: CPT

## 2025-05-18 PROCEDURE — 99900035 HC TECH TIME PER 15 MIN (STAT)

## 2025-05-18 PROCEDURE — 63600175 PHARM REV CODE 636 W HCPCS: Performed by: STUDENT IN AN ORGANIZED HEALTH CARE EDUCATION/TRAINING PROGRAM

## 2025-05-18 RX ADMIN — IPRATROPIUM BROMIDE AND ALBUTEROL SULFATE 3 ML: .5; 3 SOLUTION RESPIRATORY (INHALATION) at 08:05

## 2025-05-18 RX ADMIN — TRAZODONE HYDROCHLORIDE 25 MG: 50 TABLET ORAL at 09:05

## 2025-05-18 RX ADMIN — POLYETHYLENE GLYCOL 3350 17 G: 17 POWDER, FOR SOLUTION ORAL at 10:05

## 2025-05-18 RX ADMIN — IPRATROPIUM BROMIDE AND ALBUTEROL SULFATE 3 ML: .5; 3 SOLUTION RESPIRATORY (INHALATION) at 11:05

## 2025-05-18 RX ADMIN — Medication 1 DROP: at 09:05

## 2025-05-18 RX ADMIN — IPRATROPIUM BROMIDE AND ALBUTEROL SULFATE 3 ML: .5; 3 SOLUTION RESPIRATORY (INHALATION) at 04:05

## 2025-05-18 RX ADMIN — Medication 1 DROP: at 10:05

## 2025-05-18 RX ADMIN — IPRATROPIUM BROMIDE AND ALBUTEROL SULFATE 3 ML: .5; 3 SOLUTION RESPIRATORY (INHALATION) at 12:05

## 2025-05-18 RX ADMIN — NYSTATIN AND TRIAMCINOLONE ACETONIDE: 100000; 1 CREAM TOPICAL at 10:05

## 2025-05-18 RX ADMIN — FLUTICASONE FUROATE AND VILANTEROL TRIFENATATE 1 PUFF: 100; 25 POWDER RESPIRATORY (INHALATION) at 10:05

## 2025-05-18 RX ADMIN — DULOXETINE 60 MG: 30 CAPSULE, DELAYED RELEASE ORAL at 10:05

## 2025-05-18 RX ADMIN — FUROSEMIDE 20 MG: 10 INJECTION, SOLUTION INTRAMUSCULAR; INTRAVENOUS at 10:05

## 2025-05-18 RX ADMIN — SENNOSIDES 8.6 MG: 8.6 TABLET, FILM COATED ORAL at 10:05

## 2025-05-18 RX ADMIN — NYSTATIN AND TRIAMCINOLONE ACETONIDE: 100000; 1 CREAM TOPICAL at 09:05

## 2025-05-18 NOTE — PROGRESS NOTES
[facility] Hospital Medicine Progress Note        Chief Complaint: Inpatient Follow-up for     HPI: Shyanne Cannon is a 74 y.o. female who has a medical history of bipolar disorder, anxiety, parkinsonism, memory loss, hypertension, hyperlipidemia, overactive bladder, chronic constipation, and COPD.  The patient presented to [facility] on 5/7/2025 with a primary complaint of fall and weakness.  Patient mentions that she has indoor 2 falls in last 4 days.  Yesterday, she had a fall as she was grabbing a picture of ice tea from the Fridge.  She fell to the ground and drop the entire patient advised to be on herself and the floor. After falling, she was too weak to stand back up to her feet so she laid there until her daughter came home.  She mentions that she lives alone and moves around with a rolling walker.  Daughter does daily wellness checks.  Patient denies any fever, chills, body aches, nausea, vomiting, loss his consciousness, head trauma, loss of bowel or bladder, or cough.     In the ED, vital signs were found to be normal.  CBC shows WBC count of 33 K. CMP is unremarkable.  CPK slightly elevated at 1443.  Troponin and lactic acid are WNL.  CT head shows no acute intracranial pathology.  CT abdomen pelvis shows no posttraumatic abnormality of the abdomen and pelvis.  CT spine shows no acute osseous abnormality, however ground-glass opacities are noted in the right lower lobe.  Blood cultures, urine cultures, sputum cultures pending.       Continue with vancomycin and cefepime, add DuoNebs p.r.n., Breo Ellipta.  SLP, PT/OT, and cm consulted.  Follow up cultures and narrow antibiotics as indicated.    Interval Hx:    Patient was seen and evaluated at bedside, oxygenating well on NC.  No complaints at this time. Placement pending. Continue to monitor.    Case was discussed with patient's nurse and  on the floor.    Objective/physical exam:  General: In no acute  distress, afebrile, tremor in RUE  Chest: Poor air movement  Heart: RRR, +S1, S2, no appreciable murmur  Abdomen: Soft, nontender, BS +  MSK: Warm, no lower extremity edema, no clubbing or cyanosis  Neurologic: ALert but confused oriented to self and place, Cranial nerve II-XII intact, Strength 5/5 in all 4 extremities    VITAL SIGNS: 24 HRS MIN & MAX LAST   Temp  Min: 98 °F (36.7 °C)  Max: 98.5 °F (36.9 °C) 98.5 °F (36.9 °C)   BP  Min: 98/59  Max: 118/68 105/73   Pulse  Min: 79  Max: 91  86   Resp  Min: 14  Max: 20 14   SpO2  Min: 91 %  Max: 97 % 95 %     I have reviewed the following labs:  Recent Labs   Lab 05/14/25  0623 05/15/25  0515 05/16/25  0704   WBC 11.24 10.04 11.15   RBC 4.62 4.45 4.33   HGB 12.3 11.7* 11.5*   HCT 38.9 37.2 36.7*   MCV 84.2 83.6 84.8   MCH 26.6* 26.3* 26.6*   MCHC 31.6* 31.5* 31.3*   RDW 15.0 14.8 14.9    348 357   MPV 9.4 9.5 9.7     Recent Labs   Lab 05/11/25  1430 05/12/25  0718 05/14/25  0623 05/16/25  0704 05/17/25  0624   NA  --    < > 135* 137 132*   K  --    < > 3.8 4.2 4.1   CL  --    < > 91* 95* 95*   CO2  --    < > 36* 36* 29   BUN  --    < > 27.1* 17.7 17.2   CREATININE  --    < > 0.93 0.71 0.69   GLU  --    < > 124* 121* 136*   CALCIUM  --    < > 10.3* 9.4 8.9   PH 7.460*  --   --   --   --    MG  --   --   --  1.50* 1.90   ALBUMIN  --   --   --  3.0* 3.1*   PROT  --   --   --  6.3 6.3   ALKPHOS  --   --   --  58 64   ALT  --   --   --  12 12   AST  --   --   --  13 13   BILITOT  --   --   --  0.4 0.3    < > = values in this interval not displayed.     Microbiology Results (last 7 days)       Procedure Component Value Units Date/Time    Blood culture #2 **CANNOT BE ORDERED STAT** [[id number]]  (Normal) Collected: 05/07/25 2208    Order Status: Completed Specimen: Blood Updated: 05/13/25 0105     Blood Culture No Growth at 5 days    Blood culture #1 **CANNOT BE ORDERED STAT** [[id number]]  (Normal) Collected: 05/07/25 2208    Order Status: Completed Specimen: Blood  Updated: 05/13/25 0105     Blood Culture No Growth at 5 days             See below for Radiology    Assessment   Worsening encephalopathy, Cefepime-induced neurotoxicity?-resolved   Right lower lobe pneumonia; possibly due to aspiration pneumonia  Fall and weakness  Leukocytosis - resolved  Hypercalcemia, secondary to Elevated vitD stores and possibly dehydration     Other medical history includes bipolar disorder, anxiety, parkinsonism, memory loss, hypertension, hyperlipidemia, overactive bladder, chronic constipation, COPD     -CT completed head to toe which shows no acute abnormalities except for right lower lung pneumonia - ST recs soft diet with thin liquids  -started on vancomycin-stopped and cefepime; treat for 5-7 days  -SLP, PT/OT, and cm consulted  -bowel regimen  -  CTH stable, ABG stable. will check bladder scan, UDS, tx for COPD exacerbation  - MRI brain - canceled as pt is now alert  - Neurology consult, appreciate recommendations  - Stopped cefepime due to concerns for neurotoxicity  - Completed 7-day course of abx on 5/14 for aspiration pneumonia  -urine calcium and PTH are wnl  -elevated vitamin-D level-but it is not in toxic range  -Likely hypercalcemia is due to elevated vitaminD levels and dehydration as Ca,PO4, and vitD levels were all elevated   -hypercalcemia is fluid responsive  -BMP from 5/17 look good.     Labs tomorrow,  and then prn     VTE Prophylaxis:  Lovenox  Patient condition:  Stable  Anticipated discharge and Disposition:  Pending SNF placement      All diagnosis and differential diagnosis have been reviewed; assessment and plan has been documented; I have personally reviewed the labs and test results that are presently available; I have reviewed the patients medication list; I have reviewed the consulting providers response and recommendations. I have reviewed or attempted to review medical records based upon their availability    All of the patient's questions have been   addressed and answered. Patient's is agreeable to the above stated plan. I will continue to monitor closely and make adjustments to medical management as needed.    Portions of this note dictated using EMR integrated voice recognition software, and may be subject to voice recognition errors not corrected at proofreading. Please contact writer for clarification if needed.   _____________________________________________________________________    Malnutrition Status:  Nutrition consulted. Most recent weight and BMI monitored-     Measurements:  Wt Readings from Last 1 Encounters:   05/08/25 91.9 kg (202 lb 9.6 oz)   Body mass index is 34.24 kg/m².    Patient has been screened and assessed by RD.    Malnutrition Type:  Context:    Level: other (see comments) (Does not meet criteria)    Malnutrition Characteristic Summary:  Weight Loss (Malnutrition): other (see comments) (Does not meet criteria)  Energy Intake (Malnutrition): less than or equal to 50% for greater than or equal to 5 days  Fluid Accumulation (Malnutrition): other (see comments) (Not present)    Interventions/Recommendations (treatment strategy):        Scheduled Med:   albuterol-ipratropium  3 mL Nebulization Q4H    DULoxetine  60 mg Oral Daily    fluticasone furoate-vilanteroL  1 puff Inhalation Daily    furosemide (LASIX) injection  20 mg Intravenous Daily    nystatin-triamcinolone   Topical (Top) TID    polyethylene glycol  17 g Oral Daily    senna  8.6 mg Oral Daily    tetrahydrozoline 0.05%  1 drop Both Eyes BID    traZODone  25 mg Oral QHS      Continuous Infusions:     PRN Meds:    Current Facility-Administered Medications:     acetaminophen, 650 mg, Oral, Q4H PRN    albuterol-ipratropium, 3 mL, Nebulization, Q4H PRN    aluminum-magnesium hydroxide-simethicone, 30 mL, Oral, QID PRN    bisacodyL, 10 mg, Rectal, Daily PRN    dextrose 50%, 12.5 g, Intravenous, PRN    dextrose 50%, 25 g, Intravenous, PRN    glucagon (human recombinant), 1 mg,  Intramuscular, PRN    glucose, 16 g, Oral, PRN    glucose, 24 g, Oral, PRN    melatonin, 6 mg, Oral, Nightly PRN    sodium chloride 0.9%, 10 mL, Intravenous, PRN     Radiology:  I have personally reviewed the following imaging and agree with the radiologist.     CT Head Without Contrast  Narrative: CT head without contrast    INDICATION:  Mental status change of unknown cause para    TECHNIQUE:  Routine CT of the head was without contrast    Total DLP: 988 mGy.cm    Automatic exposure control was utilized to reduce the patient's dose    COMPARISON:  05/07/2025    FINDINGS:  No acute intra-cranial hemorrhage, midline shift, mass effect or extra-axial collection.    The ventricles are normal in size and configuration.  There are mild patchy areas of decreased attenuation in periventricular and deep white matter, while nonspecific, most likely sequela of chronic microvascular ischemia.    No sulcal effacement.  Normal grey-white matter differentiation.    Visualized osseous structures are unremarkable.  Visualized paranasal sinuses and mastoid air cells are clear.  Impression: No acute intracranial abnormality or significant interval changes    Electronically signed by: Jerzy Humphrey MD  Date:    05/11/2025  Time:    15:25  X-Ray Chest 1 View  Narrative: EXAMINATION:  XR CHEST 1 VIEW    CLINICAL HISTORY:  Sob;    TECHNIQUE:  One view    COMPARISON:  December 19, 2024..    FINDINGS:  Cardiopericardial silhouette is within normal limits.  There are bilateral lungs infiltrates which are more pronounced and more distributed on the right for which infectious cause is favored.  Please correlate clinically.  No significant fluid within the pleural spaces.  No pneumothorax.  Right axillary metallic clips.  Impression: Lungs infiltrates more distributed on the right.    Electronically signed by: Dank Barcenas  Date:    05/11/2025  Time:    13:21      Susie Londono MD  Department of Hospital Medicine   [facility]   05/18/2025

## 2025-05-19 VITALS
DIASTOLIC BLOOD PRESSURE: 70 MMHG | OXYGEN SATURATION: 97 % | HEIGHT: 65 IN | HEART RATE: 86 BPM | TEMPERATURE: 99 F | SYSTOLIC BLOOD PRESSURE: 129 MMHG | WEIGHT: 202.63 LBS | RESPIRATION RATE: 18 BRPM | BODY MASS INDEX: 33.76 KG/M2

## 2025-05-19 PROBLEM — J69.0 ASPIRATION PNEUMONIA: Status: ACTIVE | Noted: 2025-05-19

## 2025-05-19 LAB
ANION GAP SERPL CALC-SCNC: 11 MEQ/L
BASOPHILS # BLD AUTO: 0.06 X10(3)/MCL
BASOPHILS NFR BLD AUTO: 0.6 %
BUN SERPL-MCNC: 15.5 MG/DL (ref 9.8–20.1)
CALCIUM SERPL-MCNC: 9.5 MG/DL (ref 8.4–10.2)
CHLORIDE SERPL-SCNC: 93 MMOL/L (ref 98–107)
CO2 SERPL-SCNC: 30 MMOL/L (ref 23–31)
CREAT SERPL-MCNC: 0.73 MG/DL (ref 0.55–1.02)
CREAT/UREA NIT SERPL: 21
EOSINOPHIL # BLD AUTO: 0.27 X10(3)/MCL (ref 0–0.9)
EOSINOPHIL NFR BLD AUTO: 2.6 %
ERYTHROCYTE [DISTWIDTH] IN BLOOD BY AUTOMATED COUNT: 15.2 % (ref 11.5–17)
GFR SERPLBLD CREATININE-BSD FMLA CKD-EPI: >60 ML/MIN/1.73/M2
GLUCOSE SERPL-MCNC: 152 MG/DL (ref 82–115)
HCT VFR BLD AUTO: 35.1 % (ref 37–47)
HGB BLD-MCNC: 11 G/DL (ref 12–16)
IMM GRANULOCYTES # BLD AUTO: 0.05 X10(3)/MCL (ref 0–0.04)
IMM GRANULOCYTES NFR BLD AUTO: 0.5 %
LYMPHOCYTES # BLD AUTO: 1.44 X10(3)/MCL (ref 0.6–4.6)
LYMPHOCYTES NFR BLD AUTO: 13.8 %
MAGNESIUM SERPL-MCNC: 1.4 MG/DL (ref 1.6–2.6)
MCH RBC QN AUTO: 26.4 PG (ref 27–31)
MCHC RBC AUTO-ENTMCNC: 31.3 G/DL (ref 33–36)
MCV RBC AUTO: 84.2 FL (ref 80–94)
MONOCYTES # BLD AUTO: 0.71 X10(3)/MCL (ref 0.1–1.3)
MONOCYTES NFR BLD AUTO: 6.8 %
NEUTROPHILS # BLD AUTO: 7.92 X10(3)/MCL (ref 2.1–9.2)
NEUTROPHILS NFR BLD AUTO: 75.7 %
NRBC BLD AUTO-RTO: 0 %
PHOSPHATE SERPL-MCNC: 4 MG/DL (ref 2.3–4.7)
PLATELET # BLD AUTO: 396 X10(3)/MCL (ref 130–400)
PMV BLD AUTO: 9.8 FL (ref 7.4–10.4)
POTASSIUM SERPL-SCNC: 4.3 MMOL/L (ref 3.5–5.1)
RBC # BLD AUTO: 4.17 X10(6)/MCL (ref 4.2–5.4)
SODIUM SERPL-SCNC: 134 MMOL/L (ref 136–145)
WBC # BLD AUTO: 10.45 X10(3)/MCL (ref 4.5–11.5)

## 2025-05-19 PROCEDURE — 85025 COMPLETE CBC W/AUTO DIFF WBC: CPT | Performed by: STUDENT IN AN ORGANIZED HEALTH CARE EDUCATION/TRAINING PROGRAM

## 2025-05-19 PROCEDURE — 84100 ASSAY OF PHOSPHORUS: CPT | Performed by: STUDENT IN AN ORGANIZED HEALTH CARE EDUCATION/TRAINING PROGRAM

## 2025-05-19 PROCEDURE — 99900035 HC TECH TIME PER 15 MIN (STAT)

## 2025-05-19 PROCEDURE — 63600175 PHARM REV CODE 636 W HCPCS: Performed by: STUDENT IN AN ORGANIZED HEALTH CARE EDUCATION/TRAINING PROGRAM

## 2025-05-19 PROCEDURE — 80048 BASIC METABOLIC PNL TOTAL CA: CPT | Performed by: STUDENT IN AN ORGANIZED HEALTH CARE EDUCATION/TRAINING PROGRAM

## 2025-05-19 PROCEDURE — 99900031 HC PATIENT EDUCATION (STAT)

## 2025-05-19 PROCEDURE — 94640 AIRWAY INHALATION TREATMENT: CPT

## 2025-05-19 PROCEDURE — 25000003 PHARM REV CODE 250

## 2025-05-19 PROCEDURE — 27000221 HC OXYGEN, UP TO 24 HOURS

## 2025-05-19 PROCEDURE — 25000242 PHARM REV CODE 250 ALT 637 W/ HCPCS: Performed by: INTERNAL MEDICINE

## 2025-05-19 PROCEDURE — 36415 COLL VENOUS BLD VENIPUNCTURE: CPT | Performed by: STUDENT IN AN ORGANIZED HEALTH CARE EDUCATION/TRAINING PROGRAM

## 2025-05-19 PROCEDURE — 83735 ASSAY OF MAGNESIUM: CPT | Performed by: STUDENT IN AN ORGANIZED HEALTH CARE EDUCATION/TRAINING PROGRAM

## 2025-05-19 PROCEDURE — 25000003 PHARM REV CODE 250: Performed by: STUDENT IN AN ORGANIZED HEALTH CARE EDUCATION/TRAINING PROGRAM

## 2025-05-19 PROCEDURE — 25000242 PHARM REV CODE 250 ALT 637 W/ HCPCS: Performed by: STUDENT IN AN ORGANIZED HEALTH CARE EDUCATION/TRAINING PROGRAM

## 2025-05-19 PROCEDURE — 94760 N-INVAS EAR/PLS OXIMETRY 1: CPT

## 2025-05-19 RX ADMIN — SENNOSIDES 8.6 MG: 8.6 TABLET, FILM COATED ORAL at 09:05

## 2025-05-19 RX ADMIN — Medication 1 DROP: at 09:05

## 2025-05-19 RX ADMIN — DULOXETINE 60 MG: 30 CAPSULE, DELAYED RELEASE ORAL at 09:05

## 2025-05-19 RX ADMIN — IPRATROPIUM BROMIDE AND ALBUTEROL SULFATE 3 ML: .5; 3 SOLUTION RESPIRATORY (INHALATION) at 07:05

## 2025-05-19 RX ADMIN — FLUTICASONE FUROATE AND VILANTEROL TRIFENATATE 1 PUFF: 100; 25 POWDER RESPIRATORY (INHALATION) at 09:05

## 2025-05-19 RX ADMIN — NYSTATIN AND TRIAMCINOLONE ACETONIDE: 100000; 1 CREAM TOPICAL at 09:05

## 2025-05-19 RX ADMIN — IPRATROPIUM BROMIDE AND ALBUTEROL SULFATE 3 ML: .5; 3 SOLUTION RESPIRATORY (INHALATION) at 11:05

## 2025-05-19 RX ADMIN — POLYETHYLENE GLYCOL 3350 17 G: 17 POWDER, FOR SOLUTION ORAL at 09:05

## 2025-05-19 RX ADMIN — FUROSEMIDE 20 MG: 10 INJECTION, SOLUTION INTRAMUSCULAR; INTRAVENOUS at 08:05

## 2025-05-19 NOTE — PLAN OF CARE
Reached out to Nadine at [facility] to see if there is an update in her auth waiting on response

## 2025-05-19 NOTE — DISCHARGE SUMMARY
[facility] Hospital Medicine Discharge Summary    Admit Date: 5/7/2025  Discharge Date and Time: 5/19/202510:19 AM  Admitting Physician:  Team  Discharging Physician: Susie Londono MD.  Primary Care Physician: Juanita Willingham FNP  Consults: Psychiatry, Neurology    Discharge Diagnoses:  Worsening encephalopathy, Cefepime-induced neurotoxicity?-resolved   Right lower lobe pneumonia; possibly due to aspiration pneumonia  Fall and weakness  Leukocytosis - resolved  Hypercalcemia, secondary to Elevated vitD stores and possibly dehydration     Other medical history includes bipolar disorder, anxiety, parkinsonism, memory loss, hypertension, hyperlipidemia, overactive bladder, chronic constipation, COPD    Hospital Course:    Shyanne Cannon is a 74 y.o. female who has a medical history of bipolar disorder, anxiety, parkinsonism, memory loss, hypertension, hyperlipidemia, overactive bladder, chronic constipation, and COPD.  The patient presented to [facility] on 5/7/2025 with a primary complaint of fall and weakness.  Patient mentions that she has indoor 2 falls in last 4 days.  Yesterday, she had a fall as she was grabbing a picture of ice tea from the Fridge.  She fell to the ground and drop the entire patient advised to be on herself and the floor. After falling, she was too weak to stand back up to her feet so she laid there until her daughter came home.  She mentions that she lives alone and moves around with a rolling walker.  Daughter does daily wellness checks.  Patient denies any fever, chills, body aches, nausea, vomiting, loss his consciousness, head trauma, loss of bowel or bladder, or cough.     In the ED, vital signs were found to be normal.  CBC shows WBC count of 33 K. CMP is unremarkable.  CPK slightly elevated at 1443.  Troponin and lactic acid are WNL.  CT head shows no acute intracranial pathology.  CT abdomen pelvis shows no posttraumatic abnormality of the abdomen and  pelvis.  CT spine shows no acute osseous abnormality, however ground-glass opacities are noted in the right lower lobe.  Blood cultures, urine cultures, sputum cultures pending.       Continue with vancomycin and cefepime, add Rylie p.r.n., Breo Ellipta.  SLP, PT/OT, and cm consulted.  Follow up cultures and narrow antibiotics as indicated.    Throughout the duration of the patient's stay, patient's mentation worsened before it started to improve.  There was some probable cause to believe that it was cefepime induced neurotoxicity.  Patient was given antibiotics for 7 days to treat for aspiration pneumonia.  Likely patients altered mental status was due to patient's psych history and possible hospital-acquired delirium.  All cultures were found to be negative.  Mentation significantly improve as days went on.  Patient was evaluated by PT and lb to require moderate intense therapy.  Patient was voiding placement authorization until now.  Over the weekend, patient was noted to have hypercalcemia lab work.  On for the 30s it was found that patient's vitamin-D level is higher than normal.  As per admission med rec it seems that the patient takes vitamin-D daily to which we will discontinue at discharge.  Hypercalcemia was responsive to fluids and Lasix 1 time.    Patient has been approved for SNF.  Patient has verbalized her understanding of this hospital stay and is safe for discharge at this time.  Follow up with PCP after completion of SNF placement.      Pt was seen and examined on the day of discharge  Vitals:  VITAL SIGNS: 24 HRS MIN & MAX LAST   Temp  Min: 98 °F (36.7 °C)  Max: 99 °F (37.2 °C) 98.5 °F (36.9 °C)   BP  Min: 102/64  Max: 129/66 102/64   Pulse  Min: 77  Max: 97  77   Resp  Min: 12  Max: 19 19   SpO2  Min: 93 %  Max: 95 % (!) 93 %       Physical Exam:  General: In no acute distress, afebrile, tremor in RUE  Chest: Poor air movement  Heart: RRR, +S1, S2, no appreciable murmur  Abdomen: Soft,  nontender, BS +  MSK: Warm, no lower extremity edema, no clubbing or cyanosis  Neurologic: ALert but confused oriented to self and place, Cranial nerve II-XII intact, Strength 5/5 in all 4 extremities    Procedures Performed: No admission procedures for hospital encounter.     Significant Diagnostic Studies: See Full reports for all details    Recent Labs   Lab 05/15/25  0515 05/16/25  0704 05/19/25  0522   WBC 10.04 11.15 10.45   RBC 4.45 4.33 4.17*   HGB 11.7* 11.5* 11.0*   HCT 37.2 36.7* 35.1*   MCV 83.6 84.8 84.2   MCH 26.3* 26.6* 26.4*   MCHC 31.5* 31.3* 31.3*   RDW 14.8 14.9 15.2    357 396   MPV 9.5 9.7 9.8       Recent Labs   Lab 05/16/25  0704 05/17/25  0624 05/19/25  0522    132* 134*   K 4.2 4.1 4.3   CL 95* 95* 93*   CO2 36* 29 30   BUN 17.7 17.2 15.5   CREATININE 0.71 0.69 0.73   * 136* 152*   CALCIUM 9.4 8.9 9.5   MG 1.50* 1.90 1.40*   ALBUMIN 3.0* 3.1*  --    PROT 6.3 6.3  --    ALKPHOS 58 64  --    ALT 12 12  --    AST 13 13  --    BILITOT 0.4 0.3  --         Microbiology Results (last 7 days)       Procedure Component Value Units Date/Time    Blood culture #2 **CANNOT BE ORDERED STAT** [[id number]]  (Normal) Collected: 05/07/25 2208    Order Status: Completed Specimen: Blood Updated: 05/13/25 0105     Blood Culture No Growth at 5 days    Blood culture #1 **CANNOT BE ORDERED STAT** [[id number]]  (Normal) Collected: 05/07/25 2208    Order Status: Completed Specimen: Blood Updated: 05/13/25 0105     Blood Culture No Growth at 5 days             CT Head Without Contrast  Narrative: CT head without contrast    INDICATION:  Mental status change of unknown cause para    TECHNIQUE:  Routine CT of the head was without contrast    Total DLP: 988 mGy.cm    Automatic exposure control was utilized to reduce the patient's dose    COMPARISON:  05/07/2025    FINDINGS:  No acute intra-cranial hemorrhage, midline shift, mass effect or extra-axial collection.    The ventricles are normal in size  and configuration.  There are mild patchy areas of decreased attenuation in periventricular and deep white matter, while nonspecific, most likely sequela of chronic microvascular ischemia.    No sulcal effacement.  Normal grey-white matter differentiation.    Visualized osseous structures are unremarkable.  Visualized paranasal sinuses and mastoid air cells are clear.  Impression: No acute intracranial abnormality or significant interval changes    Electronically signed by: Jerzy Humphrey MD  Date:    05/11/2025  Time:    15:25  X-Ray Chest 1 View  Narrative: EXAMINATION:  XR CHEST 1 VIEW    CLINICAL HISTORY:  Sob;    TECHNIQUE:  One view    COMPARISON:  December 19, 2024..    FINDINGS:  Cardiopericardial silhouette is within normal limits.  There are bilateral lungs infiltrates which are more pronounced and more distributed on the right for which infectious cause is favored.  Please correlate clinically.  No significant fluid within the pleural spaces.  No pneumothorax.  Right axillary metallic clips.  Impression: Lungs infiltrates more distributed on the right.    Electronically signed by: Dank Barcenas  Date:    05/11/2025  Time:    13:21         Medication List        CONTINUE taking these medications      ALPRAZolam 0.5 MG tablet  Commonly known as: XANAX     atorvastatin 40 MG tablet  Commonly known as: LIPITOR     benztropine 1 MG tablet  Commonly known as: COGENTIN     donepeziL 10 MG tablet  Commonly known as: ARICEPT     doxepin 50 MG capsule  Commonly known as: SINEQUAN     DULoxetine 60 MG capsule  Commonly known as: CYMBALTA     ferrous sulfate 325 (65 FE) MG EC tablet     ibuprofen 800 MG tablet  Commonly known as: ADVIL,MOTRIN     LACTULOSE ORAL     loratadine 10 mg tablet  Commonly known as: CLARITIN     magnesium oxide 400 mg (241.3 mg magnesium) tablet  Commonly known as: MAG-OX     metFORMIN 500 MG tablet  Commonly known as: GLUCOPHAGE     MOUNJARO 5 mg/0.5 mL Pnij  Generic drug: tirzepatide     *  nystatin ointment  Commonly known as: MYCOSTATIN     * nystatin powder  Commonly known as: MYCOSTATIN  Apply topically 4 (four) times daily.     OLANZapine 20 MG tablet  Commonly known as: ZyPREXA     omeprazole 40 MG capsule  Commonly known as: PRILOSEC     tiotropium 18 mcg inhalation capsule  Commonly known as: SPIRIVA     trospium 20 mg Tab tablet  Commonly known as: sanctura     * WIXELA INHUB 250-50 mcg/dose diskus inhaler  Generic drug: fluticasone-salmeterol 250-50 mcg/dose     * fluticasone-salmeterol 100-50 mcg/dose 100-50 mcg/dose diskus inhaler  Commonly known as: ADVAIR           * This list has 4 medication(s) that are the same as other medications prescribed for you. Read the directions carefully, and ask your doctor or other care provider to review them with you.                STOP taking these medications      cholecalciferol (vitamin D3) 1,250 mcg (50,000 unit) capsule     gabapentin 800 MG tablet  Commonly known as: NEURONTIN               Explained in detail to the patient about the discharge plan, medications, and follow-up visits. Pt understands and agrees with the treatment plan  Discharge Disposition: Home or Self Care   Discharged Condition: stable  Diet-   Dietary Orders (From admission, onward)       Start     Ordered    05/15/25 0823  Dietary nutrition supplements BID; Boost Plus Nutritional Drink - Rich Chocolate  Continuous        Question Answer Comment   Frequency: BID    Select PO Supplement: Boost Plus Nutritional Drink - Rich Chocolate        05/15/25 0822    05/13/25 1520  Diet Soft & Bite Sized (IDDSI Level 6)  Diet effective now         05/13/25 1522                   Medications Per DC med rec  Activities as tolerated   Follow-up Information       Juanita Willingham FNP Follow up.    Specialty: Family Medicine  Contact information:  [address] [id number] [phone]                           For further questions contact hospitalist  office    Discharge time 33 minutes    For worsening symptoms, chest pain, shortness of breath, increased abdominal pain, high grade fever, stroke or stroke like symptoms, immediately go to the nearest Emergency Room or call 911 as soon as possible.      Susie Austin M.D, on 5/19/2025. at 10:19 AM.

## 2025-05-19 NOTE — PLAN OF CARE
05/19/25 1040   Final Note   Assessment Type Final Discharge Note   Anticipated Discharge Disposition SNF  (new SNF TCU)   Post-Acute Status   Post-Acute Authorization Placement   Post-Acute Placement Status Set-up Complete/Auth obtained   Coverage [facility]   Discharge Delays None known at this time

## 2025-05-19 NOTE — NURSING
Report given to TERRIE Melendez at TCU at 1245. Call back number is [phone]. Patient will be transported via [facility] transport van. Telemetry and IV discontinued per MD order. Patient safe.